# Patient Record
Sex: FEMALE | Race: AMERICAN INDIAN OR ALASKA NATIVE | URBAN - METROPOLITAN AREA
[De-identification: names, ages, dates, MRNs, and addresses within clinical notes are randomized per-mention and may not be internally consistent; named-entity substitution may affect disease eponyms.]

---

## 2021-03-13 ENCOUNTER — INPATIENT (INPATIENT)
Facility: HOSPITAL | Age: 71
LOS: 5 days | Discharge: NURSING, REHAB OR LONG-TERM CARE | End: 2021-03-19
Attending: STUDENT IN AN ORGANIZED HEALTH CARE EDUCATION/TRAINING PROGRAM | Admitting: STUDENT IN AN ORGANIZED HEALTH CARE EDUCATION/TRAINING PROGRAM
Payer: COMMERCIAL

## 2021-03-13 VITALS
RESPIRATION RATE: 18 BRPM | HEART RATE: 95 BPM | DIASTOLIC BLOOD PRESSURE: 104 MMHG | TEMPERATURE: 98 F | SYSTOLIC BLOOD PRESSURE: 180 MMHG | OXYGEN SATURATION: 100 %

## 2021-03-13 LAB
APPEARANCE UR: CLEAR — SIGNIFICANT CHANGE UP
BILIRUB UR-MCNC: NEGATIVE — SIGNIFICANT CHANGE UP
BLOOD GAS VENOUS COMPREHENSIVE RESULT: SIGNIFICANT CHANGE UP
CHLORIDE SERPL-SCNC: 99 MMOL/L — SIGNIFICANT CHANGE UP (ref 98–107)
COLOR SPEC: SIGNIFICANT CHANGE UP
DIFF PNL FLD: ABNORMAL
GLUCOSE UR QL: ABNORMAL
HCT VFR BLD CALC: 37.8 % — SIGNIFICANT CHANGE UP (ref 34.5–45)
HGB BLD-MCNC: 12.2 G/DL — SIGNIFICANT CHANGE UP (ref 11.5–15.5)
KETONES UR-MCNC: NEGATIVE — SIGNIFICANT CHANGE UP
LEUKOCYTE ESTERASE UR-ACNC: NEGATIVE — SIGNIFICANT CHANGE UP
MCHC RBC-ENTMCNC: 26.8 PG — LOW (ref 27–34)
MCHC RBC-ENTMCNC: 32.3 GM/DL — SIGNIFICANT CHANGE UP (ref 32–36)
MCV RBC AUTO: 82.9 FL — SIGNIFICANT CHANGE UP (ref 80–100)
NITRITE UR-MCNC: NEGATIVE — SIGNIFICANT CHANGE UP
NRBC # BLD: 0 /100 WBCS — SIGNIFICANT CHANGE UP
NRBC # FLD: 0 K/UL — SIGNIFICANT CHANGE UP
PH UR: 6.5 — SIGNIFICANT CHANGE UP (ref 5–8)
PLATELET # BLD AUTO: 461 K/UL — HIGH (ref 150–400)
POTASSIUM SERPL-MCNC: 4.5 MMOL/L — SIGNIFICANT CHANGE UP (ref 3.5–5.3)
POTASSIUM SERPL-SCNC: 4.5 MMOL/L — SIGNIFICANT CHANGE UP (ref 3.5–5.3)
PROT UR-MCNC: ABNORMAL
RBC # BLD: 4.56 M/UL — SIGNIFICANT CHANGE UP (ref 3.8–5.2)
RBC # FLD: 12.5 % — SIGNIFICANT CHANGE UP (ref 10.3–14.5)
SODIUM SERPL-SCNC: 134 MMOL/L — LOW (ref 135–145)
SP GR SPEC: 1.02 — SIGNIFICANT CHANGE UP (ref 1.01–1.02)
TROPONIN T, HIGH SENSITIVITY RESULT: 44 NG/L — SIGNIFICANT CHANGE UP
TSH SERPL-MCNC: 2.54 UIU/ML — SIGNIFICANT CHANGE UP (ref 0.27–4.2)
UROBILINOGEN FLD QL: SIGNIFICANT CHANGE UP
WBC # BLD: 13.59 K/UL — HIGH (ref 3.8–10.5)
WBC # FLD AUTO: 13.59 K/UL — HIGH (ref 3.8–10.5)

## 2021-03-13 PROCEDURE — 93010 ELECTROCARDIOGRAM REPORT: CPT | Mod: NC

## 2021-03-13 PROCEDURE — 71045 X-RAY EXAM CHEST 1 VIEW: CPT | Mod: 26

## 2021-03-13 PROCEDURE — 70450 CT HEAD/BRAIN W/O DYE: CPT | Mod: 26

## 2021-03-13 PROCEDURE — 99285 EMERGENCY DEPT VISIT HI MDM: CPT | Mod: 25

## 2021-03-13 NOTE — ED ADULT TRIAGE NOTE - CHIEF COMPLAINT QUOTE
weakness, dizziness since this AM with fall onto hands when getting out of chair, no pain at present -- HX diabetes, 3 cardiac stents - no chest pain, no SOB -  in triage

## 2021-03-13 NOTE — ED ADULT NURSE NOTE - NSIMPLEMENTINTERV_GEN_ALL_ED
Implemented All Universal Safety Interventions:  [place] to call system. Call bell, personal items and telephone within reach. Instruct patient to call for assistance. Room bathroom lighting operational. Non-slip footwear when patient is off stretcher. Physically safe environment: no spills, clutter or unnecessary equipment. Stretcher in lowest position, wheels locked, appropriate side rails in place.

## 2021-03-13 NOTE — ED PROVIDER NOTE - PHYSICAL EXAMINATION
Well appearing, well nourished, awake, alert, oriented to person, place, time/situation and in no apparent distress.    Airway patent    Eyes without scleral injection. No jaundice.    Strong pulse.    Respirations unlabored.    Abdomen soft, non-tender, no guarding.    Spine appears normal, range of motion is not limited, no muscle or joint tenderness.    Alert and oriented, no gross motor or sensory deficits.    Skin normal color for race, warm, dry and intact. No evidence of rash. Cold skin.    No SI/HI.

## 2021-03-13 NOTE — ED PROVIDER NOTE - PROGRESS NOTE DETAILS
PGY3/MD Marissa. spoke with the son, pt was last seen normal was 10P friday (30 hrs ago), found to be slurry speache at 8a saturday (20 hrs ago). on my exam, pt has right side facial droop, left upper arm pronator drip, called neuro. ischemia work up. aspirin started. given low GFR, likely MRI and MRA. out side tPA or intervention window. PGY3/MD Marissa. neuro agrees with ischaemia work up, including MRI, risk out weight benefit to receive contras cta given pt is out side of the therapeutic window, MRA tomorrwo.

## 2021-03-13 NOTE — ED PROVIDER NOTE - ATTENDING CONTRIBUTION TO CARE
I performed a face-to-face evaluation of the patient and performed a history and physical examination. I agree with the history and physical examination.    Namita: CAD, DM p/w dizziness and general weakness. Feels cold. Concern for hypothyroidism, ACS, early sepsis. Check labs, EKG, trop, UA, CXR, CT brain.

## 2021-03-13 NOTE — ED PROVIDER NOTE - CARE PLAN
Principal Discharge DX:	Dizziness  Secondary Diagnosis:	CAD (coronary artery disease)  Secondary Diagnosis:	DM (diabetes mellitus)

## 2021-03-13 NOTE — ED ADULT NURSE NOTE - OBJECTIVE STATEMENT
patient complaining of generalized weakness to entire body starting this AM. eating and drinking well, no n/v. no complaints of pain or discomfort. able to move all extremities. ALOCx4 skin intact patient complaining of generalized weakness to entire body starting this AM upon waking. eating and drinking well, no n/v. no complaints of pain or discomfort. able to move all extremities. left facial droop noted.  ALOCx4 skin intact patient complaining of generalized weakness to entire body starting this AM. eating and drinking well, no n/v. no complaints of pain or discomfort. able to move all extremities. left facial droop noted.  ALOCx4 skin intact patient complaining of generalized weakness to entire body starting this AM. eating and drinking well, no n/v. no complaints of pain or discomfort. able to move all extremities. left facial droop noted. weakness noted to left arm and left leg ALOCx4 skin intact patient complaining of generalized weakness to entire body starting this AM. eating and drinking well, no n/v. no complaints of pain or discomfort. able to move all extremities. left facial droop noted. weakness noted to left arm and left leg ALOCx4 skin intact, eccymosis noted to left elbow

## 2021-03-13 NOTE — ED PROVIDER NOTE - CLINICAL SUMMARY MEDICAL DECISION MAKING FREE TEXT BOX
Namita: CAD, DM p/w dizziness and general weakness. Feels cold. Concern for hypothyroidism, ACS, early sepsis. Check labs, EKG, trop, UA, CXR, CT brain.

## 2021-03-13 NOTE — ED PROVIDER NOTE - SEVERE SEPSIS ALERT DETAILS
pt p/w chills, neg SIRS criteria. possible bacterial infection, empirically covered. clinical uncertainity for for sepsis vs infection of unknown origin vs weakness from hyperglycemia with no acidosis.

## 2021-03-14 DIAGNOSIS — R42 DIZZINESS AND GIDDINESS: ICD-10-CM

## 2021-03-14 DIAGNOSIS — I10 ESSENTIAL (PRIMARY) HYPERTENSION: ICD-10-CM

## 2021-03-14 DIAGNOSIS — E11.65 TYPE 2 DIABETES MELLITUS WITH HYPERGLYCEMIA: ICD-10-CM

## 2021-03-14 DIAGNOSIS — N17.9 ACUTE KIDNEY FAILURE, UNSPECIFIED: ICD-10-CM

## 2021-03-14 DIAGNOSIS — R53.1 WEAKNESS: ICD-10-CM

## 2021-03-14 DIAGNOSIS — I25.10 ATHEROSCLEROTIC HEART DISEASE OF NATIVE CORONARY ARTERY WITHOUT ANGINA PECTORIS: ICD-10-CM

## 2021-03-14 DIAGNOSIS — Z29.9 ENCOUNTER FOR PROPHYLACTIC MEASURES, UNSPECIFIED: ICD-10-CM

## 2021-03-14 DIAGNOSIS — D72.829 ELEVATED WHITE BLOOD CELL COUNT, UNSPECIFIED: ICD-10-CM

## 2021-03-14 LAB
A1C WITH ESTIMATED AVERAGE GLUCOSE RESULT: 14 % — HIGH (ref 4–5.6)
ALBUMIN SERPL ELPH-MCNC: 3.3 G/DL — SIGNIFICANT CHANGE UP (ref 3.3–5)
ALP SERPL-CCNC: 76 U/L — SIGNIFICANT CHANGE UP (ref 40–120)
ALT FLD-CCNC: 16 U/L — SIGNIFICANT CHANGE UP (ref 4–33)
ANION GAP SERPL CALC-SCNC: 10 MMOL/L — SIGNIFICANT CHANGE UP (ref 7–14)
ANION GAP SERPL CALC-SCNC: 11 MMOL/L — SIGNIFICANT CHANGE UP (ref 7–14)
AST SERPL-CCNC: 17 U/L — SIGNIFICANT CHANGE UP (ref 4–32)
BASE EXCESS BLDV CALC-SCNC: -0.9 MMOL/L — SIGNIFICANT CHANGE UP (ref -3–2)
BILIRUB SERPL-MCNC: <0.2 MG/DL — SIGNIFICANT CHANGE UP (ref 0.2–1.2)
BLOOD GAS VENOUS - CREATININE: 1.8 MG/DL — HIGH (ref 0.5–1.3)
BLOOD GAS VENOUS COMPREHENSIVE RESULT: SIGNIFICANT CHANGE UP
BUN SERPL-MCNC: 25 MG/DL — HIGH (ref 7–23)
BUN SERPL-MCNC: 32 MG/DL — HIGH (ref 7–23)
CALCIUM SERPL-MCNC: 8.3 MG/DL — LOW (ref 8.4–10.5)
CALCIUM SERPL-MCNC: 9.3 MG/DL — SIGNIFICANT CHANGE UP (ref 8.4–10.5)
CHLORIDE BLDV-SCNC: 115 MMOL/L — HIGH (ref 96–108)
CHLORIDE SERPL-SCNC: 107 MMOL/L — SIGNIFICANT CHANGE UP (ref 98–107)
CHOLEST SERPL-MCNC: 263 MG/DL — HIGH
CO2 SERPL-SCNC: 21 MMOL/L — LOW (ref 22–31)
CO2 SERPL-SCNC: 25 MMOL/L — SIGNIFICANT CHANGE UP (ref 22–31)
CREAT SERPL-MCNC: 1.76 MG/DL — HIGH (ref 0.5–1.3)
CREAT SERPL-MCNC: 2.1 MG/DL — HIGH (ref 0.5–1.3)
ESTIMATED AVERAGE GLUCOSE: 355 MG/DL — HIGH (ref 68–114)
GAS PNL BLDV: 135 MMOL/L — LOW (ref 136–146)
GLUCOSE BLDV-MCNC: 216 MG/DL — HIGH (ref 70–99)
GLUCOSE SERPL-MCNC: 225 MG/DL — HIGH (ref 70–99)
GLUCOSE SERPL-MCNC: 468 MG/DL — CRITICAL HIGH (ref 70–99)
HCO3 BLDV-SCNC: 23 MMOL/L — SIGNIFICANT CHANGE UP (ref 20–27)
HCT VFR BLD CALC: 32.9 % — LOW (ref 34.5–45)
HCT VFR BLDA CALC: 36 % — SIGNIFICANT CHANGE UP (ref 34.5–46.5)
HDLC SERPL-MCNC: 34 MG/DL — LOW
HGB BLD CALC-MCNC: 11.7 G/DL — SIGNIFICANT CHANGE UP (ref 11.5–15.5)
HGB BLD-MCNC: 10.7 G/DL — LOW (ref 11.5–15.5)
INR BLD: 0.95 RATIO — SIGNIFICANT CHANGE UP (ref 0.88–1.16)
LACTATE BLDV-MCNC: 1.4 MMOL/L — SIGNIFICANT CHANGE UP (ref 0.5–2)
LIPID PNL WITH DIRECT LDL SERPL: 150 MG/DL — HIGH
MAGNESIUM SERPL-MCNC: 1.9 MG/DL — SIGNIFICANT CHANGE UP (ref 1.6–2.6)
MCHC RBC-ENTMCNC: 27.2 PG — SIGNIFICANT CHANGE UP (ref 27–34)
MCHC RBC-ENTMCNC: 32.5 GM/DL — SIGNIFICANT CHANGE UP (ref 32–36)
MCV RBC AUTO: 83.7 FL — SIGNIFICANT CHANGE UP (ref 80–100)
NON HDL CHOLESTEROL: 229 MG/DL — HIGH
NRBC # BLD: 0 /100 WBCS — SIGNIFICANT CHANGE UP
NRBC # FLD: 0 K/UL — SIGNIFICANT CHANGE UP
PCO2 BLDV: 46 MMHG — SIGNIFICANT CHANGE UP (ref 41–51)
PH BLDV: 7.34 — SIGNIFICANT CHANGE UP (ref 7.32–7.43)
PHOSPHATE SERPL-MCNC: 3.3 MG/DL — SIGNIFICANT CHANGE UP (ref 2.5–4.5)
PLATELET # BLD AUTO: 382 K/UL — SIGNIFICANT CHANGE UP (ref 150–400)
PO2 BLDV: 36 MMHG — SIGNIFICANT CHANGE UP (ref 35–40)
POTASSIUM BLDV-SCNC: 3 MMOL/L — LOW (ref 3.4–4.5)
POTASSIUM SERPL-MCNC: 3.2 MMOL/L — LOW (ref 3.5–5.3)
POTASSIUM SERPL-SCNC: 3.2 MMOL/L — LOW (ref 3.5–5.3)
PROT SERPL-MCNC: 6.5 G/DL — SIGNIFICANT CHANGE UP (ref 6–8.3)
PROTHROM AB SERPL-ACNC: 10.9 SEC — SIGNIFICANT CHANGE UP (ref 10.6–13.6)
RBC # BLD: 3.93 M/UL — SIGNIFICANT CHANGE UP (ref 3.8–5.2)
RBC # FLD: 12.7 % — SIGNIFICANT CHANGE UP (ref 10.3–14.5)
SAO2 % BLDV: 67.1 % — SIGNIFICANT CHANGE UP (ref 60–85)
SARS-COV-2 IGG SERPL QL IA: NEGATIVE — SIGNIFICANT CHANGE UP
SARS-COV-2 IGM SERPL IA-ACNC: 0.06 INDEX — SIGNIFICANT CHANGE UP
SARS-COV-2 RNA SPEC QL NAA+PROBE: SIGNIFICANT CHANGE UP
SODIUM SERPL-SCNC: 139 MMOL/L — SIGNIFICANT CHANGE UP (ref 135–145)
TRIGL SERPL-MCNC: 396 MG/DL — HIGH
TROPONIN T, HIGH SENSITIVITY RESULT: 43 NG/L — SIGNIFICANT CHANGE UP
WBC # BLD: 11.71 K/UL — HIGH (ref 3.8–10.5)
WBC # FLD AUTO: 11.71 K/UL — HIGH (ref 3.8–10.5)

## 2021-03-14 PROCEDURE — 70551 MRI BRAIN STEM W/O DYE: CPT | Mod: 26

## 2021-03-14 PROCEDURE — 70547 MR ANGIOGRAPHY NECK W/O DYE: CPT | Mod: 26

## 2021-03-14 PROCEDURE — 99223 1ST HOSP IP/OBS HIGH 75: CPT | Mod: GC

## 2021-03-14 PROCEDURE — 12345: CPT | Mod: NC

## 2021-03-14 PROCEDURE — 99223 1ST HOSP IP/OBS HIGH 75: CPT

## 2021-03-14 RX ORDER — SODIUM CHLORIDE 9 MG/ML
1000 INJECTION, SOLUTION INTRAVENOUS
Refills: 0 | Status: DISCONTINUED | OUTPATIENT
Start: 2021-03-14 | End: 2021-03-19

## 2021-03-14 RX ORDER — ASPIRIN/CALCIUM CARB/MAGNESIUM 324 MG
81 TABLET ORAL ONCE
Refills: 0 | Status: COMPLETED | OUTPATIENT
Start: 2021-03-14 | End: 2021-03-14

## 2021-03-14 RX ORDER — SODIUM CHLORIDE 9 MG/ML
1000 INJECTION INTRAMUSCULAR; INTRAVENOUS; SUBCUTANEOUS ONCE
Refills: 0 | Status: COMPLETED | OUTPATIENT
Start: 2021-03-14 | End: 2021-03-14

## 2021-03-14 RX ORDER — DEXTROSE 50 % IN WATER 50 %
15 SYRINGE (ML) INTRAVENOUS ONCE
Refills: 0 | Status: DISCONTINUED | OUTPATIENT
Start: 2021-03-14 | End: 2021-03-19

## 2021-03-14 RX ORDER — FENOFIBRATE,MICRONIZED 130 MG
48 CAPSULE ORAL DAILY
Refills: 0 | Status: DISCONTINUED | OUTPATIENT
Start: 2021-03-14 | End: 2021-03-14

## 2021-03-14 RX ORDER — HEPARIN SODIUM 5000 [USP'U]/ML
5000 INJECTION INTRAVENOUS; SUBCUTANEOUS EVERY 12 HOURS
Refills: 0 | Status: DISCONTINUED | OUTPATIENT
Start: 2021-03-14 | End: 2021-03-19

## 2021-03-14 RX ORDER — DEXTROSE 50 % IN WATER 50 %
12.5 SYRINGE (ML) INTRAVENOUS ONCE
Refills: 0 | Status: DISCONTINUED | OUTPATIENT
Start: 2021-03-14 | End: 2021-03-19

## 2021-03-14 RX ORDER — INSULIN LISPRO 100/ML
VIAL (ML) SUBCUTANEOUS EVERY 6 HOURS
Refills: 0 | Status: DISCONTINUED | OUTPATIENT
Start: 2021-03-14 | End: 2021-03-14

## 2021-03-14 RX ORDER — DEXTROSE 50 % IN WATER 50 %
25 SYRINGE (ML) INTRAVENOUS ONCE
Refills: 0 | Status: DISCONTINUED | OUTPATIENT
Start: 2021-03-14 | End: 2021-03-19

## 2021-03-14 RX ORDER — CEFTRIAXONE 500 MG/1
1000 INJECTION, POWDER, FOR SOLUTION INTRAMUSCULAR; INTRAVENOUS ONCE
Refills: 0 | Status: COMPLETED | OUTPATIENT
Start: 2021-03-14 | End: 2021-03-14

## 2021-03-14 RX ORDER — ATORVASTATIN CALCIUM 80 MG/1
80 TABLET, FILM COATED ORAL AT BEDTIME
Refills: 0 | Status: DISCONTINUED | OUTPATIENT
Start: 2021-03-14 | End: 2021-03-19

## 2021-03-14 RX ORDER — INSULIN HUMAN 100 [IU]/ML
6 INJECTION, SOLUTION SUBCUTANEOUS ONCE
Refills: 0 | Status: COMPLETED | OUTPATIENT
Start: 2021-03-14 | End: 2021-03-14

## 2021-03-14 RX ORDER — INSULIN LISPRO 100/ML
VIAL (ML) SUBCUTANEOUS
Refills: 0 | Status: DISCONTINUED | OUTPATIENT
Start: 2021-03-14 | End: 2021-03-19

## 2021-03-14 RX ORDER — INSULIN LISPRO 100/ML
VIAL (ML) SUBCUTANEOUS AT BEDTIME
Refills: 0 | Status: DISCONTINUED | OUTPATIENT
Start: 2021-03-14 | End: 2021-03-19

## 2021-03-14 RX ORDER — GLUCAGON INJECTION, SOLUTION 0.5 MG/.1ML
1 INJECTION, SOLUTION SUBCUTANEOUS ONCE
Refills: 0 | Status: DISCONTINUED | OUTPATIENT
Start: 2021-03-14 | End: 2021-03-19

## 2021-03-14 RX ORDER — ASPIRIN/CALCIUM CARB/MAGNESIUM 324 MG
81 TABLET ORAL DAILY
Refills: 0 | Status: DISCONTINUED | OUTPATIENT
Start: 2021-03-14 | End: 2021-03-19

## 2021-03-14 RX ORDER — POTASSIUM CHLORIDE 20 MEQ
40 PACKET (EA) ORAL EVERY 4 HOURS
Refills: 0 | Status: COMPLETED | OUTPATIENT
Start: 2021-03-14 | End: 2021-03-14

## 2021-03-14 RX ADMIN — Medication 81 MILLIGRAM(S): at 10:21

## 2021-03-14 RX ADMIN — Medication 81 MILLIGRAM(S): at 04:49

## 2021-03-14 RX ADMIN — INSULIN HUMAN 6 UNIT(S): 100 INJECTION, SOLUTION SUBCUTANEOUS at 00:25

## 2021-03-14 RX ADMIN — Medication 2: at 06:19

## 2021-03-14 RX ADMIN — CEFTRIAXONE 100 MILLIGRAM(S): 500 INJECTION, POWDER, FOR SOLUTION INTRAMUSCULAR; INTRAVENOUS at 00:36

## 2021-03-14 RX ADMIN — HEPARIN SODIUM 5000 UNIT(S): 5000 INJECTION INTRAVENOUS; SUBCUTANEOUS at 19:00

## 2021-03-14 RX ADMIN — ATORVASTATIN CALCIUM 80 MILLIGRAM(S): 80 TABLET, FILM COATED ORAL at 21:24

## 2021-03-14 RX ADMIN — SODIUM CHLORIDE 1000 MILLILITER(S): 9 INJECTION INTRAMUSCULAR; INTRAVENOUS; SUBCUTANEOUS at 03:08

## 2021-03-14 RX ADMIN — SODIUM CHLORIDE 1000 MILLILITER(S): 9 INJECTION INTRAMUSCULAR; INTRAVENOUS; SUBCUTANEOUS at 00:25

## 2021-03-14 RX ADMIN — CEFTRIAXONE 1000 MILLIGRAM(S): 500 INJECTION, POWDER, FOR SOLUTION INTRAMUSCULAR; INTRAVENOUS at 03:15

## 2021-03-14 RX ADMIN — Medication 40 MILLIEQUIVALENT(S): at 14:46

## 2021-03-14 RX ADMIN — Medication 40 MILLIEQUIVALENT(S): at 10:21

## 2021-03-14 RX ADMIN — SODIUM CHLORIDE 1000 MILLILITER(S): 9 INJECTION INTRAMUSCULAR; INTRAVENOUS; SUBCUTANEOUS at 03:15

## 2021-03-14 RX ADMIN — Medication 2: at 13:19

## 2021-03-14 RX ADMIN — HEPARIN SODIUM 5000 UNIT(S): 5000 INJECTION INTRAVENOUS; SUBCUTANEOUS at 06:19

## 2021-03-14 NOTE — H&P ADULT - ASSESSMENT
71 y/o woman with pmhx of CAD s/p stent 6 years ago, Dm2, ?CKD, HTN who presents to the ER with 1-2 days of generalized weakness with fall at home and slurred speech. Found in the ER to have +L side facial droop concerning for stroke.

## 2021-03-14 NOTE — H&P ADULT - NSHPREVIEWOFSYSTEMS_GEN_ALL_CORE
- Continue ASA & atorvastatin  - Eventually will resume ISDN and Toprol XL REVIEW OF SYSTEMS:    CONSTITUTIONAL: +generalized weakness, +chills, no fevers  EYES/ENT: No visual changes;  No dysphagia  NECK: No pain or stiffness  RESPIRATORY: No cough, wheezing, hemoptysis; No shortness of breath  CARDIOVASCULAR: No chest pain or palpitations; No lower extremity edema  GASTROINTESTINAL: No abdominal or epigastric pain. No nausea, vomiting, or hematemesis; No diarrhea or constipation. No melena or hematochezia.  GENITOURINARY: No dysuria, frequency or hematuria  NEUROLOGICAL: +nonspecific weakness. No numbness  SKIN: No itching, burning, rashes, or lesions

## 2021-03-14 NOTE — H&P ADULT - PROBLEM SELECTOR PLAN 4
unclear, possibly reactive. Pt received ceftriaxone in the ER empirically. UA with no leuks nor nitrites. CXR appears clear. Pt otherwise afebrile  -no clear sign of infection, hold off further antibiotics for now.

## 2021-03-14 NOTE — H&P ADULT - NSHPPHYSICALEXAM_GEN_ALL_CORE
PHYSICAL EXAM:  GENERAL: NAD, well-developed, well-nourished  HEAD:  Atraumatic, Normocephalic  EYES: EOMI, PERRLA, conjunctiva and sclera clear  NECK: Supple, No JVD  CHEST/LUNG: Clear to auscultation bilaterally; No wheezes, rales or rhonchi  HEART: Regular rate and rhythm; No murmurs, rubs, or gallops, (+)S1, S2  ABDOMEN: Soft, Nontender, Nondistended; Normal Bowel sounds   EXTREMITIES:  no LE edema b/l  PSYCH: normal mood and affect  NEUROLOGY: AAOx3, +L facial droop. strength 5/5 b/l UE and LE. sensation intact to light touch b/l  SKIN: No rashes or lesions

## 2021-03-14 NOTE — H&P ADULT - NSICDXPASTMEDICALHX_GEN_ALL_CORE_FT
PAST MEDICAL HISTORY:  CAD (coronary artery disease)     DM (diabetes mellitus)     Hypertension

## 2021-03-14 NOTE — PROGRESS NOTE ADULT - ASSESSMENT
71 y/o woman with pmhx of CAD s/p stent 6 years ago, Dm2, ?CKD, HTN who presents to the ER with 1-2 days of generalized weakness with fall at home and slurred speech admitted for stroke.

## 2021-03-14 NOTE — CHART NOTE - NSCHARTNOTEFT_GEN_A_CORE
Spoke w/ patient's son, Eloisa Castro @ [phone], and updated to current care plan. All other medical questions were answered and teach-back offered with demonstrated understanding.    Maynor Cooper PA-C  Medicine ACP, pgr [phone].

## 2021-03-14 NOTE — H&P ADULT - PROBLEM SELECTOR PLAN 1
Pt reporting nonspecific generalized weakness, but was slurring words to son during the day and currently with L side facial droop and per neuro with drift on L side. concern for ischemic event. TSH unremarkable and no sign of infection obvious  -CTH with no hemorrhage. CTA unable to be done due to kidney injury  -Neurology consulted, will obtain MR head noncon and MRA H&N noncon  -continue asa, statin and obtain echo. monitor on tele  -per neuro will do permissive htn for 24 hrs  -physical therapy/speech eval.

## 2021-03-14 NOTE — H&P ADULT - HISTORY OF PRESENT ILLNESS
Pt is a 71 y/o woman with pmhx of CAD s/p stent 6 years ago, Dm2, ?CKD, HTN who presents to the ER with 1-2 days of generalized weakness. Pt reports she woke up this AM very weak all over but could still walk. No new changes in meds. She denied any fevers but had chills. no cough, chest pain, palpitations, shortness of breath, abd pain, dysuria or diarrhea. Reportedly per her son she had fallen and was slightly confused but with no LOC or head strike. She was found to have slurred speech at some point as well. She denies any headache or vision changes. Last normal per son around 10 PM friday. in the ER pt was given asa, ceftriaxone and 2L NS.

## 2021-03-14 NOTE — H&P ADULT - PROBLEM SELECTOR PLAN 5
no chest pain reported, trops flat. EKG appears sinus rhythm.   -check echo as above, monitor on telemetry  -confirm home meds this AM

## 2021-03-14 NOTE — CHART NOTE - NSCHARTNOTEFT_GEN_A_CORE
Endocrinology consulted for DM management. Consult appreciated.    Maynor Cooper PA-C  Medicine ACP, pgr [phone]

## 2021-03-14 NOTE — CHART NOTE - NSCHARTNOTEFT_GEN_A_CORE
Endocrine team unable to assess patient today.  Endocrine consult requested for management of newly diagnosed DM. A1c 14%.   Recommend Lantus 8units qhs  Recommend low correctional scale premeal and qhs   FS goal 100-180    offical consult to follow tmw    Hari Hernandez MD  Endocrine Fellow   Pager  on weekdays 9am- 5pm   Please call  after hours and on weekends

## 2021-03-14 NOTE — CONSULT NOTE ADULT - SUBJECTIVE AND OBJECTIVE BOX
Reason For Consult: T2DM    HPI: Pt is a 71 y/o woman with pmhx of CAD s/p stent 6 years ago, Dm2, ?CKD, HTN who presents to the ER with 1-2 days of generalized weakness. Pt reports she woke up this AM very weak all over but could still walk. No new changes in meds. She denied any fevers but had chills. no cough, chest pain, palpitations, shortness of breath, abd pain, dysuria or diarrhea. Reportedly per her son she had fallen and was slightly confused but with no LOC or head strike. She was found to have slurred speech at some point as well. She denies any headache or vision changes. Last normal per son around 10 PM friday. in the ER pt was given asa, ceftriaxone and 2L NS.   (14 Mar 2021 05:43)    Endocrine History:      PAST MEDICAL & SURGICAL HISTORY:  Hypertension  DM (diabetes mellitus)  CAD (coronary artery disease)    FAMILY HISTORY:  No pertinent family history in first degree relatives    Social History:    Outpatient Medications:    MEDICATIONS  (STANDING):  aspirin  chewable 81 milliGRAM(s) Oral daily  atorvastatin 80 milliGRAM(s) Oral at bedtime  dextrose 40% Gel 15 Gram(s) Oral once  dextrose 5%. 1000 milliLiter(s) (50 mL/Hr) IV Continuous <Continuous>  dextrose 5%. 1000 milliLiter(s) (100 mL/Hr) IV Continuous <Continuous>  dextrose 50% Injectable 25 Gram(s) IV Push once  dextrose 50% Injectable 12.5 Gram(s) IV Push once  dextrose 50% Injectable 25 Gram(s) IV Push once  fenofibrate Tablet 48 milliGRAM(s) Oral daily  glucagon  Injectable 1 milliGRAM(s) IntraMuscular once  heparin   Injectable 5000 Unit(s) SubCutaneous every 12 hours  insulin lispro (ADMELOG) corrective regimen sliding scale LOW  SubCutaneous three times a day before meals  insulin lispro (ADMELOG) corrective regimen sliding scale LOW  SubCutaneous at bedtime  Dysphagia 2 mechanical soft, thin liquids    MEDICATIONS  (PRN):      Allergies  No Known Allergies      Review of Systems:  Constitutional: No fever  Eyes: No blurry vision  Neuro: No tremors  HEENT: No pain  Cardiovascular: No chest pain, palpitations  Respiratory: No SOB, no cough  GI: No nausea, vomiting, abdominal pain  : No dysuria  Skin: no rash  Psych: no depression  Endocrine: no polyuria, polydipsia  Hem/lymph: no swelling  Osteoporosis: no fractures    ALL OTHER SYSTEMS REVIEWED AND NEGATIVE    UNABLE TO OBTAIN    PHYSICAL EXAM:  VITALS: T(C): 36.8 (03-14-21 @ 13:05)  T(F): 98.2 (03-14-21 @ 13:05), Max: 98.7 (03-14-21 @ 06:02)  HR: 74 (03-14-21 @ 16:10) (74 - 95)  BP: 189/86 (03-14-21 @ 14:04) (176/82 - 218/108)  RR:  (16 - 18)  SpO2:  (98% - 100%)    GENERAL: NAD, well-groomed, well-developed  EYES: No proptosis, no lid lag, anicteric  HEENT:  Atraumatic, Normocephalic, moist mucous membranes  THYROID: Normal size, no palpable nodules  RESPIRATORY: Clear to auscultation bilaterally; No rales, rhonchi, wheezing, or rubs  CARDIOVASCULAR: Regular rate and rhythm; No murmurs; no peripheral edema  GI: Soft, nontender, non distended, normal bowel sounds  SKIN: Dry, intact, No rashes or lesions  MUSCULOSKELETAL: Full range of motion, normal strength  NEURO: sensation intact, extraocular movements intact, no tremor, normal reflexes  PSYCH: Alert and oriented x 3, normal affect, normal mood  CUSHING'S SIGNS: no striae    POCT Blood Glucose.: 210 mg/dL (03-14-21 @ 12:53) A2  POCT Blood Glucose.: 212 mg/dL (03-14-21 @ 06:15) A2  POCT Blood Glucose.: 202 mg/dL (03-14-21 @ 01:53)    POCT Blood Glucose.: 480 mg/dL (03-13-21 @ 20:49)                            10.7   11.71 )-----------( 382      ( 14 Mar 2021 06:23 )             32.9       03-14    139  |  107  |  25<H>  ----------------------------<  225<H>  3.2<L>   |  21<L>  |  1.76<H>    EGFR if : 34<L>  EGFR if non : 29<L>    Ca    8.3<L>      03-14  Mg     1.9     03-14  Phos  3.3     03-14    TPro  6.5  /  Alb  3.3  /  TBili  <0.2  /  DBili  x   /  AST  17  /  ALT  16  /  AlkPhos  76  03-13      Thyroid Function Tests:    03-13 @ 23:00 TSH 2.54 FreeT4 -- T3 -- Anti TPO -- Anti Thyroglobulin Ab -- TSI --    03-14 Chol 263<H> LDL -- HDL 34<L> Trig 396<H>      A1C with Estimated Average Glucose (03.14.21 @ 06:23)   A1C with Estimated Average Glucose Result: 14.0%

## 2021-03-14 NOTE — CONSULT NOTE ADULT - SUBJECTIVE AND OBJECTIVE BOX
HPI: Collateral obtained from son. 70 year old RH  female, PMH DM, CKD, presenting to the ED for confusion s/p fall. According to the son, patient on Saturday morning 9am fell in the kitchen. She no LOC or head strike. Patient was assisted upwards and taken to bed to rest. She was still able to perform her ADL's throughout the day. Son went to work and came home at 7pm. When the son came home, patient was not able to remember the events of the fall. She was also reported to have questionable slurred speech. Son became anxious and brought the patient to the [facility] ED. The son stated he last saw the patient confirmed to be normal Friday night at approximately 10pm. Patient denies any blurry vision, diplopia, trouble w/ speech/swallowing, focal numbness/weakness/tingling. She is still unable to recall the events of the fall. CTH performed in the ED showed no acute pathologies. CTA was unable to be performed 2/2 Cr function. No prior CVA's reported. Not a candidate for tPA 2/2 outside of time window. Not candidate for endovascular 2/2 out of window.     (Stroke only)  NIHSS: 4   pre-MRS: 0    REVIEW OF SYSTEMS  Per HPI    PAST MEDICAL & SURGICAL HISTORY:  DM (diabetes mellitus)    CAD (coronary artery disease)      FAMILY HISTORY:    SOCIAL HISTORY:   T/E/D:   Occupation:   Lives with:     MEDICATIONS (HOME):  Home Medications:    MEDICATIONS  (STANDING):    MEDICATIONS  (PRN):    ALLERGIES/INTOLERANCES:  Allergies  No Known Allergies    Intolerances    VITALS & EXAMINATION:  Vital Signs Last 24 Hrs  T(C): 36.8 (14 Mar 2021 03:36), Max: 36.8 (13 Mar 2021 20:42)  T(F): 98.2 (14 Mar 2021 03:36), Max: 98.3 (13 Mar 2021 20:42)  HR: 82 (14 Mar 2021 03:36) (82 - 95)  BP: 180/62 (14 Mar 2021 03:36) (176/82 - 180/104)  BP(mean): --  RR: 16 (14 Mar 2021 03:36) (16 - 18)  SpO2: 98% (14 Mar 2021 03:36) (98% - 100%)    General:  Constitutional: Female, appears stated age, in no apparent distress including pain  Head: Normocephalic & atraumatic.    Neurological (>12):  MS: Awake, alert, oriented to person, place, situation, time. Normal affect. Follows all commands.    Language: Speech is clear, fluent with good repetition & comprehension    CNs: PERRLA (R = 3mm, L = 3mm). No BT on L. EOMI no nystagmus, no diplopia. Slight R gaze deviation was seen, able to be overcome. V1-3 intact to LT, well developed masseter muscles b/l. Mild to moderate L facial droop, full eye closure strength b/l. Hearing grossly normal (rubbing fingers) b/l. Symmetric palate elevation in midline. Gag reflex deferred. Head turning & shoulder shrug intact b/l. Tongue midline, normal movements, no atrophy.    Motor: Normal muscle bulk & tone. No noticeable tremor or seizure. Drift in the LUE and LLE. No drift in the RLE or RUE.     Sensation: Intact to LT  b/l throughout.     Cortical: Extinction on DSS (neglect): none    Reflexes:              Patellar(L4)    Achilles(S1)    Plantar Resp  R          1		    1		Mute   L          1		    1		Down     Coordination: No dysmetria to FTN    Gait: Mild postural instability.      LABORATORY:  CBC                       12.2   13.59 )-----------( 461      ( 13 Mar 2021 22:57 )             37.8     Chem 03-13    134<L>  |  99  |  32<H>  ----------------------------<  468<HH>  4.5   |  25  |  2.10<H>    Ca    9.3      13 Mar 2021 23:00    TPro  6.5  /  Alb  3.3  /  TBili  <0.2  /  DBili  x   /  AST  17  /  ALT  16  /  AlkPhos  76  03-13    LFTs LIVER FUNCTIONS - ( 13 Mar 2021 23:00 )  Alb: 3.3 g/dL / Pro: 6.5 g/dL / ALK PHOS: 76 U/L / ALT: 16 U/L / AST: 17 U/L / GGT: x           Coagulopathy   Lipid Panel   A1c   Cardiac enzymes     U/A Urinalysis Basic - ( 13 Mar 2021 23:34 )    Color: Light Yellow / Appearance: Clear / S.023 / pH: x  Gluc: x / Ketone: Negative  / Bili: Negative / Urobili: <2 mg/dL   Blood: x / Protein: 300 mg/dL / Nitrite: Negative   Leuk Esterase: Negative / RBC: 2 /HPF / WBC 2-4 /HPF   Sq Epi: x / Non Sq Epi: 2 /HPF / Bacteria: Negative      CSF  Immunological  Other    STUDIES & IMAGING:  Studies (EKG, EEG, EMG, etc):     Radiology (XR, CT, MR, U/S, TTE/SARA):  CT head: No acute intracranial bleeding, mass effect, or shift.

## 2021-03-14 NOTE — H&P ADULT - PROBLEM SELECTOR PLAN 2
no hx of kidney disease. Pt s/p 2L NS, will repeat bmp this AM to see if any improvement. If no improvement or worse will get urine studies and renal ultrasound. Monitor intake and output.

## 2021-03-14 NOTE — H&P ADULT - NSHPLABSRESULTS_GEN_ALL_CORE
134<L>  |  99  |  32<H>  ----------------------------<  468<HH>  4.5   |  25  |  2.10<H>    Ca    9.3      13 Mar 2021 23:00    TPro  6.5  /  Alb  3.3  /  TBili  <0.2  /  DBili  x   /  AST  17  /  ALT  16  /  AlkPhos  76                              12.2   13.59 )-----------( 461      ( 13 Mar 2021 22:57 )             37.8             LIVER FUNCTIONS - ( 13 Mar 2021 23:00 )  Alb: 3.3 g/dL / Pro: 6.5 g/dL / ALK PHOS: 76 U/L / ALT: 16 U/L / AST: 17 U/L / GGT: x                 Urinalysis Basic - ( 13 Mar 2021 23:34 )    Color: Light Yellow / Appearance: Clear / S.023 / pH: x  Gluc: x / Ketone: Negative  / Bili: Negative / Urobili: <2 mg/dL   Blood: x / Protein: 300 mg/dL / Nitrite: Negative   Leuk Esterase: Negative / RBC: 2 /HPF / WBC 2-4 /HPF   Sq Epi: x / Non Sq Epi: 2 /HPF / Bacteria: Negative      CTH: FINDINGS:    There is no acute intracranial mass-effect, hemorrhage, midline shift, or abnormal extra-axial fluid collection. Gray-white differentiation is maintained.    Ventricles, sulci, and cisterns are normal in size for the patient's age without hydrocephalus. Basal cisterns are patent.    Visualized paranasal sinuses and mastoid air cells are clear. Calvarium is intact.    IMPRESSION:    No acute intracranial bleeding, mass effect, or shift.      CXR: INTERPRETATION:  Clear lungs.    Follow up official report in AM.      EKG: normal sinus rhythm

## 2021-03-14 NOTE — H&P ADULT - PROBLEM SELECTOR PLAN 3
Pt presented initially with bmp glucose in the 400s. Not on insulin at home. Pt s/p 2L fluids and insulin with improvement to 200s. No gap and bicarb normal on bmp. lactate elevated however  -repeat bmp this AM and vbg  -continue sliding scale q6h for now given pt npo pending swallow eval  -check a1c

## 2021-03-14 NOTE — CONSULT NOTE ADULT - ASSESSMENT
69 y/o female with severely uncontrolled new onset T2DM (A1c 14%) complicated with CAD s/p stent here with acute CVA. Also with HTN, HLD and hypertriglyceridemia.  (High risk and high decision making)

## 2021-03-14 NOTE — PROGRESS NOTE ADULT - SUBJECTIVE AND OBJECTIVE BOX
Merlin Mathew, MD   Hospitalist  Pager #[phone]    PROGRESS NOTE:     Patient is a 70y old  Female who presents with a chief complaint of generalized weakness, cva (14 Mar 2021 05:43)      SUBJECTIVE / OVERNIGHT EVENTS:  Patient report speech is improved though she still feels well. She has never had MI, stroke in the past, has had sx since yesterday morning.   Denies N/V, dizziness, lightheadedness.   ADDITIONAL REVIEW OF SYSTEMS:    MEDICATIONS  (STANDING):  aspirin  chewable 81 milliGRAM(s) Oral daily  atorvastatin 80 milliGRAM(s) Oral at bedtime  dextrose 40% Gel 15 Gram(s) Oral once  dextrose 5%. 1000 milliLiter(s) (50 mL/Hr) IV Continuous <Continuous>  dextrose 5%. 1000 milliLiter(s) (100 mL/Hr) IV Continuous <Continuous>  dextrose 50% Injectable 25 Gram(s) IV Push once  dextrose 50% Injectable 12.5 Gram(s) IV Push once  dextrose 50% Injectable 25 Gram(s) IV Push once  glucagon  Injectable 1 milliGRAM(s) IntraMuscular once  heparin   Injectable 5000 Unit(s) SubCutaneous every 12 hours  insulin lispro (ADMELOG) corrective regimen sliding scale   SubCutaneous every 6 hours    MEDICATIONS  (PRN):      CAPILLARY BLOOD GLUCOSE      POCT Blood Glucose.: 210 mg/dL (14 Mar 2021 12:53)  POCT Blood Glucose.: 212 mg/dL (14 Mar 2021 06:15)  POCT Blood Glucose.: 202 mg/dL (14 Mar 2021 01:53)  POCT Blood Glucose.: 480 mg/dL (13 Mar 2021 20:49)    I&O's Summary      PHYSICAL EXAM:  Vital Signs Last 24 Hrs  T(C): 36.8 (14 Mar 2021 13:05), Max: 37.1 (14 Mar 2021 06:02)  T(F): 98.2 (14 Mar 2021 13:05), Max: 98.7 (14 Mar 2021 06:02)  HR: 75 (14 Mar 2021 14:04) (75 - 95)  BP: 189/86 (14 Mar 2021 14:04) (176/82 - 218/108)  BP(mean): --  RR: 17 (14 Mar 2021 13:05) (16 - 18)  SpO2: 100% (14 Mar 2021 13:05) (98% - 100%)    CONSTITUTIONAL: NAD, well-developed woman in NAD  RESPIRATORY: Normal respiratory effort; lungs are clear to auscultation bilaterally  CARDIOVASCULAR: Regular rate and rhythm, normal S1 and S2, no murmur/rub/gallop; No lower extremity edema; Peripheral pulses are 2+ bilaterally  ABDOMEN: Nontender to palpation, normoactive bowel sounds, no rebound/guarding; No hepatosplenomegaly  MUSCULOSKELETAL no clubbing or cyanosis of digits; no joint swelling or tenderness to palpation  PSYCH: A+O to person, place, and time; affect appropriate  Neuro: L facial droop, 4/5 strength on L side, gaze deviation to R, dysarthria     LABS:                        10.7   11.71 )-----------( 382      ( 14 Mar 2021 06:23 )             32.9     03-14    139  |  107  |  25<H>  ----------------------------<  225<H>  3.2<L>   |  21<L>  |  1.76<H>    Ca    8.3<L>      14 Mar 2021 06:23  Phos  3.3     03-14  Mg     1.9     03-14    TPro  6.5  /  Alb  3.3  /  TBili  <0.2  /  DBili  x   /  AST  17  /  ALT  16  /  AlkPhos  76  03-13    PT/INR - ( 14 Mar 2021 06:23 )   PT: 10.9 sec;   INR: 0.95 ratio               Urinalysis Basic - ( 13 Mar 2021 23:34 )    Color: Light Yellow / Appearance: Clear / S.023 / pH: x  Gluc: x / Ketone: Negative  / Bili: Negative / Urobili: <2 mg/dL   Blood: x / Protein: 300 mg/dL / Nitrite: Negative   Leuk Esterase: Negative / RBC: 2 /HPF / WBC 2-4 /HPF   Sq Epi: x / Non Sq Epi: 2 /HPF / Bacteria: Negative          RADIOLOGY & ADDITIONAL TESTS:  Results Reviewed:   Imaging Personally Reviewed: MRI:   Abnormal T2 prolongation with restricted diffusion is seen involving the posterior limb of the right internal capsule as well as the right periatrial and right corona radiata region. These findings are compatible areas of acute infarcts. No hemorrhagic transformation is seen. No significant shift or herniation seen  Electrocardiogram Personally Reviewed:    COORDINATION OF CARE:  Care Discussed with Consultants/Other Providers [Y/N]:  Prior or Outpatient Records Reviewed [Y/N]:

## 2021-03-14 NOTE — PATIENT PROFILE ADULT - DEAF OR HARD OF HEARING?
Spoke with Hannah  Patient's domestic partner and confirm Lasix dose of 20 g, and relayed that patient can get labs done on the of the visit with Dr. Guevara, verbalized understanding and had no further questions.   no

## 2021-03-14 NOTE — PATIENT PROFILE ADULT - LEGAL HELP
Home Program: 2017    Kegels in standin. Stand with legs and buttocks relaxed.  2. Squeeze and LIFT the muscles that stop the flow of urine and passage of gas.    3. Hold for 10 sec without holding breath.  4. Release and DROP for 10 sec.  5. Repeat 10 times, 2 sets, 2 times per day.      Do the above while walking and talking sometimes.      Do 2 minutes of belly breathing after each set of Kegels.  (this will lengthen the pelvic floor)      When trying to void, instead of straining, try BELLY BREATHING (breathe in and let your belly expand, breathe out and let it recoil.    
no

## 2021-03-14 NOTE — H&P ADULT - PROBLEM SELECTOR PROBLEM 4
Leukocytosis, unspecified type Burow's Graft Text: The defect edges were debeveled with a #15 scalpel blade.  Given the location of the defect, shape of the defect, the proximity to free margins and the presence of a standing cone deformity a Burow's skin graft was deemed most appropriate. The standing cone was removed and this tissue was then trimmed to the shape of the primary defect. The adipose tissue was also removed until only dermis and epidermis were left.  The skin margins of the secondary defect were undermined to an appropriate distance in all directions utilizing iris scissors.  The secondary defect was closed with interrupted buried subcutaneous sutures.  The skin edges were then re-apposed with running  sutures.  The skin graft was then placed in the primary defect and oriented appropriately.

## 2021-03-14 NOTE — CONSULT NOTE ADULT - ASSESSMENT
Assessment: Collateral obtained from son. 70 year old female, PMH DM, CKD, presenting to the ED for confusion s/p fall. According to the son, patient on Saturday morning 9am fell in the kitchen and later in the evening was not able to recall the events of the fall. Questionable slurred speech was noted by the son when he arrived home work. Exam was notable for mild-moderate L facial droop, with drift in the LLE and LUE. No BTT noted on the left w/ slight R gaze deviation that is able to be overcome. CTH performed in the ED showed no acute pathologies. CTA was unable to be performed 2/2 Cr function. Based on the hx and exam, patient may have suffered a L hemiparesis 2/2 to possible R brain dysfunction. Infarct is of consideration. Metabolic and infectious etiologies are also of consideration. Not a candidate for tPA 2/2 outside of time window. Not candidate for endovascular 2/2 out of window.     (Stroke only)  NIHSS: 4   pre-MRS: 0        Plan:  General  [] permissive HTN for 24 hours up to 220/110  [] gradual normotension after 24 hrs  [] telemetry monitoring  [] MRI head non contrast  [] MRA neck, MRA head    [] STAT repeat CTH if change in neuro status  [] start ASA 81 mg daily  [] start atorva 80 mg daily, titrate based on lipid profile  [] TTE with bubble  [] Lipid panel  [] HbA1C  [] PT/OT  [] Fall precautions  [] dysphagia screen      -Management & disposition to be discussed with neuro attending, Dr. Freitas Assessment: Collateral obtained from son. 70 year old female, PMH DM, CKD, presenting to the ED for confusion s/p fall. According to the son, patient on Saturday morning 9am fell in the kitchen and later in the evening was not able to recall the events of the fall. Questionable slurred speech was noted by the son when he arrived home work. Exam was notable for mild-moderate L facial droop, with drift in the LLE and LUE. No BTT noted on the left w/ slight R gaze deviation that is able to be overcome. CTH performed in the ED showed no acute pathologies. CTA was unable to be performed 2/2 Cr function. Based on the hx and exam, patient may have suffered a L hemiparesis 2/2 to possible R brain dysfunction. Infarct is of consideration. Other etiologies may also be hyperglycemia or hypertensive, since her glucose on admission was 468 and systolics ~170s. Not a candidate for tPA 2/2 outside of time window. Not candidate for endovascular 2/2 out of window.     (Stroke only)  NIHSS: 4   pre-MRS: 0        Plan:  General  [] permissive HTN for 24 hours up to 220/110  [] gradual normotension after 24 hrs  [] telemetry monitoring  [] MRI head non contrast  [] MRA neck, MRA head    [] STAT repeat CTH if change in neuro status  [] start ASA 81 mg daily  [] start atorva 80 mg daily, titrate based on lipid profile  [] TTE with bubble  [] Lipid panel  [] HbA1C  [] PT/OT  [] Fall precautions  [] dysphagia screen      -Management & disposition to be discussed with neuro attending, Dr. Freitas

## 2021-03-14 NOTE — PHYSICAL THERAPY INITIAL EVALUATION ADULT - PERTINENT HX OF CURRENT PROBLEM, REHAB EVAL
patient is a 70 year old female who presents with left sided weakness with left facial droop, admitted for stroke workup. PMH listed below

## 2021-03-15 DIAGNOSIS — I63.9 CEREBRAL INFARCTION, UNSPECIFIED: ICD-10-CM

## 2021-03-15 DIAGNOSIS — E78.5 HYPERLIPIDEMIA, UNSPECIFIED: ICD-10-CM

## 2021-03-15 LAB
A1C WITH ESTIMATED AVERAGE GLUCOSE RESULT: 13.9 % — HIGH (ref 4–5.6)
ANION GAP SERPL CALC-SCNC: 9 MMOL/L — SIGNIFICANT CHANGE UP (ref 7–14)
BUN SERPL-MCNC: 23 MG/DL — SIGNIFICANT CHANGE UP (ref 7–23)
CALCIUM SERPL-MCNC: 9.1 MG/DL — SIGNIFICANT CHANGE UP (ref 8.4–10.5)
CHLORIDE SERPL-SCNC: 104 MMOL/L — SIGNIFICANT CHANGE UP (ref 98–107)
CO2 SERPL-SCNC: 22 MMOL/L — SIGNIFICANT CHANGE UP (ref 22–31)
CREAT SERPL-MCNC: 1.91 MG/DL — HIGH (ref 0.5–1.3)
CULTURE RESULTS: SIGNIFICANT CHANGE UP
ESTIMATED AVERAGE GLUCOSE: 352 MG/DL — HIGH (ref 68–114)
GLUCOSE BLDC GLUCOMTR-MCNC: 234 MG/DL — HIGH (ref 70–99)
GLUCOSE BLDC GLUCOMTR-MCNC: 328 MG/DL — HIGH (ref 70–99)
GLUCOSE SERPL-MCNC: 214 MG/DL — HIGH (ref 70–99)
HCV AB S/CO SERPL IA: 0.16 S/CO — SIGNIFICANT CHANGE UP (ref 0–0.99)
HCV AB SERPL-IMP: SIGNIFICANT CHANGE UP
MAGNESIUM SERPL-MCNC: 2 MG/DL — SIGNIFICANT CHANGE UP (ref 1.6–2.6)
PHOSPHATE SERPL-MCNC: 3.2 MG/DL — SIGNIFICANT CHANGE UP (ref 2.5–4.5)
POTASSIUM SERPL-MCNC: 4.4 MMOL/L — SIGNIFICANT CHANGE UP (ref 3.5–5.3)
POTASSIUM SERPL-SCNC: 4.4 MMOL/L — SIGNIFICANT CHANGE UP (ref 3.5–5.3)
SODIUM SERPL-SCNC: 135 MMOL/L — SIGNIFICANT CHANGE UP (ref 135–145)
SPECIMEN SOURCE: SIGNIFICANT CHANGE UP

## 2021-03-15 PROCEDURE — 99233 SBSQ HOSP IP/OBS HIGH 50: CPT

## 2021-03-15 PROCEDURE — 93306 TTE W/DOPPLER COMPLETE: CPT | Mod: 26

## 2021-03-15 PROCEDURE — 99255 IP/OBS CONSLTJ NEW/EST HI 80: CPT

## 2021-03-15 PROCEDURE — 99232 SBSQ HOSP IP/OBS MODERATE 35: CPT

## 2021-03-15 PROCEDURE — 76770 US EXAM ABDO BACK WALL COMP: CPT | Mod: 26

## 2021-03-15 PROCEDURE — 99222 1ST HOSP IP/OBS MODERATE 55: CPT

## 2021-03-15 RX ORDER — FENOFIBRATE,MICRONIZED 130 MG
48 CAPSULE ORAL DAILY
Refills: 0 | Status: DISCONTINUED | OUTPATIENT
Start: 2021-03-15 | End: 2021-03-19

## 2021-03-15 RX ORDER — INSULIN LISPRO 100/ML
1 VIAL (ML) SUBCUTANEOUS
Refills: 0 | Status: DISCONTINUED | OUTPATIENT
Start: 2021-03-15 | End: 2021-03-16

## 2021-03-15 RX ORDER — INSULIN GLARGINE 100 [IU]/ML
8 INJECTION, SOLUTION SUBCUTANEOUS ONCE
Refills: 0 | Status: COMPLETED | OUTPATIENT
Start: 2021-03-15 | End: 2021-03-15

## 2021-03-15 RX ORDER — INSULIN GLARGINE 100 [IU]/ML
10 INJECTION, SOLUTION SUBCUTANEOUS AT BEDTIME
Refills: 0 | Status: DISCONTINUED | OUTPATIENT
Start: 2021-03-15 | End: 2021-03-16

## 2021-03-15 RX ORDER — NIFEDIPINE 30 MG
30 TABLET, EXTENDED RELEASE 24 HR ORAL DAILY
Refills: 0 | Status: DISCONTINUED | OUTPATIENT
Start: 2021-03-15 | End: 2021-03-16

## 2021-03-15 RX ORDER — INSULIN GLARGINE 100 [IU]/ML
8 INJECTION, SOLUTION SUBCUTANEOUS AT BEDTIME
Refills: 0 | Status: DISCONTINUED | OUTPATIENT
Start: 2021-03-15 | End: 2021-03-15

## 2021-03-15 RX ADMIN — ATORVASTATIN CALCIUM 80 MILLIGRAM(S): 80 TABLET, FILM COATED ORAL at 22:36

## 2021-03-15 RX ADMIN — HEPARIN SODIUM 5000 UNIT(S): 5000 INJECTION INTRAVENOUS; SUBCUTANEOUS at 17:31

## 2021-03-15 RX ADMIN — Medication 2: at 12:36

## 2021-03-15 RX ADMIN — Medication 1 UNIT(S): at 17:31

## 2021-03-15 RX ADMIN — HEPARIN SODIUM 5000 UNIT(S): 5000 INJECTION INTRAVENOUS; SUBCUTANEOUS at 05:29

## 2021-03-15 RX ADMIN — Medication 81 MILLIGRAM(S): at 12:36

## 2021-03-15 RX ADMIN — Medication 30 MILLIGRAM(S): at 15:13

## 2021-03-15 RX ADMIN — Medication 48 MILLIGRAM(S): at 15:14

## 2021-03-15 RX ADMIN — INSULIN GLARGINE 8 UNIT(S): 100 INJECTION, SOLUTION SUBCUTANEOUS at 02:06

## 2021-03-15 RX ADMIN — INSULIN GLARGINE 10 UNIT(S): 100 INJECTION, SOLUTION SUBCUTANEOUS at 22:46

## 2021-03-15 RX ADMIN — Medication 2: at 17:31

## 2021-03-15 NOTE — CONSULT NOTE ADULT - ASSESSMENT
70 yr old F with PMH of CAD s/p PCI, DM2 uncontrolled A1C 13.9%, ?CKD here with acute CVA. High risk patient with high level decision making.

## 2021-03-15 NOTE — SWALLOW BEDSIDE ASSESSMENT ADULT - COMMENTS
Progress Note 3/14/21:  71 y/o woman with pmhx of CAD s/p stent 6 years ago, Dm2, ?CKD, HTN who presents to the ER with 1-2 days of generalized weakness with fall at home and slurred speech admitted for stroke.     MRI 3/14/21: Abnormal T2 prolongation with restricted diffusion is seen involving the posterior limb of the right internal capsule as well as the right periatrial and right corona radiata region. These findings are compatible areas of acute infarcts. No hemorrhagic transformation is seen. No significant shift or herniation seen  CXR 3/13/21: clear lungs    Patient was seen upright at bedside. Patient was alert/awake and verbally responsive to simple questions. Patient able to follow simple directions. Patient noted with left sided facial weakness during oral mechanism assessment. Lunch tray present upon arrival.

## 2021-03-15 NOTE — OCCUPATIONAL THERAPY INITIAL EVALUATION ADULT - PERTINENT HX OF CURRENT PROBLEM, REHAB EVAL
71 y/o woman with pmhx of CAD s/p stent 6 years ago, Dm2, ?CKD, HTN who presents to the ER with 1-2 days of generalized weakness with fall at home and slurred speech. Found in the ER to have +L side facial droop concerning for stroke. MRI head + for acute infarct right corona radiata and right parietal lobe

## 2021-03-15 NOTE — PROGRESS NOTE ADULT - SUBJECTIVE AND OBJECTIVE BOX
Sherry Mims  [facility]  Pager #[phone]    Patient is a 70y old  Female who presents with a chief complaint of generalized weakness, cva (15 Mar 2021 12:21)      SUBJECTIVE / OVERNIGHT EVENTS: Patient seen and examined at bedside. Patient reports feeling well, has some left arm weakness. States that she will not be able to inject herself with insulin.    ADDITIONAL REVIEW OF SYSTEMS:    MEDICATIONS  (STANDING):  aspirin  chewable 81 milliGRAM(s) Oral daily  atorvastatin 80 milliGRAM(s) Oral at bedtime  dextrose 40% Gel 15 Gram(s) Oral once  dextrose 5%. 1000 milliLiter(s) (50 mL/Hr) IV Continuous <Continuous>  dextrose 5%. 1000 milliLiter(s) (100 mL/Hr) IV Continuous <Continuous>  dextrose 50% Injectable 25 Gram(s) IV Push once  dextrose 50% Injectable 12.5 Gram(s) IV Push once  dextrose 50% Injectable 25 Gram(s) IV Push once  fenofibrate Tablet 48 milliGRAM(s) Oral daily  glucagon  Injectable 1 milliGRAM(s) IntraMuscular once  heparin   Injectable 5000 Unit(s) SubCutaneous every 12 hours  insulin glargine Injectable (LANTUS) 10 Unit(s) SubCutaneous at bedtime  insulin lispro (ADMELOG) corrective regimen sliding scale   SubCutaneous three times a day before meals  insulin lispro (ADMELOG) corrective regimen sliding scale   SubCutaneous at bedtime  insulin lispro Injectable (ADMELOG) 1 Unit(s) SubCutaneous three times a day before meals    MEDICATIONS  (PRN):      CAPILLARY BLOOD GLUCOSE      POCT Blood Glucose.: 212 mg/dL (15 Mar 2021 12:15)  POCT Blood Glucose.: 147 mg/dL (15 Mar 2021 08:32)  POCT Blood Glucose.: 232 mg/dL (15 Mar 2021 01:52)  POCT Blood Glucose.: 200 mg/dL (14 Mar 2021 21:17)  POCT Blood Glucose.: 131 mg/dL (14 Mar 2021 19:33)    I&O's Summary    14 Mar 2021 07:01  -  15 Mar 2021 07:00  --------------------------------------------------------  IN: 0 mL / OUT: 800 mL / NET: -800 mL        PHYSICAL EXAM:    Vital Signs Last 24 Hrs  T(C): 36.8 (15 Mar 2021 11:00), Max: 37.1 (15 Mar 2021 01:25)  T(F): 98.2 (15 Mar 2021 11:00), Max: 98.8 (15 Mar 2021 01:25)  HR: 80 (15 Mar 2021 11:00) (71 - 87)  BP: 214/81 (15 Mar 2021 11:00) (156/96 - 222/89)  BP(mean): --  RR: 16 (15 Mar 2021 11:00) (16 - 16)  SpO2: 100% (15 Mar 2021 11:00) (99% - 100%)    CONSTITUTIONAL: NAD, well-developed, well-groomed  EYES: Conjunctiva and sclera clear  ENMT: Moist oral mucosa  RESPIRATORY: Normal respiratory effort; lungs are clear to auscultation bilaterally  CARDIOVASCULAR: Regular rate and rhythm, normal S1 and S2, no murmur/rub/gallop; No lower extremity edema  ABDOMEN: Nontender to palpation, normoactive bowel sounds  MUSCULOSKELETAL: No clubbing or cyanosis of digits  PSYCH: A+O to person, place, and time; affect appropriate  NEUROLOGY: 4/5 strength LUE, 5/5 strength other extremities  SKIN: No rashes; no palpable lesions    LABS:                        10.7   11.71 )-----------( 382      ( 14 Mar 2021 06:23 )             32.9     03-15    135  |  104  |  23  ----------------------------<  214<H>  4.4   |  22  |  1.91<H>    Ca    9.1      15 Mar 2021 06:38  Phos  3.2     03-15  Mg     2.0     03-15    TPro  6.5  /  Alb  3.3  /  TBili  <0.2  /  DBili  x   /  AST  17  /  ALT  16  /  AlkPhos  76  03-13    PT/INR - ( 14 Mar 2021 06:23 )   PT: 10.9 sec;   INR: 0.95 ratio          Urinalysis Basic - ( 13 Mar 2021 23:34 )    Color: Light Yellow / Appearance: Clear / S.023 / pH: x  Gluc: x / Ketone: Negative  / Bili: Negative / Urobili: <2 mg/dL   Blood: x / Protein: 300 mg/dL / Nitrite: Negative   Leuk Esterase: Negative / RBC: 2 /HPF / WBC 2-4 /HPF   Sq Epi: x / Non Sq Epi: 2 /HPF / Bacteria: Negative        Culture - Blood (collected 14 Mar 2021 06:33)  Source: .Blood Blood-Venous  Preliminary Report (15 Mar 2021 07:01):    No growth to date.    Culture - Blood (collected 14 Mar 2021 05:54)  Source: .Blood Blood  Preliminary Report (15 Mar 2021 06:01):    No growth to date.    Culture - Urine (collected 13 Mar 2021 23:11)  Source: .Urine Clean Catch (Midstream)  Final Report (15 Mar 2021 08:14):    <10,000 CFU/mL Normal Urogenital Anastasiya    RADIOLOGY & ADDITIONAL TESTS: No new imaging  Results Reviewed: Yes  Imaging Personally Reviewed:  Electrocardiogram Personally Reviewed:    COORDINATION OF CARE:  Care Discussed with Consultants/Other Providers [Y/N]:  Prior or Outpatient Records Reviewed [Y/N]:

## 2021-03-15 NOTE — CONSULT NOTE ADULT - SUBJECTIVE AND OBJECTIVE BOX
Patient is a 70y old  Female who presents with a chief complaint of generalized weakness, cva (14 Mar 2021 15:05)      HPI:  Pt is a 69 y/o woman with pmhx of CAD s/p stent 6 years ago, Dm2, ?CKD, HTN who presents to the ER with 1-2 days of generalized weakness. Pt reports she woke up this AM very weak all over but could still walk. No new changes in meds. She denied any fevers but had chills. no cough, chest pain, palpitations, shortness of breath, abd pain, dysuria or diarrhea. Reportedly per her son she had fallen and was slightly confused but with no LOC or head strike. She was found to have slurred speech at some point as well. She denies any headache or vision changes. Last normal per son around 10 PM friday. in the ER pt was given asa, ceftriaxone and 2L NS.   (14 Mar 2021 05:43)  imaging revealed acute cva involving R internal capsule, R corona radiata.     REVIEW OF SYSTEMS  Constitutional - No fever, No weight loss, No fatigue  HEENT - No eye pain, No visual disturbances, No difficulty hearing, No tinnitus, No vertigo, No neck pain  Respiratory - No cough, No wheezing, No shortness of breath  Cardiovascular - No chest pain, No palpitations  Gastrointestinal - No abdominal pain, No nausea, No vomiting, No diarrhea, No constipation  Genitourinary - No dysuria, No frequency, No hematuria, No incontinence  Neurological - No headaches, No memory loss, + loss of strength, No numbness, No tremors  Skin - No itching, No rashes, No lesions   Endocrine - No temperature intolerance  Musculoskeletal - No joint pain, No joint swelling, No muscle pain  Psychiatric - No depression, No anxiety    PAST MEDICAL & SURGICAL HISTORY  Hypertension  DM (diabetes mellitus)  CAD (coronary artery disease)  No significant past surgical history      SOCIAL HISTORY  Smoking - Denied  EtOH - Denied   Drugs - Denied    FUNCTIONAL HISTORY  Lives with  and son in home with 5 stairs to enter, 1 flight to bedroom   Independent    CURRENT FUNCTIONAL STATUS  3/14  Bed Mobility: Sit to Supine:     · Level of Walker	minimum assist (75% patients effort)  · Physical Assist/Nonphysical Assist	1 person + 1 person to manage equipment; 1 person assist; nonverbal cues (demo/gestures); verbal cues  · Assistive Device	bed rails    Bed Mobility: Supine to Sit:     · Level of Walker	minimum assist (75% patients effort)  · Physical Assist/Nonphysical Assist	1 person assist; nonverbal cues (demo/gestures); verbal cues  · Assistive Device	bed rails    Bed Mobility Analysis:     · Bed Mobility Limitations	impaired ability to control trunk for mobility; decreased ability to use legs for bridging/pushing  · Impairments Contributing to Impaired Bed Mobility	impaired balance; impaired motor control; impaired postural control; decreased strength    Transfer: Sit to Stand:     · Level of Walker	moderate assist (50% patients effort)  · Physical Assist/Nonphysical Assist	1 person assist; nonverbal cues (demo/gestures); verbal cues  · Weight-Bearing Restrictions	full weight-bearing  · Assistive Device	handheld    Transfer: Stand to Sit:     · Level of Walker	moderate assist (50% patients effort)  · Physical Assist/Nonphysical Assist	1 person assist; nonverbal cues (demo/gestures); verbal cues  · Weight-Bearing Restrictions	full weight-bearing  · Assistive Device	handheld    Sit/Stand Transfer Safety Analysis:     · Transfer Safety Concerns Noted	decreased weight-shifting ability; losing balance; decreased step length  · Impairments Contributing to Impaired Transfers	impaired balance; impaired motor control; impaired postural control; decreased strength    Gait Skills:     · Level of Walker	unable to perform; due to impaired standing balance        FAMILY HISTORY   No pertinent family history in first degree relatives        RECENT LABS/IMAGING  < from: MR Head No Cont (21 @ 12:37) >    EXAM:  MR BRAIN      EXAM:  MR ANGIO NECK      EXAM:  MR ANGIO BRAIN        PROCEDURE DATE:  Mar 14 2021         INTERPRETATION:  Clinical indication: Dizziness.    MRI the brain was performed using sagittal T1, axial T1 T2 T2 FLAIR diffusion and susceptibility weighted sequence.    MRA of the neck was formed using 2-D and 3-D time flight technique.    MRA of the [place] was performed using 3-D [place] technique    Parenchymal volume loss and chronic microvascular ischemic changes are identified    Abnormal T2 prolongation with restricted diffusion is seen involving the posterior limb of the right internal capsule as well as the right periatrial and right corona radiata region. These findings are compatible areas of acute infarcts. No hemorrhagic transformation is seen. No significant shift or herniation seen    There are no abnormal intra or extra collections seen    The large vessels demonstrate normal flow voids.    The visualized paranasal sinuses appear clear    The patient is status post bilateral cataract surgery.    Both distal common carotid arteries appear normal. Dilation of both proximal internal and external carotid arteries appear normal. Both vertebral arteries appear normal.    Evaluation of the distalright internal carotid artery appears normal.    There is evidence of abnormal flow related signal seen involving the distal left internal carotid artery. This appears to arise from the proximal supraclinoid segment and is best seen on series 3 image98. This could be compatible with a small aneurysm. This finding measures approximately 3.4 mm. CTA of the [place] can be done for further evaluation. Evaluation of both anterior cerebral left middle cerebral basilar and right posterior cerebral artery appear normal. There is evidence of short segment stenosis involving the proximal left posterior cerebral artery. Short segment of stenosis is also seen involving the proximal right M2 segment.    IMPRESSION: Acute infarcts as described above.    MRA of the neck appears normal    MRA of the [place] demonstrates suspected aneurysm involving the distal left internal carotid artery. CTA of the [place] can be done for further evaluation.    Short segment of stenosis seen involving the left PCA and right MCA arteries as described above.              RADHA LANDEROS M.D., ATTENDING RADIOLOGIST  This document has been electronically signed. Mar 14 2021 12:42PM    < end of copied text >    CBC Full  -  ( 14 Mar 2021 06:23 )  WBC Count : 11.71 K/uL  RBC Count : 3.93 M/uL  Hemoglobin : 10.7 g/dL  Hematocrit : 32.9 %  Platelet Count - Automated : 382 K/uL  Mean Cell Volume : 83.7 fL  Mean Cell Hemoglobin : 27.2 pg  Mean Cell Hemoglobin Concentration : 32.5 gm/dL  Auto Neutrophil # : x  Auto Lymphocyte # : x  Auto Monocyte # : x  Auto Eosinophil # : x  Auto Basophil # : x  Auto Neutrophil % : x  Auto Lymphocyte % : x  Auto Monocyte % : x  Auto Eosinophil % : x  Auto Basophil % : x    03-15    135  |  104  |  23  ----------------------------<  214<H>  4.4   |  22  |  1.91<H>    Ca    9.1      15 Mar 2021 06:38  Phos  3.2     03-15  Mg     2.0     03-15    TPro  6.5  /  Alb  3.3  /  TBili  <0.2  /  DBili  x   /  AST  17  /  ALT  16  /  AlkPhos  76  03-13    Urinalysis Basic - ( 13 Mar 2021 23:34 )    Color: Light Yellow / Appearance: Clear / S.023 / pH: x  Gluc: x / Ketone: Negative  / Bili: Negative / Urobili: <2 mg/dL   Blood: x / Protein: 300 mg/dL / Nitrite: Negative   Leuk Esterase: Negative / RBC: 2 /HPF / WBC 2-4 /HPF   Sq Epi: x / Non Sq Epi: 2 /HPF / Bacteria: Negative        VITALS  T(C): 36.8 (03-15-21 @ 05:05), Max: 37.1 (03-15-21 @ 01:25)  HR: 71 (03-15-21 @ 05:05) (71 - 87)  BP: 156/96 (03-15-21 @ 05:05) (156/96 - 222/89)  RR: 16 (03-15-21 @ 05:05) (16 - 17)  SpO2: 100% (03-15-21 @ 05:05) (99% - 100%)  Wt(kg): --    ALLERGIES  No Known Allergies      MEDICATIONS   aspirin  chewable 81 milliGRAM(s) Oral daily  atorvastatin 80 milliGRAM(s) Oral at bedtime  dextrose 40% Gel 15 Gram(s) Oral once  dextrose 5%. 1000 milliLiter(s) IV Continuous <Continuous>  dextrose 5%. 1000 milliLiter(s) IV Continuous <Continuous>  dextrose 50% Injectable 25 Gram(s) IV Push once  dextrose 50% Injectable 12.5 Gram(s) IV Push once  dextrose 50% Injectable 25 Gram(s) IV Push once  fenofibrate Tablet 48 milliGRAM(s) Oral daily  glucagon  Injectable 1 milliGRAM(s) IntraMuscular once  heparin   Injectable 5000 Unit(s) SubCutaneous every 12 hours  insulin glargine Injectable (LANTUS) 8 Unit(s) SubCutaneous at bedtime  insulin lispro (ADMELOG) corrective regimen sliding scale   SubCutaneous three times a day before meals  insulin lispro (ADMELOG) corrective regimen sliding scale   SubCutaneous at bedtime      ----------------------------------------------------------------------------------------  PHYSICAL EXAM  Constitutional - NAD, Comfortable  HEENT - NCAT, EOMI  Neck - Supple, No limited ROM  Chest - CTA bilaterally, No wheeze, No rhonchi, No crackles  Cardiovascular - RRR, S1S2, No murmurs  Abdomen - BS+, Soft, NTND  Extremities - No C/C/E, No calf tenderness   Neurologic Exam -                    Cognitive - Awake, Alert, AAO to self, place, date, year, situation     Communication - Fluent, No dysarthria     Cranial Nerves - CN 2-12 intact     Motor - No focal deficits                    LEFT    UE - ShAB 5/5, EF 5/5, EE 5/5, WE 5/5,  5/5                    RIGHT UE - ShAB 5/5, EF 5/5, EE 5/5, WE 5/5,  5/5                    LEFT    LE - HF 5/5, KE 5/5, DF 5/5, PF 5/5                    RIGHT LE - HF 5/5, KE 5/5, DF 5/5, PF 5/5        Sensory - Intact to LT     Reflexes - DTR Intact, No primitive reflexive     Coordination - FTN intact     OculoVestibular - No saccades, No nystagmus, VOR         Balance - WNL Static  Psychiatric - Mood stable, Affect WNL  ----------------------------------------------------------------------------------------  ASSESSMENT/PLAN        DVT PPX - heparin  Diet: dysphagia 2   Rehab -    Recommend ACUTE inpatient rehabilitation for the functional deficits consisting of 3 hours of therapy/day & 24 hour RN/daily PMR physician for comorbid medical management. Will continue to follow for ongoing rehab needs and recommendations.  Patient is a 70y old  Female who presents with a chief complaint of generalized weakness, cva (14 Mar 2021 15:05)      HPI:  Pt is a 69 y/o woman with pmhx of CAD s/p stent 6 years ago, Dm2, ?CKD, HTN who presents to the ER with 1-2 days of generalized weakness. Pt reports she woke up this AM very weak all over but could still walk. No new changes in meds. She denied any fevers but had chills. no cough, chest pain, palpitations, shortness of breath, abd pain, dysuria or diarrhea. Reportedly per her son she had fallen and was slightly confused but with no LOC or head strike. She was found to have slurred speech at some point as well. She denies any headache or vision changes. Last normal per son around 10 PM friday. in the ER pt was given asa, ceftriaxone and 2L NS.   (14 Mar 2021 05:43)  imaging revealed acute cva involving R internal capsule, R corona radiata.     REVIEW OF SYSTEMS  Constitutional - No fever, No weight loss, No fatigue  HEENT - No eye pain, No visual disturbances, No difficulty hearing, No tinnitus, No vertigo, No neck pain  Respiratory - No cough, No wheezing, No shortness of breath  Cardiovascular - No chest pain, No palpitations  Gastrointestinal - No abdominal pain, No nausea, No vomiting, No diarrhea, No constipation  Genitourinary - No dysuria, No frequency, No hematuria, No incontinence  Neurological - No headaches, No memory loss, + loss of strength, No numbness, No tremors  Skin - No itching, No rashes, No lesions   Endocrine - No temperature intolerance  Musculoskeletal - No joint pain, No joint swelling, No muscle pain  Psychiatric - No depression, No anxiety    PAST MEDICAL & SURGICAL HISTORY  Hypertension  DM (diabetes mellitus)  CAD (coronary artery disease)  No significant past surgical history      SOCIAL HISTORY  Smoking - Denied  EtOH - Denied   Drugs - Denied    FUNCTIONAL HISTORY  Lives with  and son in home with 5 stairs to enter, 1 flight to bedroom   Independent    CURRENT FUNCTIONAL STATUS  3/14  Bed Mobility: Sit to Supine:     · Level of [place]	minimum assist (75% patients effort)  · Physical Assist/Nonphysical Assist	1 person + 1 person to manage equipment; 1 person assist; nonverbal cues (demo/gestures); verbal cues  · Assistive Device	bed rails    Bed Mobility: Supine to Sit:     · Level of [place]	minimum assist (75% patients effort)  · Physical Assist/Nonphysical Assist	1 person assist; nonverbal cues (demo/gestures); verbal cues  · Assistive Device	bed rails    Bed Mobility Analysis:     · Bed Mobility Limitations	impaired ability to control trunk for mobility; decreased ability to use legs for bridging/pushing  · Impairments Contributing to Impaired Bed Mobility	impaired balance; impaired motor control; impaired postural control; decreased strength    Transfer: Sit to Stand:     · Level of [place]	moderate assist (50% patients effort)  · Physical Assist/Nonphysical Assist	1 person assist; nonverbal cues (demo/gestures); verbal cues  · Weight-Bearing Restrictions	full weight-bearing  · Assistive Device	handheld    Transfer: Stand to Sit:     · Level of [place]	moderate assist (50% patients effort)  · Physical Assist/Nonphysical Assist	1 person assist; nonverbal cues (demo/gestures); verbal cues  · Weight-Bearing Restrictions	full weight-bearing  · Assistive Device	handheld    Sit/Stand Transfer Safety Analysis:     · Transfer Safety Concerns Noted	decreased weight-shifting ability; losing balance; decreased step length  · Impairments Contributing to Impaired Transfers	impaired balance; impaired motor control; impaired postural control; decreased strength    Gait Skills:     · Level of [place]	unable to perform; due to impaired standing balance        FAMILY HISTORY   No pertinent family history in first degree relatives        RECENT LABS/IMAGING  < from: MR Head No Cont (21 @ 12:37) >    EXAM:  MR BRAIN      EXAM:  MR ANGIO NECK      EXAM:  MR ANGIO BRAIN        PROCEDURE DATE:  Mar 14 2021         INTERPRETATION:  Clinical indication: Dizziness.    MRI the brain was performed using sagittal T1, axial T1 T2 T2 FLAIR diffusion and susceptibility weighted sequence.    MRA of the neck was formed using 2-D and 3-D time flight technique.    MRA of the [place] was performed using 3-D [place] technique    Parenchymal volume loss and chronic microvascular ischemic changes are identified    Abnormal T2 prolongation with restricted diffusion is seen involving the posterior limb of the right internal capsule as well as the right periatrial and right corona radiata region. These findings are compatible areas of acute infarcts. No hemorrhagic transformation is seen. No significant shift or herniation seen    There are no abnormal intra or extra collections seen    The large vessels demonstrate normal flow voids.    The visualized paranasal sinuses appear clear    The patient is status post bilateral cataract surgery.    Both distal common carotid arteries appear normal. Dilation of both proximal internal and external carotid arteries appear normal. Both vertebral arteries appear normal.    Evaluation of the distalright internal carotid artery appears normal.    There is evidence of abnormal flow related signal seen involving the distal left internal carotid artery. This appears to arise from the proximal supraclinoid segment and is best seen on series 3 image98. This could be compatible with a small aneurysm. This finding measures approximately 3.4 mm. CTA of the [place] can be done for further evaluation. Evaluation of both anterior cerebral left middle cerebral basilar and right posterior cerebral artery appear normal. There is evidence of short segment stenosis involving the proximal left posterior cerebral artery. Short segment of stenosis is also seen involving the proximal right M2 segment.    IMPRESSION: Acute infarcts as described above.    MRA of the neck appears normal    MRA of the [place] demonstrates suspected aneurysm involving the distal left internal carotid artery. CTA of the [place] can be done for further evaluation.    Short segment of stenosis seen involving the left PCA and right MCA arteries as described above.              RADHA LANDEROS M.D., ATTENDING RADIOLOGIST  This document has been electronically signed. Mar 14 2021 12:42PM    < end of copied text >    CBC Full  -  ( 14 Mar 2021 06:23 )  WBC Count : 11.71 K/uL  RBC Count : 3.93 M/uL  Hemoglobin : 10.7 g/dL  Hematocrit : 32.9 %  Platelet Count - Automated : 382 K/uL  Mean Cell Volume : 83.7 fL  Mean Cell Hemoglobin : 27.2 pg  Mean Cell Hemoglobin Concentration : 32.5 gm/dL  Auto Neutrophil # : x  Auto Lymphocyte # : x  Auto Monocyte # : x  Auto Eosinophil # : x  Auto Basophil # : x  Auto Neutrophil % : x  Auto Lymphocyte % : x  Auto Monocyte % : x  Auto Eosinophil % : x  Auto Basophil % : x    03-15    135  |  104  |  23  ----------------------------<  214<H>  4.4   |  22  |  1.91<H>    Ca    9.1      15 Mar 2021 06:38  Phos  3.2     03-15  Mg     2.0     03-15    TPro  6.5  /  Alb  3.3  /  TBili  <0.2  /  DBili  x   /  AST  17  /  ALT  16  /  AlkPhos  76  03-13    Urinalysis Basic - ( 13 Mar 2021 23:34 )    Color: Light Yellow / Appearance: Clear / S.023 / pH: x  Gluc: x / Ketone: Negative  / Bili: Negative / Urobili: <2 mg/dL   Blood: x / Protein: 300 mg/dL / Nitrite: Negative   Leuk Esterase: Negative / RBC: 2 /HPF / WBC 2-4 /HPF   Sq Epi: x / Non Sq Epi: 2 /HPF / Bacteria: Negative        VITALS  T(C): 36.8 (03-15-21 @ 05:05), Max: 37.1 (03-15-21 @ 01:25)  HR: 71 (03-15-21 @ 05:05) (71 - 87)  BP: 156/96 (03-15-21 @ 05:05) (156/96 - 222/89)  RR: 16 (03-15-21 @ 05:05) (16 - 17)  SpO2: 100% (03-15-21 @ 05:05) (99% - 100%)  Wt(kg): --    ALLERGIES  No Known Allergies      MEDICATIONS   aspirin  chewable 81 milliGRAM(s) Oral daily  atorvastatin 80 milliGRAM(s) Oral at bedtime  dextrose 40% Gel 15 Gram(s) Oral once  dextrose 5%. 1000 milliLiter(s) IV Continuous <Continuous>  dextrose 5%. 1000 milliLiter(s) IV Continuous <Continuous>  dextrose 50% Injectable 25 Gram(s) IV Push once  dextrose 50% Injectable 12.5 Gram(s) IV Push once  dextrose 50% Injectable 25 Gram(s) IV Push once  fenofibrate Tablet 48 milliGRAM(s) Oral daily  glucagon  Injectable 1 milliGRAM(s) IntraMuscular once  heparin   Injectable 5000 Unit(s) SubCutaneous every 12 hours  insulin glargine Injectable (LANTUS) 8 Unit(s) SubCutaneous at bedtime  insulin lispro (ADMELOG) corrective regimen sliding scale   SubCutaneous three times a day before meals  insulin lispro (ADMELOG) corrective regimen sliding scale   SubCutaneous at bedtime      ----------------------------------------------------------------------------------------  PHYSICAL EXAM  Constitutional - NAD, Comfortable  HEENT - NCAT, EOMI  Neck - Supple, No limited ROM  Chest - no respiratory distress  Cardiovascular - RRR, S1S2   Abdomen -  Soft, NTND  Extremities - No C/C/E, No calf tenderness   Neurologic Exam -                    Cognitive - Awake, Alert, AAO to self, place, date, year, situation     Communication - Fluent, + dysarthria     Cranial Nerves - decreased nasolabial fold on the L     Motor - No focal deficits                    LEFT    UE - ShAB 4/5, EF 4/5, EE 4/5, WE 4/5,  4/5                    RIGHT UE - ShAB 5/5, EF 5/5, EE 5/5, WE 5/5,  5/5                    LEFT    LE - HF 4+/5, KE 4+/5, DF 5/5, PF 5/5                    RIGHT LE - HF 5/5, KE 5/5, DF 5/5, PF 5/5        Sensory - Intact to LT      Coordination - FTN intact on the R, impaired on the L      Balance - WNL Static  Psychiatric - Mood stable, Affect WNL  ----------------------------------------------------------------------------------------  ASSESSMENT/PLAN  71 yo f pmh cad s/p stent, dm2 presented with L sided weakness and impaired coordination, found to have acute cva   on asa  DVT PPX - heparin  Diet: dysphagia 2   continue bedside PT;  OT eval pending  SLP  Rehab -    Recommend ACUTE inpatient rehabilitation for the functional deficits consisting of 3 hours of therapy/day & 24 hour RN/daily PMR physician for comorbid medical management. Will continue to follow for ongoing rehab needs and recommendations.     plan discussed with patient and her daughter

## 2021-03-15 NOTE — CONSULT NOTE ADULT - SUBJECTIVE AND OBJECTIVE BOX
HPI:  Pt is a 71 y/o woman with pmhx of CAD s/p stent 6 years ago, Dm2, ?CKD, HTN who presents to the ER with 1-2 days of generalized weakness. Pt reports she woke up this AM very weak all over but could still walk. No new changes in meds. She denied any fevers but had chills. no cough, chest pain, palpitations, shortness of breath, abd pain, dysuria or diarrhea. Reportedly per her son she had fallen and was slightly confused but with no LOC or head strike. She was found to have slurred speech at some point as well. She denies any headache or vision changes. Last normal per son around 10 PM friday. in the ER pt was given asa, ceftriaxone and 2L NS.   (14 Mar 2021 05:43)        Endocrine History: 70 yr old F with PMH of CAD s/p PCI, DM2, ?CKD here with acute CVA. Patient has been following with her PMD for diabetes management. States she was diagnosed with DM a few months ago. On arrival noted to have FS in 400s. Patient is not familiar with her medications. As per home med rec, she takes metformin ER 500mg daily, januvia 100mg daily and glimepiride 4mg daily. She has never been on insulin in the past. On arrival noted to have FS in 400s not in DKA. States her appetite is fair. Admits to a lot of rice intake in her diet at home. Avoids soda and fruit juice.         PAST MEDICAL & SURGICAL HISTORY:  Hypertension    DM (diabetes mellitus)    CAD (coronary artery disease)    No significant past surgical history        FAMILY HISTORY:  No FH of DM        Social History: Lives with  and son, No ETOH use    Outpatient Medications: Metformin, Januvia, glimepiride, atorvastatin, fenofibrate, hydralazine    MEDICATIONS  (STANDING):  aspirin  chewable 81 milliGRAM(s) Oral daily  atorvastatin 80 milliGRAM(s) Oral at bedtime  dextrose 40% Gel 15 Gram(s) Oral once  dextrose 5%. 1000 milliLiter(s) (50 mL/Hr) IV Continuous <Continuous>  dextrose 5%. 1000 milliLiter(s) (100 mL/Hr) IV Continuous <Continuous>  dextrose 50% Injectable 25 Gram(s) IV Push once  dextrose 50% Injectable 12.5 Gram(s) IV Push once  dextrose 50% Injectable 25 Gram(s) IV Push once  fenofibrate Tablet 48 milliGRAM(s) Oral daily  glucagon  Injectable 1 milliGRAM(s) IntraMuscular once  heparin   Injectable 5000 Unit(s) SubCutaneous every 12 hours  insulin glargine Injectable (LANTUS) 8 Unit(s) SubCutaneous at bedtime  insulin lispro (ADMELOG) corrective regimen sliding scale   SubCutaneous three times a day before meals  insulin lispro (ADMELOG) corrective regimen sliding scale   SubCutaneous at bedtime    MEDICATIONS  (PRN):      Allergies    No Known Allergies    Intolerances      Review of Systems:  Constitutional: No fever  Eyes: No blurry vision  Neuro:+ L sided weakness  HEENT: No pain  Cardiovascular: No chest pain, palpitations  Respiratory: No SOB, no cough  GI: No nausea, vomiting, abdominal pain  : No dysuria  Skin: no rash  Psych: no depression  Endocrine: no polyuria, polydipsia      ALL OTHER SYSTEMS REVIEWED AND NEGATIVE        PHYSICAL EXAM:  VITALS: T(C): 36.8 (03-15-21 @ 11:00)  T(F): 98.2 (03-15-21 @ 11:00), Max: 98.8 (03-15-21 @ 01:25)  HR: 80 (03-15-21 @ 11:00) (71 - 87)  BP: 214/81 (03-15-21 @ 11:00) (156/96 - 222/89)  RR:  (16 - 17)  SpO2:  (99% - 100%)  Wt(kg): --  GENERAL: NAD, well-groomed, well-developed  EYES: No proptosis, no lid lag, anicteric  HEENT:  Atraumatic, Normocephalic, moist mucous membranes  RESPIRATORY: Clear to auscultation bilaterally; No rales, rhonchi, wheezing, or rubs  CARDIOVASCULAR: Regular rate and rhythm  GI: Soft, nontender, non distended, normal bowel sounds  SKIN: Dry, intact, No rashes or lesions  NEURO: +L sided weakness  PSYCH: Alert and oriented x 3, normal affect, normal mood      POCT Blood Glucose.: 212 mg/dL (03-15-21 @ 12:15)  POCT Blood Glucose.: 147 mg/dL (03-15-21 @ 08:32)  POCT Blood Glucose.: 232 mg/dL (03-15-21 @ 01:52)  POCT Blood Glucose.: 200 mg/dL (03-14-21 @ 21:17)  POCT Blood Glucose.: 131 mg/dL (03-14-21 @ 19:33)  POCT Blood Glucose.: 210 mg/dL (03-14-21 @ 12:53)  POCT Blood Glucose.: 212 mg/dL (03-14-21 @ 06:15)  POCT Blood Glucose.: 202 mg/dL (03-14-21 @ 01:53)  POCT Blood Glucose.: 480 mg/dL (03-13-21 @ 20:49)                            10.7   11.71 )-----------( 382      ( 14 Mar 2021 06:23 )             32.9       03-15    135  |  104  |  23  ----------------------------<  214<H>  4.4   |  22  |  1.91<H>    EGFR if : 31<L>  EGFR if non : 26<L>    Ca    9.1      03-15  Mg     2.0     03-15  Phos  3.2     03-15    TPro  6.5  /  Alb  3.3  /  TBili  <0.2  /  DBili  x   /  AST  17  /  ALT  16  /  AlkPhos  76  03-13      Thyroid Function Tests:  03-13 @ 23:00 TSH 2.54 FreeT4 -- T3 -- Anti TPO -- Anti Thyroglobulin Ab -- TSI --          03-14 Chol 263<H> LDL -- HDL 34<L> Trig 396<H>

## 2021-03-15 NOTE — PROGRESS NOTE ADULT - ASSESSMENT
69 y/o woman with pmhx of CAD s/p stent 6 years ago, Dm2, ?CKD, HTN who presents to the ER with 1-2 days of generalized weakness with fall at home and slurred speech admitted for stroke.

## 2021-03-15 NOTE — CONSULT NOTE ADULT - PROBLEM SELECTOR RECOMMENDATION 9
While inpatient CHO diet and FS AC and HS  Goal Glucose 100-180  Recommend Lantus 10 Units HS and start Admelog 1 Unit TIDAC plus low scale before meals and at bedtime  Please check Islet cell and JOJO Ab to r/o BO as well as C-peptide  Recommend basal/bolus regimen on discharge with discontinuation of orals  Patient is requesting SW consult for visiting nurse  Patient's family will require insulin pen teaching  Nutrition consult While inpatient CHO diet and FS AC and HS  Goal Glucose 100-180  Recommend Lantus 10 Units HS and start Admelog 1 Unit TIDAC plus low scale before meals and at bedtime  Please check Islet cell and JOJO Ab to r/o BO as well as C-peptide  Recommend basal/bolus regimen on discharge with discontinuation of orals  Patient is requesting SW consult for visiting nurse  Patient's family will require insulin pen teaching  Nutrition consult  Pt can f/u at [address] [facility] [place] [phone]

## 2021-03-15 NOTE — CONSULT NOTE ADULT - PROBLEM SELECTOR PROBLEM 3
Ordered patient lunch tray  Patient resting comfortably with lights and tv on  No other wants or complaints at this time  Aunt still at bedside  1;1 sitter is present       Alex Eisenmenger  02/20/21 7641 Hyperlipidemia, unspecified hyperlipidemia type

## 2021-03-15 NOTE — SWALLOW BEDSIDE ASSESSMENT ADULT - SWALLOW EVAL: DIAGNOSIS
Patient presents with mild oral phase dysphagia and functional pharyngeal swallow. The oral phase was marked by adequate stripping of bolus from utensil, adequate oral containment, extended mastication for regular solids with slow bolus manipulation. The pharyngeal phase was marked by laryngeal elevation upon digital palpation with initiation of pharyngeal swallow. No overt s/s of laryngeal penetration/aspiration for puree consistency, solids and thin liquids.

## 2021-03-16 LAB
ANION GAP SERPL CALC-SCNC: 10 MMOL/L — SIGNIFICANT CHANGE UP (ref 7–14)
BUN SERPL-MCNC: 28 MG/DL — HIGH (ref 7–23)
C PEPTIDE SERPL-MCNC: 4 NG/ML — SIGNIFICANT CHANGE UP (ref 1.1–4.4)
CALCIUM SERPL-MCNC: 8.5 MG/DL — SIGNIFICANT CHANGE UP (ref 8.4–10.5)
CHLORIDE SERPL-SCNC: 100 MMOL/L — SIGNIFICANT CHANGE UP (ref 98–107)
CO2 SERPL-SCNC: 22 MMOL/L — SIGNIFICANT CHANGE UP (ref 22–31)
CREAT SERPL-MCNC: 1.98 MG/DL — HIGH (ref 0.5–1.3)
GLUCOSE BLDC GLUCOMTR-MCNC: 254 MG/DL — HIGH (ref 70–99)
GLUCOSE BLDC GLUCOMTR-MCNC: 258 MG/DL — HIGH (ref 70–99)
GLUCOSE BLDC GLUCOMTR-MCNC: 261 MG/DL — HIGH (ref 70–99)
GLUCOSE BLDC GLUCOMTR-MCNC: 267 MG/DL — HIGH (ref 70–99)
GLUCOSE BLDC GLUCOMTR-MCNC: 301 MG/DL — HIGH (ref 70–99)
GLUCOSE SERPL-MCNC: 327 MG/DL — HIGH (ref 70–99)
HCT VFR BLD CALC: 32.9 % — LOW (ref 34.5–45)
HGB BLD-MCNC: 10.8 G/DL — LOW (ref 11.5–15.5)
MAGNESIUM SERPL-MCNC: 2 MG/DL — SIGNIFICANT CHANGE UP (ref 1.6–2.6)
MCHC RBC-ENTMCNC: 26.9 PG — LOW (ref 27–34)
MCHC RBC-ENTMCNC: 32.8 GM/DL — SIGNIFICANT CHANGE UP (ref 32–36)
MCV RBC AUTO: 82 FL — SIGNIFICANT CHANGE UP (ref 80–100)
NRBC # BLD: 0 /100 WBCS — SIGNIFICANT CHANGE UP
NRBC # FLD: 0 K/UL — SIGNIFICANT CHANGE UP
PHOSPHATE SERPL-MCNC: 4.2 MG/DL — SIGNIFICANT CHANGE UP (ref 2.5–4.5)
PLATELET # BLD AUTO: 382 K/UL — SIGNIFICANT CHANGE UP (ref 150–400)
POTASSIUM SERPL-MCNC: 4.4 MMOL/L — SIGNIFICANT CHANGE UP (ref 3.5–5.3)
POTASSIUM SERPL-SCNC: 4.4 MMOL/L — SIGNIFICANT CHANGE UP (ref 3.5–5.3)
RBC # BLD: 4.01 M/UL — SIGNIFICANT CHANGE UP (ref 3.8–5.2)
RBC # FLD: 12.4 % — SIGNIFICANT CHANGE UP (ref 10.3–14.5)
SODIUM SERPL-SCNC: 132 MMOL/L — LOW (ref 135–145)
WBC # BLD: 10.27 K/UL — SIGNIFICANT CHANGE UP (ref 3.8–10.5)
WBC # FLD AUTO: 10.27 K/UL — SIGNIFICANT CHANGE UP (ref 3.8–10.5)

## 2021-03-16 PROCEDURE — 99233 SBSQ HOSP IP/OBS HIGH 50: CPT

## 2021-03-16 PROCEDURE — 99232 SBSQ HOSP IP/OBS MODERATE 35: CPT

## 2021-03-16 RX ORDER — NIFEDIPINE 30 MG
60 TABLET, EXTENDED RELEASE 24 HR ORAL DAILY
Refills: 0 | Status: DISCONTINUED | OUTPATIENT
Start: 2021-03-17 | End: 2021-03-17

## 2021-03-16 RX ORDER — LANOLIN ALCOHOL/MO/W.PET/CERES
3 CREAM (GRAM) TOPICAL AT BEDTIME
Refills: 0 | Status: DISCONTINUED | OUTPATIENT
Start: 2021-03-16 | End: 2021-03-19

## 2021-03-16 RX ORDER — INSULIN GLARGINE 100 [IU]/ML
12 INJECTION, SOLUTION SUBCUTANEOUS AT BEDTIME
Refills: 0 | Status: DISCONTINUED | OUTPATIENT
Start: 2021-03-16 | End: 2021-03-17

## 2021-03-16 RX ORDER — INSULIN LISPRO 100/ML
4 VIAL (ML) SUBCUTANEOUS
Refills: 0 | Status: DISCONTINUED | OUTPATIENT
Start: 2021-03-16 | End: 2021-03-17

## 2021-03-16 RX ADMIN — Medication 1 UNIT(S): at 12:40

## 2021-03-16 RX ADMIN — ATORVASTATIN CALCIUM 80 MILLIGRAM(S): 80 TABLET, FILM COATED ORAL at 22:43

## 2021-03-16 RX ADMIN — Medication 3: at 17:59

## 2021-03-16 RX ADMIN — Medication 30 MILLIGRAM(S): at 05:31

## 2021-03-16 RX ADMIN — Medication 1: at 22:41

## 2021-03-16 RX ADMIN — Medication 4 UNIT(S): at 17:59

## 2021-03-16 RX ADMIN — HEPARIN SODIUM 5000 UNIT(S): 5000 INJECTION INTRAVENOUS; SUBCUTANEOUS at 05:31

## 2021-03-16 RX ADMIN — Medication 48 MILLIGRAM(S): at 08:41

## 2021-03-16 RX ADMIN — Medication 81 MILLIGRAM(S): at 08:41

## 2021-03-16 RX ADMIN — Medication 1 UNIT(S): at 08:40

## 2021-03-16 RX ADMIN — Medication 4: at 08:40

## 2021-03-16 RX ADMIN — INSULIN GLARGINE 12 UNIT(S): 100 INJECTION, SOLUTION SUBCUTANEOUS at 22:40

## 2021-03-16 RX ADMIN — HEPARIN SODIUM 5000 UNIT(S): 5000 INJECTION INTRAVENOUS; SUBCUTANEOUS at 17:59

## 2021-03-16 RX ADMIN — Medication 3: at 12:40

## 2021-03-16 NOTE — PROGRESS NOTE ADULT - SUBJECTIVE AND OBJECTIVE BOX
Sherry Mims  [facility] Medicine  Pager #[phone]    Patient is a 70y old  Female who presents with a chief complaint of generalized weakness, cva (16 Mar 2021 10:28)      SUBJECTIVE / OVERNIGHT EVENTS: Patient seen and examined at bedside. Patient feeling well but wants to get out of bed. States that she is not able to give herself insulin, wants to see if her  can do it.    ADDITIONAL REVIEW OF SYSTEMS:    MEDICATIONS  (STANDING):  aspirin  chewable 81 milliGRAM(s) Oral daily  atorvastatin 80 milliGRAM(s) Oral at bedtime  dextrose 40% Gel 15 Gram(s) Oral once  dextrose 5%. 1000 milliLiter(s) (50 mL/Hr) IV Continuous <Continuous>  dextrose 5%. 1000 milliLiter(s) (100 mL/Hr) IV Continuous <Continuous>  dextrose 50% Injectable 25 Gram(s) IV Push once  dextrose 50% Injectable 12.5 Gram(s) IV Push once  dextrose 50% Injectable 25 Gram(s) IV Push once  fenofibrate Tablet 48 milliGRAM(s) Oral daily  glucagon  Injectable 1 milliGRAM(s) IntraMuscular once  heparin   Injectable 5000 Unit(s) SubCutaneous every 12 hours  insulin glargine Injectable (LANTUS) 10 Unit(s) SubCutaneous at bedtime  insulin lispro (ADMELOG) corrective regimen sliding scale   SubCutaneous three times a day before meals  insulin lispro (ADMELOG) corrective regimen sliding scale   SubCutaneous at bedtime  insulin lispro Injectable (ADMELOG) 1 Unit(s) SubCutaneous three times a day before meals  NIFEdipine XL 30 milliGRAM(s) Oral daily    MEDICATIONS  (PRN):      CAPILLARY BLOOD GLUCOSE      POCT Blood Glucose.: 301 mg/dL (16 Mar 2021 08:19)  POCT Blood Glucose.: 328 mg/dL (15 Mar 2021 22:41)  POCT Blood Glucose.: 234 mg/dL (15 Mar 2021 17:21)    I&O's Summary    15 Mar 2021 07:01  -  16 Mar 2021 07:00  --------------------------------------------------------  IN: 0 mL / OUT: 1100 mL / NET: -1100 mL        PHYSICAL EXAM:    Vital Signs Last 24 Hrs  T(C): 36.7 (16 Mar 2021 12:02), Max: 37.1 (15 Mar 2021 22:40)  T(F): 98 (16 Mar 2021 12:02), Max: 98.7 (15 Mar 2021 22:40)  HR: 94 (16 Mar 2021 12:02) (76 - 97)  BP: 157/89 (16 Mar 2021 12:02) (157/89 - 203/76)  BP(mean): --  RR: 17 (16 Mar 2021 12:02) (17 - 18)  SpO2: 98% (16 Mar 2021 12:02) (98% - 100%)    CONSTITUTIONAL: NAD, well-developed, well-groomed  EYES: Conjunctiva and sclera clear  ENMT: Moist oral mucosa  RESPIRATORY: Normal respiratory effort; lungs are clear to auscultation bilaterally  CARDIOVASCULAR: Regular rate and rhythm, normal S1 and S2, no murmur/rub/gallop; No lower extremity edema  ABDOMEN: Nontender to palpation, normoactive bowel sounds  MUSCULOSKELETAL: No clubbing or cyanosis of digits  PSYCH: A+O to person, place, and time; affect appropriate  NEUROLOGY: 4/5 strength LUE, 5/5 strength other extremities  SKIN: No rashes; no palpable lesions    LABS:                        10.8   10.27 )-----------( 382      ( 16 Mar 2021 08:07 )             32.9     03-16    132<L>  |  100  |  28<H>  ----------------------------<  327<H>  4.4   |  22  |  1.98<H>    Ca    8.5      16 Mar 2021 08:07  Phos  4.2     03-16  Mg     2.0     03-16      Culture - Blood (collected 14 Mar 2021 06:33)  Source: .Blood Blood-Venous  Preliminary Report (15 Mar 2021 07:01):    No growth to date.    Culture - Blood (collected 14 Mar 2021 05:54)  Source: .Blood Blood  Preliminary Report (15 Mar 2021 06:01):    No growth to date.    Culture - Urine (collected 13 Mar 2021 23:11)  Source: .Urine Clean Catch (Midstream)  Final Report (15 Mar 2021 08:14):    <10,000 CFU/mL Normal Urogenital Anastasiya        RADIOLOGY & ADDITIONAL TESTS:     < from: US Kidney and Bladder (03.15.21 @ 15:43) >  FINDINGS:    Right kidney: 10.8 cm. No renal mass, hydronephrosis or calculi.    Left kidney: 8.6 cm. No hydronephrosis or calculi. Upper pole angiomyolipoma measures 8 mm.    Urinary bladder: Within normal limits. Bilateral ureteral jets.    IMPRESSION:    *  No hydronephrosis.  *  Subcentimeter left renal angiomyolipoma.    Results Reviewed: Yes  Imaging Personally Reviewed:  Electrocardiogram Personally Reviewed:    COORDINATION OF CARE:  Care Discussed with Consultants/Other Providers [Y/N]:  Prior or Outpatient Records Reviewed [Y/N]:

## 2021-03-16 NOTE — CHART NOTE - NSCHARTNOTEFT_GEN_A_CORE
patient unable to give self insulin injections - spoke to patients son destiny  both he and his father are essential workers, with shifts of 3pm-11pm.  son does not live with parents.  will discuss with tala re possible options.  paged endo attending patient unable to give self insulin injections - spoke to patients son destiny  both he and his father are essential workers, with shifts of 3pm-11pm.  son does not live with parents.  daughter lives in [place].  will discuss with tala lopez possible options.  paged tala attending

## 2021-03-16 NOTE — CHART NOTE - NSCHARTNOTEFT_GEN_A_CORE
MRI and MRA reviewed with stroke neurology.  MRI notable for R. anterior choroidal distribution infarct with MRA notable asymptomatic M2 significant stenosis and PCA stenosis as well as 3.4mm from the proximal supraclinoid segment of the ICA.   Mechanism of stroke thus currently under ESUS (Embolic Stroke of Undetermined Source).   Agree with 30 day Holter or ILR which can be started inpatient preferably but can be done in expedited fashion outpatient.   Continue ASA 81 and Atorvastatin 80 as well as good BP and glucose control.   Echo reviewed and normal.     Impression: L. hemiparesis and minor VF deficit secondary to R. anterior choroidal territorial ischemic infarct due to embolic stroke of undetermined source    Patient should follow up with stroke team outpatient.   Please email [email or web address] with basic PHI to arrange follow up with stroke NP Tatyana Lux as soon as available upon discharge.  [address], [address] [place], [place]  [phone] MRI and MRA reviewed with stroke neurology.  MRI notable for R. anterior choroidal distribution infarct with MRA notable asymptomatic M2 significant stenosis and PCA stenosis as well as 3.4mm from the proximal supraclinoid segment of the ICA.   Mechanism of stroke thus currently under ESUS (Embolic Stroke of Undetermined Source).   Agree with 30 day Holter or ILR which can be started inpatient preferably but can be done in expedited fashion outpatient.   Continue ASA 81 and Atorvastatin 80 as well as good BP and glucose control.   Echo reviewed and normal.     Impression: L. hemiparesis and minor VF deficit secondary to R. anterior choroidal territorial ischemic infarct due to embolic stroke of undetermined source    Patient should follow up with stroke team outpatient.   Please email [email or web address] with basic PHI to arrange follow up with stroke NP Tatyana Lux as soon as available upon discharge.  [address], [address] [place], [place]  [phone]    Stroke attending. Agree with above

## 2021-03-16 NOTE — PROGRESS NOTE ADULT - SUBJECTIVE AND OBJECTIVE BOX
Chief Complaint:     History:    MEDICATIONS  (STANDING):  aspirin  chewable 81 milliGRAM(s) Oral daily  atorvastatin 80 milliGRAM(s) Oral at bedtime  dextrose 40% Gel 15 Gram(s) Oral once  dextrose 5%. 1000 milliLiter(s) (50 mL/Hr) IV Continuous <Continuous>  dextrose 5%. 1000 milliLiter(s) (100 mL/Hr) IV Continuous <Continuous>  dextrose 50% Injectable 25 Gram(s) IV Push once  dextrose 50% Injectable 12.5 Gram(s) IV Push once  dextrose 50% Injectable 25 Gram(s) IV Push once  fenofibrate Tablet 48 milliGRAM(s) Oral daily  glucagon  Injectable 1 milliGRAM(s) IntraMuscular once  heparin   Injectable 5000 Unit(s) SubCutaneous every 12 hours  insulin glargine Injectable (LANTUS) 10 Unit(s) SubCutaneous at bedtime  insulin lispro (ADMELOG) corrective regimen sliding scale   SubCutaneous three times a day before meals  insulin lispro (ADMELOG) corrective regimen sliding scale   SubCutaneous at bedtime  insulin lispro Injectable (ADMELOG) 1 Unit(s) SubCutaneous three times a day before meals  NIFEdipine XL 30 milliGRAM(s) Oral daily    MEDICATIONS  (PRN):      Allergies    No Known Allergies    Intolerances      Review of Systems:  Constitutional: No fever  Eyes: No blurry vision  Neuro: No tremors  HEENT: No pain  Cardiovascular: No chest pain, palpitations  Respiratory: No SOB, no cough  GI: No nausea, vomiting, abdominal pain  : No dysuria  Skin: no rash  Psych: no depression  Endocrine: no polyuria, polydipsia  Hem/lymph: no swelling  Osteoporosis: no fractures    ALL OTHER SYSTEMS REVIEWED AND NEGATIVE    UNABLE TO OBTAIN    PHYSICAL EXAM:  VITALS: T(C): 36.9 (03-16-21 @ 05:30)  T(F): 98.5 (03-16-21 @ 05:30), Max: 98.7 (03-15-21 @ 22:40)  HR: 91 (03-16-21 @ 05:30) (76 - 97)  BP: 180/84 (03-16-21 @ 05:30) (166/74 - 214/81)  RR:  (16 - 18)  SpO2:  (100% - 100%)  Wt(kg): --  GENERAL: NAD, well-groomed, well-developed  EYES: No proptosis, no lid lag, anicteric  HEENT:  Atraumatic, Normocephalic, moist mucous membranes  THYROID: Normal size, no palpable nodules  RESPIRATORY: Clear to auscultation bilaterally; No rales, rhonchi, wheezing, or rubs  CARDIOVASCULAR: Regular rate and rhythm; No murmurs; no peripheral edema  GI: Soft, nontender, non distended, normal bowel sounds  SKIN: Dry, intact, No rashes or lesions  MUSCULOSKELETAL: Full range of motion, normal strength  NEURO: sensation intact, extraocular movements intact, no tremor, normal reflexes  PSYCH: Alert and oriented x 3, normal affect, normal mood  CUSHING'S SIGNS: no striae    POCT Blood Glucose.: 301 mg/dL (03-16-21 @ 08:19)  POCT Blood Glucose.: 328 mg/dL (03-15-21 @ 22:41)  POCT Blood Glucose.: 234 mg/dL (03-15-21 @ 17:21)  POCT Blood Glucose.: 212 mg/dL (03-15-21 @ 12:15)  POCT Blood Glucose.: 147 mg/dL (03-15-21 @ 08:32)  POCT Blood Glucose.: 232 mg/dL (03-15-21 @ 01:52)  POCT Blood Glucose.: 200 mg/dL (03-14-21 @ 21:17)  POCT Blood Glucose.: 131 mg/dL (03-14-21 @ 19:33)  POCT Blood Glucose.: 210 mg/dL (03-14-21 @ 12:53)  POCT Blood Glucose.: 212 mg/dL (03-14-21 @ 06:15)  POCT Blood Glucose.: 202 mg/dL (03-14-21 @ 01:53)  POCT Blood Glucose.: 480 mg/dL (03-13-21 @ 20:49)      03-16    132<L>  |  100  |  28<H>  ----------------------------<  327<H>  4.4   |  22  |  1.98<H>    EGFR if : 29<L>  EGFR if non : 25<L>    Ca    8.5      03-16  Mg     2.0     03-16  Phos  4.2     03-16    TPro  6.5  /  Alb  3.3  /  TBili  <0.2  /  DBili  x   /  AST  17  /  ALT  16  /  AlkPhos  76  03-13          Thyroid Function Tests:  03-13 @ 23:00 TSH 2.54 FreeT4 -- T3 -- Anti TPO -- Anti Thyroglobulin Ab -- TSI --                           Chief Complaint: DM2    History: Patient's appetite is improved. Spoke to nurse to coordinate with  regarding insulin teaching.     MEDICATIONS  (STANDING):  aspirin  chewable 81 milliGRAM(s) Oral daily  atorvastatin 80 milliGRAM(s) Oral at bedtime  dextrose 40% Gel 15 Gram(s) Oral once  dextrose 5%. 1000 milliLiter(s) (50 mL/Hr) IV Continuous <Continuous>  dextrose 5%. 1000 milliLiter(s) (100 mL/Hr) IV Continuous <Continuous>  dextrose 50% Injectable 25 Gram(s) IV Push once  dextrose 50% Injectable 12.5 Gram(s) IV Push once  dextrose 50% Injectable 25 Gram(s) IV Push once  fenofibrate Tablet 48 milliGRAM(s) Oral daily  glucagon  Injectable 1 milliGRAM(s) IntraMuscular once  heparin   Injectable 5000 Unit(s) SubCutaneous every 12 hours  insulin glargine Injectable (LANTUS) 10 Unit(s) SubCutaneous at bedtime  insulin lispro (ADMELOG) corrective regimen sliding scale   SubCutaneous three times a day before meals  insulin lispro (ADMELOG) corrective regimen sliding scale   SubCutaneous at bedtime  insulin lispro Injectable (ADMELOG) 1 Unit(s) SubCutaneous three times a day before meals  NIFEdipine XL 30 milliGRAM(s) Oral daily    MEDICATIONS  (PRN):      Allergies    No Known Allergies    Intolerances      Review of Systems:  Constitutional: No fever  Eyes: No blurry vision  Neuro: No tremors  HEENT: No pain  Cardiovascular: No chest pain, palpitations  Respiratory: No SOB, no cough  GI: No nausea, vomiting, abdominal pain  : No dysuria  Skin: no rash  Psych: no depression  Endocrine: no polyuria, polydipsia      ALL OTHER SYSTEMS REVIEWED AND NEGATIVE        PHYSICAL EXAM:  VITALS: T(C): 36.9 (03-16-21 @ 05:30)  T(F): 98.5 (03-16-21 @ 05:30), Max: 98.7 (03-15-21 @ 22:40)  HR: 91 (03-16-21 @ 05:30) (76 - 97)  BP: 180/84 (03-16-21 @ 05:30) (166/74 - 214/81)  RR:  (16 - 18)  SpO2:  (100% - 100%)  Wt(kg): --  GENERAL: NAD, well-groomed, well-developed  EYES: No proptosis, no lid lag, anicteric  HEENT:  Atraumatic, Normocephalic, moist mucous membranes  RESPIRATORY: Clear to auscultation bilaterally; No rales, rhonchi, wheezing, or rubs  CARDIOVASCULAR: Regular rate and rhythm  GI: Soft, nontender, non distended, normal bowel sounds  SKIN: Dry, intact, No rashes or lesions  PSYCH: Alert and oriented x 3, normal affect, normal mood      POCT Blood Glucose.: 301 mg/dL (03-16-21 @ 08:19)  POCT Blood Glucose.: 328 mg/dL (03-15-21 @ 22:41)  POCT Blood Glucose.: 234 mg/dL (03-15-21 @ 17:21)  POCT Blood Glucose.: 212 mg/dL (03-15-21 @ 12:15)  POCT Blood Glucose.: 147 mg/dL (03-15-21 @ 08:32)  POCT Blood Glucose.: 232 mg/dL (03-15-21 @ 01:52)  POCT Blood Glucose.: 200 mg/dL (03-14-21 @ 21:17)  POCT Blood Glucose.: 131 mg/dL (03-14-21 @ 19:33)  POCT Blood Glucose.: 210 mg/dL (03-14-21 @ 12:53)  POCT Blood Glucose.: 212 mg/dL (03-14-21 @ 06:15)  POCT Blood Glucose.: 202 mg/dL (03-14-21 @ 01:53)  POCT Blood Glucose.: 480 mg/dL (03-13-21 @ 20:49)      03-16    132<L>  |  100  |  28<H>  ----------------------------<  327<H>  4.4   |  22  |  1.98<H>    EGFR if : 29<L>  EGFR if non : 25<L>    Ca    8.5      03-16  Mg     2.0     03-16  Phos  4.2     03-16    TPro  6.5  /  Alb  3.3  /  TBili  <0.2  /  DBili  x   /  AST  17  /  ALT  16  /  AlkPhos  76  03-13          Thyroid Function Tests:  03-13 @ 23:00 TSH 2.54 FreeT4 -- T3 -- Anti TPO -- Anti Thyroglobulin Ab -- TSI --

## 2021-03-17 LAB
ANION GAP SERPL CALC-SCNC: 9 MMOL/L — SIGNIFICANT CHANGE UP (ref 7–14)
BUN SERPL-MCNC: 32 MG/DL — HIGH (ref 7–23)
CALCIUM SERPL-MCNC: 8.3 MG/DL — LOW (ref 8.4–10.5)
CHLORIDE SERPL-SCNC: 102 MMOL/L — SIGNIFICANT CHANGE UP (ref 98–107)
CO2 SERPL-SCNC: 25 MMOL/L — SIGNIFICANT CHANGE UP (ref 22–31)
CREAT SERPL-MCNC: 2.21 MG/DL — HIGH (ref 0.5–1.3)
GLUCOSE BLDC GLUCOMTR-MCNC: 174 MG/DL — HIGH (ref 70–99)
GLUCOSE BLDC GLUCOMTR-MCNC: 192 MG/DL — HIGH (ref 70–99)
GLUCOSE BLDC GLUCOMTR-MCNC: 208 MG/DL — HIGH (ref 70–99)
GLUCOSE BLDC GLUCOMTR-MCNC: 232 MG/DL — HIGH (ref 70–99)
GLUCOSE SERPL-MCNC: 199 MG/DL — HIGH (ref 70–99)
HCT VFR BLD CALC: 32.3 % — LOW (ref 34.5–45)
HGB BLD-MCNC: 10.6 G/DL — LOW (ref 11.5–15.5)
MAGNESIUM SERPL-MCNC: 2.1 MG/DL — SIGNIFICANT CHANGE UP (ref 1.6–2.6)
MCHC RBC-ENTMCNC: 27.3 PG — SIGNIFICANT CHANGE UP (ref 27–34)
MCHC RBC-ENTMCNC: 32.8 GM/DL — SIGNIFICANT CHANGE UP (ref 32–36)
MCV RBC AUTO: 83.2 FL — SIGNIFICANT CHANGE UP (ref 80–100)
NRBC # BLD: 0 /100 WBCS — SIGNIFICANT CHANGE UP
NRBC # FLD: 0 K/UL — SIGNIFICANT CHANGE UP
PHOSPHATE SERPL-MCNC: 4.7 MG/DL — HIGH (ref 2.5–4.5)
PLATELET # BLD AUTO: 356 K/UL — SIGNIFICANT CHANGE UP (ref 150–400)
POTASSIUM SERPL-MCNC: 4.2 MMOL/L — SIGNIFICANT CHANGE UP (ref 3.5–5.3)
POTASSIUM SERPL-SCNC: 4.2 MMOL/L — SIGNIFICANT CHANGE UP (ref 3.5–5.3)
RBC # BLD: 3.88 M/UL — SIGNIFICANT CHANGE UP (ref 3.8–5.2)
RBC # FLD: 12.6 % — SIGNIFICANT CHANGE UP (ref 10.3–14.5)
SODIUM SERPL-SCNC: 136 MMOL/L — SIGNIFICANT CHANGE UP (ref 135–145)
WBC # BLD: 10.02 K/UL — SIGNIFICANT CHANGE UP (ref 3.8–10.5)
WBC # FLD AUTO: 10.02 K/UL — SIGNIFICANT CHANGE UP (ref 3.8–10.5)

## 2021-03-17 PROCEDURE — 99233 SBSQ HOSP IP/OBS HIGH 50: CPT

## 2021-03-17 PROCEDURE — 99222 1ST HOSP IP/OBS MODERATE 55: CPT

## 2021-03-17 PROCEDURE — 99232 SBSQ HOSP IP/OBS MODERATE 35: CPT

## 2021-03-17 RX ORDER — INSULIN LISPRO 100/ML
7 VIAL (ML) SUBCUTANEOUS
Refills: 0 | Status: DISCONTINUED | OUTPATIENT
Start: 2021-03-17 | End: 2021-03-19

## 2021-03-17 RX ORDER — INSULIN GLARGINE 100 [IU]/ML
15 INJECTION, SOLUTION SUBCUTANEOUS AT BEDTIME
Refills: 0 | Status: DISCONTINUED | OUTPATIENT
Start: 2021-03-17 | End: 2021-03-17

## 2021-03-17 RX ORDER — NIFEDIPINE 30 MG
30 TABLET, EXTENDED RELEASE 24 HR ORAL ONCE
Refills: 0 | Status: COMPLETED | OUTPATIENT
Start: 2021-03-17 | End: 2021-03-17

## 2021-03-17 RX ORDER — INSULIN GLARGINE 100 [IU]/ML
15 INJECTION, SOLUTION SUBCUTANEOUS EVERY MORNING
Refills: 0 | Status: DISCONTINUED | OUTPATIENT
Start: 2021-03-17 | End: 2021-03-18

## 2021-03-17 RX ORDER — HUMAN INSULIN 100 [IU]/ML
7 INJECTION, SUSPENSION SUBCUTANEOUS ONCE
Refills: 0 | Status: COMPLETED | OUTPATIENT
Start: 2021-03-17 | End: 2021-03-17

## 2021-03-17 RX ORDER — NIFEDIPINE 30 MG
90 TABLET, EXTENDED RELEASE 24 HR ORAL DAILY
Refills: 0 | Status: DISCONTINUED | OUTPATIENT
Start: 2021-03-18 | End: 2021-03-19

## 2021-03-17 RX ADMIN — Medication 30 MILLIGRAM(S): at 18:02

## 2021-03-17 RX ADMIN — Medication 60 MILLIGRAM(S): at 06:47

## 2021-03-17 RX ADMIN — Medication 2: at 08:52

## 2021-03-17 RX ADMIN — Medication 7 UNIT(S): at 13:15

## 2021-03-17 RX ADMIN — ATORVASTATIN CALCIUM 80 MILLIGRAM(S): 80 TABLET, FILM COATED ORAL at 21:39

## 2021-03-17 RX ADMIN — Medication 4 UNIT(S): at 08:52

## 2021-03-17 RX ADMIN — HUMAN INSULIN 7 UNIT(S): 100 INJECTION, SUSPENSION SUBCUTANEOUS at 21:40

## 2021-03-17 RX ADMIN — Medication 1: at 13:14

## 2021-03-17 RX ADMIN — Medication 7 UNIT(S): at 18:02

## 2021-03-17 RX ADMIN — Medication 81 MILLIGRAM(S): at 08:53

## 2021-03-17 RX ADMIN — Medication 2: at 18:02

## 2021-03-17 RX ADMIN — HEPARIN SODIUM 5000 UNIT(S): 5000 INJECTION INTRAVENOUS; SUBCUTANEOUS at 18:02

## 2021-03-17 RX ADMIN — HEPARIN SODIUM 5000 UNIT(S): 5000 INJECTION INTRAVENOUS; SUBCUTANEOUS at 06:46

## 2021-03-17 RX ADMIN — Medication 3 MILLIGRAM(S): at 22:12

## 2021-03-17 RX ADMIN — Medication 48 MILLIGRAM(S): at 08:53

## 2021-03-17 RX ADMIN — Medication 3 MILLIGRAM(S): at 00:07

## 2021-03-17 NOTE — DIETITIAN INITIAL EVALUATION ADULT. - PERTINENT LABORATORY DATA
03-17 Na 136 mmol/L Glu 199 mg/dL<H> K+ 4.2 mmol/L Cr 2.21 mg/dL<H> BUN 32 mg/dL<H> Phos 4.7 mg/dL<H>  03-17 @ 08:25 POCT 208 mg/dL  03-16 @ 22:38 POCT 261 mg/dL  03-16 @ 20:57 POCT 258 mg/dL  03-16 @ 17:13 POCT 254 mg/dL  03-16 @ 12:30 POCT 267 mg/dL

## 2021-03-17 NOTE — PROVIDER CONTACT NOTE (OTHER) - NAME OF MD/NP/PA/DO NOTIFIED:
ACP Jennifer
Abelardo CHERRY
[facility] [place] [id number]
ACP Jennifer
JO ANN Pereira
[place] Cooper, ACP
[place] Cooper, ACP
ACP Jennifer
ACP Jennifer
JO ANN Vasquez

## 2021-03-17 NOTE — PROGRESS NOTE ADULT - SUBJECTIVE AND OBJECTIVE BOX
Contact info:   Jorge Luis Quintero MD  pager [phone], please provide 10 digit call back number.   Please note that this patient may be followed by another provider tomorrow.   If no answer or after hours, please contact [phone]    Interval History/Subjective:  Patient is doing well.  Lying in bed.  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  Unable to administer insulin at night time and dinner time.  No hypoglycemia.  Glucose trending in 200s.        MEDICATIONS  (STANDING):  aspirin  chewable 81 milliGRAM(s) Oral daily  atorvastatin 80 milliGRAM(s) Oral at bedtime  dextrose 40% Gel 15 Gram(s) Oral once  dextrose 5%. 1000 milliLiter(s) (50 mL/Hr) IV Continuous <Continuous>  dextrose 5%. 1000 milliLiter(s) (100 mL/Hr) IV Continuous <Continuous>  dextrose 50% Injectable 25 Gram(s) IV Push once  dextrose 50% Injectable 12.5 Gram(s) IV Push once  dextrose 50% Injectable 25 Gram(s) IV Push once  fenofibrate Tablet 48 milliGRAM(s) Oral daily  glucagon  Injectable 1 milliGRAM(s) IntraMuscular once  heparin   Injectable 5000 Unit(s) SubCutaneous every 12 hours  insulin glargine Injectable (LANTUS) 12 Unit(s) SubCutaneous every morning  insulin lispro (ADMELOG) corrective regimen sliding scale   SubCutaneous three times a day before meals  insulin lispro (ADMELOG) corrective regimen sliding scale   SubCutaneous at bedtime  insulin lispro Injectable (ADMELOG) 4 Unit(s) SubCutaneous three times a day before meals  insulin NPH human recombinant 7 Unit(s) SubCutaneous once  melatonin 3 milliGRAM(s) Oral at bedtime  NIFEdipine XL 60 milliGRAM(s) Oral daily    MEDICATIONS  (PRN):      Allergies    No Known Allergies    Intolerances      Review of Systems:  Constitutional: No fever  Eyes: No blurry vision  Neuro: No tremors  HEENT: No pain  Cardiovascular: No chest pain, palpitations  Respiratory: No SOB, no cough  GI: No nausea, vomiting, abdominal pain  : No dysuria  Skin: no rash  Psych: no depression  Endocrine: no polyuria, polydipsia  Hem/lymph: no swelling    ALL OTHER SYSTEMS REVIEWED AND NEGATIVE    PHYSICAL EXAM:  VITALS: T(C): 36.3 (03-17-21 @ 06:40)  T(F): 97.3 (03-17-21 @ 06:40), Max: 99.2 (03-16-21 @ 22:35)  HR: 76 (03-17-21 @ 06:40) (76 - 94)  BP: 169/75 (03-17-21 @ 06:40) (157/89 - 170/93)  RR:  (16 - 17)  SpO2:  (98% - 100%)  Wt(kg): --  GENERAL: NAD, well-groomed, well-developed  EYES: No proptosis, no lid lag, anicteric  HEENT:  Atraumatic, Normocephalic, moist mucous membranes  THYROID: Normal size, no palpable nodules  GI: Soft, nontender, non distended, normal bowel sounds  SKIN: Dry, intact, No rashes or lesions  PSYCH: Alert and oriented x 3, normal affect, normal mood  CUSHING'S SIGNS: no striae    POCT Blood Glucose.: 208 mg/dL (03-17-21 @ 08:25)  POCT Blood Glucose.: 261 mg/dL (03-16-21 @ 22:38)  POCT Blood Glucose.: 258 mg/dL (03-16-21 @ 20:57)  POCT Blood Glucose.: 254 mg/dL (03-16-21 @ 17:13)  POCT Blood Glucose.: 267 mg/dL (03-16-21 @ 12:30)  POCT Blood Glucose.: 301 mg/dL (03-16-21 @ 08:19)  POCT Blood Glucose.: 328 mg/dL (03-15-21 @ 22:41)  POCT Blood Glucose.: 234 mg/dL (03-15-21 @ 17:21)  POCT Blood Glucose.: 212 mg/dL (03-15-21 @ 12:15)  POCT Blood Glucose.: 147 mg/dL (03-15-21 @ 08:32)  POCT Blood Glucose.: 232 mg/dL (03-15-21 @ 01:52)  POCT Blood Glucose.: 200 mg/dL (03-14-21 @ 21:17)  POCT Blood Glucose.: 131 mg/dL (03-14-21 @ 19:33)  POCT Blood Glucose.: 210 mg/dL (03-14-21 @ 12:53)      03-17    136  |  102  |  32<H>  ----------------------------<  199<H>  4.2   |  25  |  2.21<H>    EGFR if : 26<L>  EGFR if non : 22<L>    Ca    8.3<L>      03-17  Mg     2.1     03-17  Phos  4.7     03-17          Thyroid Function Tests:  03-13 @ 23:00 TSH 2.54 FreeT4 -- T3 -- Anti TPO -- Anti Thyroglobulin Ab -- TSI --

## 2021-03-17 NOTE — PROGRESS NOTE ADULT - ASSESSMENT
71 y/o woman with pmhx of CAD s/p stent 6 years ago, Dm2, ?CKD, HTN who presents to the ER with 1-2 days of generalized weakness with fall at home and slurred speech admitted for stroke and found to have clinical and radiographic data supporting the diagnosis of a cerebrovascular accident CVA.     Neuro Recs:    Patient cleared from a neurovascular standpoint for discharge  Pending ILR  Continue aspirin 81mg QD  Atorvastatin 80mg QD with LDL goal <70  F/u with Tatyana Lux. Information listed below     Patient should follow up with stroke team outpatient.   Please email [email or web address] with basic PHI to arrange follow up with stroke NP Tatyana Lux as soon as available upon discharge.  [address], [address] [place], [place]  [phone] 71 y/o woman with pmhx of CAD s/p stent 6 years ago, Dm2, ?CKD, HTN who presents to the ER with 1-2 days of generalized weakness with fall at home and slurred speech admitted for stroke and found to have clinical and radiographic data supporting the diagnosis of an acute right anterior choroidal infarction mechanism likely secondary to embolic stroke of undetermined source.      Neuro Recs:    Patient cleared from a neurovascular standpoint for discharge  Pending ILR  Continue aspirin 81mg QD  Atorvastatin 80mg QD with LDL goal <70  F/u with Tatyana Lux. Information listed below     Patient should follow up with stroke team outpatient.   Please email [email or web address] with basic PHI to arrange follow up with stroke NP Tatyana Lux as soon as available upon discharge.  [address], [address] [place], [place]  [phone]

## 2021-03-17 NOTE — PROVIDER CONTACT NOTE (OTHER) - ACTION/TREATMENT ORDERED:
Continue to monitor VSQ6
JO ANN Rodriguez made aware. No BP meds for 24 hrs of admission. Continue to monitor patient.
Give Nifidipine. Recheck BP in one hour
Continue to monitor VSQ6
no actions ordered. will continue to monitor
recheck BP in 45 minutes to an hour
will continue to monitor vitals and on tele
JO ANN Rodriguez said BP is okay since first dose of nifedipine was given recently.
JO ANN Vasquez made aware. 24 h permissive HTN. BP is OK right now. Continue to monitor
Bladder scan upon arrival to floor.

## 2021-03-17 NOTE — CONSULT NOTE ADULT - REASON FOR ADMISSION
generalized weakness, cva

## 2021-03-17 NOTE — DIETITIAN INITIAL EVALUATION ADULT. - OTHER INFO
Pt reports good PO intake with >75% completion of meals. Denies any GI issues (nausea/vomiting/diarrhea/constipation.) Denies any chewing or swallowing difficulties at this time. Pt is on mechanical soft diet per swallow recommendations from 3/15. NKFA.  pounds (last weighed 3 months ago). Pt was weighed on bed by me 3/17 at 41 kg/ 90 pounds. Scale error likely, but patient has noticed her clothes are feeling slightly looser.   Pt states she was diagnosed with DM about 1 year ago. Pt with little teach back on which foods will raise her BG. Discussed with patient current A1c (14%), goal A1c, goal fingersticks. Provided pt with handout Type 2 Diabetes Nutrition Therapy. Discussed carbohydrate sources, carbohydrate portions, protein sources, mixed meals, and nutrition label reading.

## 2021-03-17 NOTE — CONSULT NOTE ADULT - ATTENDING COMMENTS
Bess Greenfield (pager [phone])  On evenings and weekends, please call [phone] or page endocrine fellow on call.   Please note that this patient may be followed by different provider tomorrow. If no answer, contact endocrine fellow on call.
69 y/o woman with hx of DMII, CKD, HTN, p/w weakness and fall at home 1 day prior to admission. As per patient she woke on Saturday morning with symptoms, feeling unwell and dizzy, then fell in the kitchen. She lives with her , but was last seen normal at 10pm the night prior. On exam pt is awake and alert, she notably lays mostly on her right side, she is oriented to person, place, year, speech fluent, follows commands, She has visual neglect to the left, gaze deviation to the right, can tract to the midline but not to the left, even with oculocephalic maneuver. Left visual field deficit. Left facial droop, moderate dysarthria, Right arm, and leg are full strength, no drift, the left arm and leg both drift down, 4/5 in strength. She has sensory extinction.   Findings concerning for a right hemispheric infarct.     < from: MR Head No Cont (03.14.21 @ 12:37) >  Abnormal T2 prolongation with restricted diffusion is seen involving the posterior limb of the right internal capsule as well as the right periatrial and right corona radiata region. These findings are compatible areas of acute infarcts. No hemorrhagic transformation is seen. 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 CTA of the [place] can be done for further evaluation.  Short segment of stenosis seen involving the left PCA and right MCA arteries as described above.    MRI shows acute infarct of the right posterior limb of the internal capsule, periatrial and corona radiata region, and right medial temporal lobe.   There is short segment stenosis of the left PCA and right MCA possibly related to large vessel atherosclerotic disease. These would not explain the acute infarcts. The territory of the acute infarct is concerning for possible embolism of the right anterior choroidal artery, the size is on the large side for a small vessel infarct although she has significant risk factors.    Would pursue workup for embolism of unknown origin.  Telemetry monitoring, will likely need long term monitoring with halter or LOOP.  Transthoracic Echo  LDL Cholesterol Calculated: 150 mg/dL (03.14.21 @ 06:23)  A1C with Estimated Average Glucose Result: 14.0  Continue with Aspirin 81mg daily.   Pt's LDL and A1c are way above goal. Goal LDL <70.   PT, OT, speech and swallow.   dysphagia screen  DVT ppx
This is a 70 year old woman with pmhx of CAD s/p stent 6 years ago, DMT2, ?CKD, HTN who presents to the ER with 1-2 days of generalized weakness with fall at home and slurred speech found to have acute CVA and admitted for stroke. TTE revealed TTE no LVH, normal left ventricular systolic function, no segmental wall motion abnormalities, and normal right ventricular size and function. EP was consulted for ILR. Per CRYSTAL-AF, patient will benefit from prolonged monitoring for detection of AF/PAF as cause of potential cardioembolic source. As per CRYSTAL AF patients with cryptogenic stroke benefit from ILR to identify AF either as the etiology of CVA or a epiphenomenon of the CVA. Either way, AF would  (AC as opposed to antiplatelet).

## 2021-03-17 NOTE — DIETITIAN INITIAL EVALUATION ADULT. - PERTINENT MEDS FT
MEDICATIONS  (STANDING):  aspirin  chewable 81 milliGRAM(s) Oral daily  atorvastatin 80 milliGRAM(s) Oral at bedtime  dextrose 40% Gel 15 Gram(s) Oral once  dextrose 5%. 1000 milliLiter(s) (50 mL/Hr) IV Continuous <Continuous>  dextrose 5%. 1000 milliLiter(s) (100 mL/Hr) IV Continuous <Continuous>  dextrose 50% Injectable 25 Gram(s) IV Push once  dextrose 50% Injectable 12.5 Gram(s) IV Push once  dextrose 50% Injectable 25 Gram(s) IV Push once  fenofibrate Tablet 48 milliGRAM(s) Oral daily  glucagon  Injectable 1 milliGRAM(s) IntraMuscular once  heparin   Injectable 5000 Unit(s) SubCutaneous every 12 hours  insulin glargine Injectable (LANTUS) 15 Unit(s) SubCutaneous every morning  insulin lispro (ADMELOG) corrective regimen sliding scale   SubCutaneous three times a day before meals  insulin lispro (ADMELOG) corrective regimen sliding scale   SubCutaneous at bedtime  insulin lispro Injectable (ADMELOG) 7 Unit(s) SubCutaneous three times a day before meals  insulin NPH human recombinant 7 Unit(s) SubCutaneous once  melatonin 3 milliGRAM(s) Oral at bedtime  NIFEdipine XL 60 milliGRAM(s) Oral daily

## 2021-03-17 NOTE — PROGRESS NOTE ADULT - ASSESSMENT
70 yr old F with PMH of CAD s/p PCI, DM2 uncontrolled A1C 13.9%, ?CKD here with acute CVA. High risk patient with high level decision making.     Type 2 diabetes mellitus with hyperglycemia, without long-term current use of insulin.   While inpatient CHO diet and FS AC and HS  Goal Glucose 100-180  Please check Islet cell and JOJO Ab to r/o BO as well as C-peptide  Will transition insulin to morning does, tonight will give NPH 7 units at bedtime  Start Lantus 15 units in the morning tomorrow.  Order placed by me.   Recommend to increase Admelog from 4 units to 7 units tid with meals.     Recommend basal/bolus regimen on discharge with discontinuation of orals  -Long acting insulin once daily in the morning (does to be determiend)  -Admelog with breakfast and lunch (to be administer by son/ while they are at home, would give Prandin 2-4 mg with dinner when patient is alone.   -Awaiting workup for antibodies    Patient is requesting SW consult for visiting nurse  Patient's family will require insulin pen teaching  Nutrition consult  Pt can f/u at [address] [place] [address] [place] [phone].  Hypertension, unspecified type.    Patient with recent CVA event  BP management as per primary team.     Hyperlipidemia  LDL goal less than 70mg/dl  Recommend to continue with statin therapy.

## 2021-03-17 NOTE — PROVIDER CONTACT NOTE (OTHER) - BACKGROUND
Admitted as code stroke for weakness, facial droop, fall.
(Admit Diagnosis) Dizziness and giddiness  (PMH) Hypertension  (PMH) CAD (coronary artery disease)  (PMH) DM (diabetes mellitus)
Patient admitted for dizziness s/p fall and permissible 24 hypertension. Nifedipine started yesterday
Patient admitted to unit for dizziness s/p fall
patient admitted for left sided facial droop, rule out stroke. permissive HTN up to 220/110
patient admitted for left sided weakness, rule out stroke
PAtient admitted for dizziness, 24 hr permissible hypertension
Patient admitted for dizziness.
Patient admitted for dizziness with 24 hr permissive hypertension. Acute CVA

## 2021-03-17 NOTE — PROVIDER CONTACT NOTE (OTHER) - SITUATION
Hypertension
Hypertension
Hypertension /81 HR 80
Patient experiencing urinary frequency and possible urinary and bowel incontinence.
Hypertension
Hypertension
Repeat blood pressure 189/86
SBP: 180
patient's blood pressure is 170/72 HR 78
/108

## 2021-03-17 NOTE — PROGRESS NOTE ADULT - SUBJECTIVE AND OBJECTIVE BOX
Sherry Mims  [facility]  Pager #[phone]    Patient is a 70y old  Female who presents with a chief complaint of generalized weakness, cva (17 Mar 2021 11:54)      SUBJECTIVE / OVERNIGHT EVENTS: Patient seen and examined at bedside. Patient feeling well, has no complaints.    ADDITIONAL REVIEW OF SYSTEMS:    MEDICATIONS  (STANDING):  aspirin  chewable 81 milliGRAM(s) Oral daily  atorvastatin 80 milliGRAM(s) Oral at bedtime  dextrose 40% Gel 15 Gram(s) Oral once  dextrose 5%. 1000 milliLiter(s) (50 mL/Hr) IV Continuous <Continuous>  dextrose 5%. 1000 milliLiter(s) (100 mL/Hr) IV Continuous <Continuous>  dextrose 50% Injectable 25 Gram(s) IV Push once  dextrose 50% Injectable 12.5 Gram(s) IV Push once  dextrose 50% Injectable 25 Gram(s) IV Push once  fenofibrate Tablet 48 milliGRAM(s) Oral daily  glucagon  Injectable 1 milliGRAM(s) IntraMuscular once  heparin   Injectable 5000 Unit(s) SubCutaneous every 12 hours  insulin glargine Injectable (LANTUS) 15 Unit(s) SubCutaneous every morning  insulin lispro (ADMELOG) corrective regimen sliding scale   SubCutaneous three times a day before meals  insulin lispro (ADMELOG) corrective regimen sliding scale   SubCutaneous at bedtime  insulin lispro Injectable (ADMELOG) 7 Unit(s) SubCutaneous three times a day before meals  insulin NPH human recombinant 7 Unit(s) SubCutaneous once  melatonin 3 milliGRAM(s) Oral at bedtime  NIFEdipine XL 60 milliGRAM(s) Oral daily    MEDICATIONS  (PRN):      CAPILLARY BLOOD GLUCOSE      POCT Blood Glucose.: 174 mg/dL (17 Mar 2021 12:24)  POCT Blood Glucose.: 208 mg/dL (17 Mar 2021 08:25)  POCT Blood Glucose.: 261 mg/dL (16 Mar 2021 22:38)  POCT Blood Glucose.: 258 mg/dL (16 Mar 2021 20:57)  POCT Blood Glucose.: 254 mg/dL (16 Mar 2021 17:13)    I&O's Summary    16 Mar 2021 07:01  -  17 Mar 2021 07:00  --------------------------------------------------------  IN: 240 mL / OUT: 600 mL / NET: -360 mL        PHYSICAL EXAM:    Vital Signs Last 24 Hrs  T(C): 36.1 (17 Mar 2021 11:59), Max: 37.3 (16 Mar 2021 22:35)  T(F): 97 (17 Mar 2021 11:59), Max: 99.2 (16 Mar 2021 22:35)  HR: 78 (17 Mar 2021 11:59) (76 - 93)  BP: 170/72 (17 Mar 2021 11:59) (169/70 - 170/93)  BP(mean): --  RR: 16 (17 Mar 2021 11:59) (16 - 17)  SpO2: 100% (17 Mar 2021 11:59) (98% - 100%)    CONSTITUTIONAL: NAD, well-developed, well-groomed  EYES: Conjunctiva and sclera clear  ENMT: Moist oral mucosa  RESPIRATORY: Normal respiratory effort; lungs are clear to auscultation bilaterally  CARDIOVASCULAR: Regular rate and rhythm, normal S1 and S2, no murmur/rub/gallop; No lower extremity edema  ABDOMEN: Nontender to palpation, normoactive bowel sounds  MUSCULOSKELETAL: No clubbing or cyanosis of digits  PSYCH: A+O to person, place, and time; affect appropriate  NEUROLOGY: 4/5 strength LUE, 5/5 strength other extremities  SKIN: No rashes; no palpable lesions    LABS:                        10.6   10.02 )-----------( 356      ( 17 Mar 2021 06:58 )             32.3     03-17    136  |  102  |  32<H>  ----------------------------<  199<H>  4.2   |  25  |  2.21<H>    Ca    8.3<L>      17 Mar 2021 06:58  Phos  4.7     03-17  Mg     2.1     03-17      RADIOLOGY & ADDITIONAL TESTS: No new imaging  Results Reviewed: Yes  Imaging Personally Reviewed:  Electrocardiogram Personally Reviewed:    COORDINATION OF CARE:  Care Discussed with Consultants/Other Providers [Y/N]:  Prior or Outpatient Records Reviewed [Y/N]:

## 2021-03-17 NOTE — CONSULT NOTE ADULT - SUBJECTIVE AND OBJECTIVE BOX
Source: patient and Chart    HPI:  This is a 70 year old woman with pmhx of CAD s/p stent 6 years ago, Dm2, ?CKD, HTN who presents to the ER with 1-2 days of generalized weakness. Patient reported she woke up this morning  with generalized weakness. Patient denied fever, chills, diaphoresis, HA, lightheadedness, dizziness, changes in visions, cold like symptoms  (sore throat cough, conjunctivitis runny nose), CP, palpitation, SOB, abdominal pain, n/v/d, hematuria, melena, hematochezia numbness and tingling. According to the chart, patient son stated that she fell recently and was slightly confused but with no LOC or head strike. She was found to have slurred speech at some point as well. She denies any headache or vision changes. Last normal per son around 10 PM friday. in the ER pt was given asa, ceftriaxone and 2L NS.     imaging revealed acute cva involving R internal capsule, R corona radiata.         ED course was significant for T=  BP___ RR___spo2 __% on RA. Labs were significant for WB __, Hgb __, HCT __, Na __, K __, sCr __, TSH __,  FT4 __, and Troponin 1 negative x2.  CXR revealed __. and EKG.    Upon my evaluation, patient was in SR on telemetric with no events. Patient denied HA, lightheadedness, dizziness, changes in visions, cold like symptoms, CP, palpitation, SOB, abdominal pain, and n/v/d.     PAST MEDICAL & SURGICAL HISTORY:  Hypertension    DM (diabetes mellitus)    CAD (coronary artery disease)    No significant past surgical history          MEDICATIONS  (STANDING):  aspirin  chewable 81 milliGRAM(s) Oral daily  atorvastatin 80 milliGRAM(s) Oral at bedtime  dextrose 40% Gel 15 Gram(s) Oral once  dextrose 5%. 1000 milliLiter(s) (50 mL/Hr) IV Continuous <Continuous>  dextrose 5%. 1000 milliLiter(s) (100 mL/Hr) IV Continuous <Continuous>  dextrose 50% Injectable 25 Gram(s) IV Push once  dextrose 50% Injectable 12.5 Gram(s) IV Push once  dextrose 50% Injectable 25 Gram(s) IV Push once  fenofibrate Tablet 48 milliGRAM(s) Oral daily  glucagon  Injectable 1 milliGRAM(s) IntraMuscular once  heparin   Injectable 5000 Unit(s) SubCutaneous every 12 hours  insulin glargine Injectable (LANTUS) 15 Unit(s) SubCutaneous every morning  insulin lispro (ADMELOG) corrective regimen sliding scale   SubCutaneous three times a day before meals  insulin lispro (ADMELOG) corrective regimen sliding scale   SubCutaneous at bedtime  insulin lispro Injectable (ADMELOG) 7 Unit(s) SubCutaneous three times a day before meals  insulin NPH human recombinant 7 Unit(s) SubCutaneous once  melatonin 3 milliGRAM(s) Oral at bedtime  NIFEdipine XL 60 milliGRAM(s) Oral daily    MEDICATIONS  (PRN):      FAMILY HISTORY:  No pertinent family history in first degree relatives        SOCIAL HISTORY:    LIVING SITUATION:  CIGARETTES: Denied  ALCOHOL: denied   ILLICIT DRUG USES: denied    REVIEW OF SYSTEMS:  CONSTITUTIONAL: No fever, weight loss, chills, shakes, or fatigue  EYES: No eye pain, visual disturbances, or discharge  ENMT:  No difficulty hearing, tinnitus, vertigo; No sinus or throat pain  NECK: No pain or stiffness  RESPIRATORY: No cough, wheezing, hemoptysis, or shortness of breath  CARDIOVASCULAR: No chest pain, dyspnea, palpitations, dizziness, syncope, paroxysmal nocturnal dyspnea, orthopnea, or arm or leg swelling  GASTROINTESTINAL: No abdominal  or epigastric pain, nausea, vomiting, hematemesis, diarrhea, constipation, melena or bright red blood.  GENITOURINARY: No dysuria, nocturia, hematuria, or urinary incontinence  NEUROLOGICAL: No headaches, memory loss, slurred speech, limb weakness, loss of strength, numbness, or tremors  MUSCULOSKELETAL: No joint pain or swelling, muscle, back, or extremity pain  PSYCHIATRIC: No depression, anxiety, or difficulty sleeping        Vital Signs Last 24 Hrs  T(C): 36.3 (17 Mar 2021 06:40), Max: 37.3 (16 Mar 2021 22:35)  T(F): 97.3 (17 Mar 2021 06:40), Max: 99.2 (16 Mar 2021 22:35)  HR: 76 (17 Mar 2021 06:40) (76 - 94)  BP: 169/75 (17 Mar 2021 06:40) (157/89 - 170/93)  BP(mean): --  RR: 16 (17 Mar 2021 06:40) (16 - 17)  SpO2: 100% (17 Mar 2021 06:40) (98% - 100%)    PHYSICAL EXAM:  GENERAL: Well appearing, speaking in full sentence, in NAD  HEAD:  Atraumatic, Normocephalic  EYES: EOMI, PERRLA, conjunctiva and sclera clear  ENMT: No tonsillar erythema, exudates, or enlargement; Moist mucous membranes, Good dentition, No lesions  NECK: Supple and normal thyroid.  No JVD or carotid bruit.  Carotid pulse is 2+ bilaterally.  HEART: S1S2 RRR; No murmurs, rubs, or gallops appreciated .  PULMONARY:CTABL, normal respiratory effort.  No rales, wheezing, or rhonchi appreciated bilaterally  ABDOMEN: Bowel sounds present, soft, NDNT  EXTREMITIES:  Warm, well -perfused, no pedal edema, distal pulses present  NEUROLOGICAL:AOx3 ,  motor function grossly  intact    INTERPRETATION OF TELEMETRY:    ECG:    I&O's Detail    16 Mar 2021 07:01  -  17 Mar 2021 07:00  --------------------------------------------------------  IN:    Oral Fluid: 240 mL  Total IN: 240 mL    OUT:    Voided (mL): 600 mL  Total OUT: 600 mL    Total NET: -360 mL          LABS:                        10.6   10.02 )-----------( 356      ( 17 Mar 2021 06:58 )             32.3     03-17    136  |  102  |  32<H>  ----------------------------<  199<H>  4.2   |  25  |  2.21<H>    Ca    8.3<L>      17 Mar 2021 06:58  Phos  4.7     03-17  Mg     2.1     03-17              BNP  I&O's Detail    16 Mar 2021 07:01  -  17 Mar 2021 07:00  --------------------------------------------------------  IN:    Oral Fluid: 240 mL  Total IN: 240 mL    OUT:    Voided (mL): 600 mL  Total OUT: 600 mL    Total NET: -360 mL        Daily     Daily     RADIOLOGY & ADDITIONAL STUDIES:        ASSESSMENT:        RECOMMENDATIONS:    Patient to be staffed with attending. Please await attending addendum Source: patient and Chart    HPI:  This is a 70 year old woman with pmhx of CAD s/p stent 6 years ago, Dm2, ?CKD, HTN who presents to the ER with 1-2 days of generalized weakness. Patient reported she woke up this morning  with generalized weakness. Patient denied fever, chills, diaphoresis, HA, lightheadedness, dizziness, changes in visions, cold like symptoms  (sore throat cough, conjunctivitis runny nose), CP, palpitation, SOB, abdominal pain, n/v/d, hematuria, melena, hematochezia numbness and tingling. According to the chart, patient son stated that she fell and was slightly confused but with no LOC or head strike. She was found to have slurred speech. She denies any headache or vision changes. Last normal per son around 10 PM friday. in the ER pt was given asa, ceftriaxone and 2L NS.     imaging revealed acute cva involving R internal capsule, R corona radiata.         ED course was significant for T=  BP___ RR___spo2 __% on RA. Labs were significant for WB __, Hgb __, HCT __, Na __, K __, sCr __, TSH __,  FT4 __, and Troponin 1 negative x2.  CXR revealed __. and EKG.    Upon my evaluation, patient was in SR on telemetric with no events. Patient denied HA, lightheadedness, dizziness, changes in visions, cold like symptoms, CP, palpitation, SOB, abdominal pain, and n/v/d.     PAST MEDICAL & SURGICAL HISTORY:  Hypertension    DM (diabetes mellitus)    CAD (coronary artery disease)    No significant past surgical history          MEDICATIONS  (STANDING):  aspirin  chewable 81 milliGRAM(s) Oral daily  atorvastatin 80 milliGRAM(s) Oral at bedtime  dextrose 40% Gel 15 Gram(s) Oral once  dextrose 5%. 1000 milliLiter(s) (50 mL/Hr) IV Continuous <Continuous>  dextrose 5%. 1000 milliLiter(s) (100 mL/Hr) IV Continuous <Continuous>  dextrose 50% Injectable 25 Gram(s) IV Push once  dextrose 50% Injectable 12.5 Gram(s) IV Push once  dextrose 50% Injectable 25 Gram(s) IV Push once  fenofibrate Tablet 48 milliGRAM(s) Oral daily  glucagon  Injectable 1 milliGRAM(s) IntraMuscular once  heparin   Injectable 5000 Unit(s) SubCutaneous every 12 hours  insulin glargine Injectable (LANTUS) 15 Unit(s) SubCutaneous every morning  insulin lispro (ADMELOG) corrective regimen sliding scale   SubCutaneous three times a day before meals  insulin lispro (ADMELOG) corrective regimen sliding scale   SubCutaneous at bedtime  insulin lispro Injectable (ADMELOG) 7 Unit(s) SubCutaneous three times a day before meals  insulin NPH human recombinant 7 Unit(s) SubCutaneous once  melatonin 3 milliGRAM(s) Oral at bedtime  NIFEdipine XL 60 milliGRAM(s) Oral daily    MEDICATIONS  (PRN):      FAMILY HISTORY:  No pertinent family history in first degree relatives        SOCIAL HISTORY:    LIVING SITUATION:  CIGARETTES: Denied  ALCOHOL: denied   ILLICIT DRUG USES: denied    REVIEW OF SYSTEMS:  CONSTITUTIONAL: No fever, weight loss, chills, shakes, or fatigue  EYES: No eye pain, visual disturbances, or discharge  ENMT:  No difficulty hearing, tinnitus, vertigo; No sinus or throat pain  NECK: No pain or stiffness  RESPIRATORY: No cough, wheezing, hemoptysis, or shortness of breath  CARDIOVASCULAR: No chest pain, dyspnea, palpitations, dizziness, syncope, paroxysmal nocturnal dyspnea, orthopnea, or arm or leg swelling  GASTROINTESTINAL: No abdominal  or epigastric pain, nausea, vomiting, hematemesis, diarrhea, constipation, melena or bright red blood.  GENITOURINARY: No dysuria, nocturia, hematuria, or urinary incontinence  NEUROLOGICAL: No headaches, memory loss, slurred speech, limb weakness, loss of strength, numbness, or tremors  MUSCULOSKELETAL: No joint pain or swelling, muscle, back, or extremity pain  PSYCHIATRIC: No depression, anxiety, or difficulty sleeping        Vital Signs Last 24 Hrs  T(C): 36.3 (17 Mar 2021 06:40), Max: 37.3 (16 Mar 2021 22:35)  T(F): 97.3 (17 Mar 2021 06:40), Max: 99.2 (16 Mar 2021 22:35)  HR: 76 (17 Mar 2021 06:40) (76 - 94)  BP: 169/75 (17 Mar 2021 06:40) (157/89 - 170/93)  BP(mean): --  RR: 16 (17 Mar 2021 06:40) (16 - 17)  SpO2: 100% (17 Mar 2021 06:40) (98% - 100%)    PHYSICAL EXAM:  GENERAL: Well appearing, speaking in full sentence, in NAD  HEART: S1S2 RRR; No murmurs, rubs, or gallops appreciated .  PULMONARY:CTABL, normal respiratory effort.  No rales, wheezing, or rhonchi appreciated bilaterally  ABDOMEN: Bowel sounds present, soft, NDNT  EXTREMITIES:  Warm, well -perfused, no pedal edema, distal pulses present  NEUROLOGICAL:AOx3 ,  L facial droop, 4/5 strength on L side, gaze deviation to R, dysarthria       INTERPRETATION OF TELEMETRY:    ECG:    I&O's Detail    16 Mar 2021 07:01  -  17 Mar 2021 07:00  --------------------------------------------------------  IN:    Oral Fluid: 240 mL  Total IN: 240 mL    OUT:    Voided (mL): 600 mL  Total OUT: 600 mL    Total NET: -360 mL          LABS:                        10.6   10.02 )-----------( 356      ( 17 Mar 2021 06:58 )             32.3     03-17    136  |  102  |  32<H>  ----------------------------<  199<H>  4.2   |  25  |  2.21<H>    Ca    8.3<L>      17 Mar 2021 06:58  Phos  4.7     03-17  Mg     2.1     03-17              BNP  I&O's Detail    16 Mar 2021 07:01  -  17 Mar 2021 07:00  --------------------------------------------------------  IN:    Oral Fluid: 240 mL  Total IN: 240 mL    OUT:    Voided (mL): 600 mL  Total OUT: 600 mL    Total NET: -360 mL        Daily     Daily     RADIOLOGY & ADDITIONAL STUDIES:        ASSESSMENT:        RECOMMENDATIONS:    Patient to be staffed with attending. Please await attending addendum Source: patient and Chart    HPI:  This is a 70 year old woman with pmhx of CAD s/p stent 6 years ago, DMT2, ?CKD, HTN who presents to the ER with 1-2 days of generalized weakness. Patient reported she woke up this morning  with generalized weakness. Patient denied fever, chills, diaphoresis, HA, lightheadedness, dizziness, changes in visions, cold like symptoms  (sore throat cough, conjunctivitis runny nose), CP, palpitation, SOB, abdominal pain, n/v/d, hematuria, melena, hematochezia numbness and tingling. According to the chart, patient son stated that she fell and was slightly confused but with no LOC or head strike. She was found to have slurred speech.Patient denied preceding, prodromal symptoms, and postictal period. Patient denied fever, chills, diaphoresis, HA, lightheadedness, dizziness, changes in visions, tongue biting, CP, palpitation, SOB, abdominal pain, n/v/d, urinary incontinence and loss of BM, numbness and tingling before and after events. Last normal per son around 10 PM Friday   ED course was significant for T=98.3  /104mmHG RR 18 spo2 100% on RA. Labs were significant for Na 134,K4.5, BUN 32, BROCK (sCr 2.10).  EKG revealed NSR with left atrial enlargement , LAD. CXR revealed clear lungs. Patient was found to have left sided facial droop. Neurology was consulted and CT head revealed no acute intracranial bleeding, mass effect, or shift.  CTA was unable to be performed 2/2 Cr function. No prior CVA's reported. Not a candidate for tPA 2/2 outside of time window. Not candidate for endovascular 2/2 out of window. Given leukocytosis patient received 1 dose of ceftriaxone in the ER empirically. UA with no leuks nor nitrites. Patient was also given 2L of NS or BROCK.    Patient was admitted to telemetric floor, where  MRI notable for R. anterior choroidal distribution infarct with MRA notable asymptomatic M2 significant stenosis and PCA stenosis as well as 3.4mm from the proximal supraclinoid segment of the ICA. Kidney US revealed no hydronephrosis but had a small subcentimeter left angiomyolipoma and will need outpatient follow up. Endocrinology was also consulted for Dm management. TTE revealed TTE no LVH, normal left ventricular systolic function, no segmental wall motion abnormalities, and normal right ventricular size and function. EP was consulted for LR. There is no telemetry evidence of atrial arrhythmias; nor is there a clear pacing indication at this time.  Per [facility]YSTAL-AF, patient will benefit from prolonged monitoring for detection of AF/PAF as cause of potential cardioembolic source.  ILR implantation for prolonged monitoring for detection of suspected, asymptomatic AF/PAF advised as 2D TTE is without ETHEL clot. Patient denied HA, lightheadedness, dizziness, changes in visions, cold like symptoms, CP, palpitation, SOB, abdominal pain, and n/v/d. The risk and benefits for each procedure were explained in detail which included but not limited to bleeding, infection, stroke, intubation, and death. Patient expressed understanding an all questions were answered      PAST MEDICAL & SURGICAL HISTORY:  Hypertension  DM (diabetes mellitus)  CAD (coronary artery disease)  No significant past surgical history    MEDICATIONS  (STANDING):  aspirin  chewable 81 milliGRAM(s) Oral daily  atorvastatin 80 milliGRAM(s) Oral at bedtime  dextrose 40% Gel 15 Gram(s) Oral once  dextrose 5%. 1000 milliLiter(s) (50 mL/Hr) IV Continuous <Continuous>  dextrose 5%. 1000 milliLiter(s) (100 mL/Hr) IV Continuous <Continuous>  dextrose 50% Injectable 25 Gram(s) IV Push once  dextrose 50% Injectable 12.5 Gram(s) IV Push once  dextrose 50% Injectable 25 Gram(s) IV Push once  fenofibrate Tablet 48 milliGRAM(s) Oral daily  glucagon  Injectable 1 milliGRAM(s) IntraMuscular once  heparin   Injectable 5000 Unit(s) SubCutaneous every 12 hours  insulin glargine Injectable (LANTUS) 15 Unit(s) SubCutaneous every morning  insulin lispro (ADMELOG) corrective regimen sliding scale   SubCutaneous three times a day before meals  insulin lispro (ADMELOG) corrective regimen sliding scale   SubCutaneous at bedtime  insulin lispro Injectable (ADMELOG) 7 Unit(s) SubCutaneous three times a day before meals  insulin NPH human recombinant 7 Unit(s) SubCutaneous once  melatonin 3 milliGRAM(s) Oral at bedtime  NIFEdipine XL 60 milliGRAM(s) Oral daily    MEDICATIONS  (PRN):      FAMILY HISTORY:  No pertinent family history in first degree relatives        SOCIAL HISTORY:  LIVING SITUATION: Lives with her  and son   CIGARETTES: Denied  ALCOHOL: denied   ILLICIT DRUG USES: denied    REVIEW OF SYSTEMS:  CONSTITUTIONAL: No fever, weight loss, chills, shakes, or fatigue  EYES: No eye pain, visual disturbances, or discharge  ENMT:  No difficulty hearing, tinnitus, vertigo; No sinus or throat pain  RESPIRATORY: No cough, wheezing, hemoptysis, or shortness of breath  CARDIOVASCULAR: No chest pain, dyspnea, palpitations, dizziness, syncope, paroxysmal nocturnal dyspnea, orthopnea, or arm or leg swelling  GASTROINTESTINAL: No abdominal  or epigastric pain, nausea, vomiting, hematemesis, diarrhea, constipation, melena or bright red blood.  GENITOURINARY: No dysuria, nocturia, hematuria, or urinary incontinence  NEUROLOGICAL: No headaches, memory loss, Admitted to slurred speech, limb weakness, and loss of strength  MUSCULOSKELETAL: No joint pain or swelling, muscle, back, or extremity pain          Vital Signs Last 24 Hrs  T(C): 36.3 (17 Mar 2021 06:40), Max: 37.3 (16 Mar 2021 22:35)  T(F): 97.3 (17 Mar 2021 06:40), Max: 99.2 (16 Mar 2021 22:35)  HR: 76 (17 Mar 2021 06:40) (76 - 94)  BP: 169/75 (17 Mar 2021 06:40) (157/89 - 170/93)  BP(mean): --  RR: 16 (17 Mar 2021 06:40) (16 - 17)  SpO2: 100% (17 Mar 2021 06:40) (98% - 100%)    PHYSICAL EXAM:  GENERAL: Well appearing, speaking in full sentence, in NAD  HEART: S1S2 RRR; No murmurs, rubs, or gallops appreciated .  PULMONARY:CTABL, normal respiratory effort.  No rales, wheezing, or rhonchi appreciated bilaterally  ABDOMEN: Bowel sounds present, soft, NDNT  EXTREMITIES:  Warm, well -perfused, no pedal edema, distal pulses present  NEUROLOGICAL:AOx3 ,  L facial droop, 4/5 strength on L side, gaze deviation to R, dysarthria       INTERPRETATION OF TELEMETRY:    ECG:    I&O's Detail    16 Mar 2021 07:01  -  17 Mar 2021 07:00  --------------------------------------------------------  IN:    Oral Fluid: 240 mL  Total IN: 240 mL    OUT:    Voided (mL): 600 mL  Total OUT: 600 mL    Total NET: -360 mL          LABS:                        10.6   10.02 )-----------( 356      ( 17 Mar 2021 06:58 )             32.3     03-17    136  |  102  |  32<H>  ----------------------------<  199<H>  4.2   |  25  |  2.21<H>    Ca    8.3<L>      17 Mar 2021 06:58  Phos  4.7     03-17  Mg     2.1     03-17              BNP  I&O's Detail    16 Mar 2021 07:01  -  17 Mar 2021 07:00  --------------------------------------------------------  IN:    Oral Fluid: 240 mL  Total IN: 240 mL    OUT:    Voided (mL): 600 mL  Total OUT: 600 mL    Total NET: -360 mL        Daily     Daily     RADIOLOGY & ADDITIONAL STUDIES:    < from: Transthoracic Echocardiogram (03.15.21 @ 11:08) >  DIMENSIONS:  Dimensions:     Normal Values:  LA:     3.5 cm    2.0 - 4.0 cm  Ao:     2.6 cm    2.0 - 3.8 cm  SEPTUM: 1.0 cm    0.6 - 1.2 cm  PWT:    1.0 cm 0.6 - 1.1 cm  LVIDd:  3.9 cm    3.0 - 5.6 cm  LVIDs:  2.6 cm    1.8 - 4.0 cm  Derived Variables:  LVMI: 84 g/m2  RWT: 0.51  Fractional short: 33 %  Ejection Fraction (Ralph): 63 %  ------------------------------------------------------------------------  OBSERVATIONS:  Mitral Valve: Mitral annular calcification, otherwise  normal mitral valve. Minimal mitral regurgitation.  Aortic Root: Normal aortic root.  Aortic Valve: Calcified trileaflet aortic valve with normal  opening.  Left Atrium: Normal left atrium.  LA volume index = 27  cc/m2.  Left Ventricle: Normal left ventricular systolic function.  No segmental wall motion abnormalities. Increased relative  wall thickness with normal left ventricular mass index,  consistent with concentric left ventricular remodeling.  Right Heart: Normal right atrium. Normal right ventricular  size and function. Normal tricuspid valve. Minimal  tricuspid regurgitation. Normal pulmonic valve. Mild  pulmonic regurgitation.  Pericardium/PleuraNormal pericardium with trace pericardial  effusion.  ------------------------------------------------------------------------  CONCLUSIONS:  1. Mitral annular calcification, otherwise normal mitral  valve. Minimal mitral regurgitation.  2. Calcified trileaflet aortic valve with normal opening.  3. Increased relative wall thickness with normal left  ventricular mass index, consistent with concentric left  ventricular remodeling.  4. Normal left ventricular systolic function. No segmental  wall motion abnormalities.  5. Normal right ventricular size and function.  ------------------------------------------------------------------------  Confirmed on  3/15/2021 - 15:35:15 by RAYMOND Latham  -------------------------------------------------    < end of copied text >     Source: patient and Chart    HPI:  This is a 70 year old woman with pmhx of CAD s/p stent 6 years ago, DMT2, ?CKD, HTN who presents to the ER with 1-2 days of generalized weakness and fall. Patient reported she woke up this morning  with generalized weakness and felt dizzy prior to her fall. Patient stated she was sitting on her couch and felt dizziness and was pushing up against her hand to stand up when she believes her that one of her hand slip and fell down. Patient denied fever, chills, diaphoresis, HA,changes in visions, cold like symptoms  (sore throat cough, conjunctivitis runny nose), CP, palpitation, SOB, abdominal pain, n/v/d, hematuria, melena, hematochezia numbness and tingling. According to the chart, patient son stated that she was slightly confused after the fall but with no LOC or head strike. She was found to have slurred speech. Patient denied preceding, prodromal symptoms, and postictal period. Patient denied fever, chills, diaphoresis, HA, lightheadedness, dizziness, changes in visions, tongue biting, CP, palpitation, SOB, abdominal pain, n/v/d, urinary incontinence and loss of BM, numbness and tingling before and after events. Last normal per son around 10 PM Friday   ED course was significant for T=98.3  /104mmHG RR 18 spo2 100% on RA. Labs were significant for Na 134,K4.5, BUN 32, BROCK (sCr 2.10).  EKG revealed NSR with left atrial enlargement , LAD. CXR revealed clear lungs. Patient was found to have left sided facial droop. Neurology was consulted and CT head revealed no acute intracranial bleeding, mass effect, or shift.  CTA was unable to be performed 2/2 Cr function. No prior CVA's reported. Not a candidate for tPA 2/2 outside of time window. Not candidate for endovascular 2/2 out of window. Given leukocytosis patient received 1 dose of ceftriaxone in the ER empirically. UA with no leuks nor nitrites. Patient was also given 2L of NS or BROCK.    Patient was admitted to telemetric floor, where  MRI notable for R. anterior choroidal distribution infarct with MRA notable asymptomatic M2 significant stenosis and PCA stenosis as well as 3.4mm from the proximal supraclinoid segment of the ICA. Kidney US revealed no hydronephrosis but had a small subcentimeter left angiomyolipoma and will need outpatient follow up. Endocrinology was also consulted for Dm management. TTE revealed TTE no LVH, normal left ventricular systolic function, no segmental wall motion abnormalities, and normal right ventricular size and function. EP was consulted for LR. There is no telemetry evidence of atrial arrhythmias; nor is there a clear pacing indication at this time.  Per [facility]-AF, patient will benefit from prolonged monitoring for detection of AF/PAF as cause of potential cardioembolic source.  ILR implantation for prolonged monitoring for detection of suspected, asymptomatic AF/PAF advised as 2D TTE is without ETHEL clot. Patient denied HA, lightheadedness, dizziness, changes in visions, cold like symptoms, CP, palpitation, SOB, abdominal pain, and n/v/d. The risk and benefits for each procedure were explained in detail which included but not limited to bleeding, infection, stroke, intubation, and death. Patient expressed understanding an all questions were answered. Patient does not want a ILR at this time but may consider it in the future.      PAST MEDICAL & SURGICAL HISTORY:  Hypertension  DM (diabetes mellitus)  CAD (coronary artery disease)  No significant past surgical history    MEDICATIONS  (STANDING):  aspirin  chewable 81 milliGRAM(s) Oral daily  atorvastatin 80 milliGRAM(s) Oral at bedtime  dextrose 40% Gel 15 Gram(s) Oral once  dextrose 5%. 1000 milliLiter(s) (50 mL/Hr) IV Continuous <Continuous>  dextrose 5%. 1000 milliLiter(s) (100 mL/Hr) IV Continuous <Continuous>  dextrose 50% Injectable 25 Gram(s) IV Push once  dextrose 50% Injectable 12.5 Gram(s) IV Push once  dextrose 50% Injectable 25 Gram(s) IV Push once  fenofibrate Tablet 48 milliGRAM(s) Oral daily  glucagon  Injectable 1 milliGRAM(s) IntraMuscular once  heparin   Injectable 5000 Unit(s) SubCutaneous every 12 hours  insulin glargine Injectable (LANTUS) 15 Unit(s) SubCutaneous every morning  insulin lispro (ADMELOG) corrective regimen sliding scale   SubCutaneous three times a day before meals  insulin lispro (ADMELOG) corrective regimen sliding scale   SubCutaneous at bedtime  insulin lispro Injectable (ADMELOG) 7 Unit(s) SubCutaneous three times a day before meals  insulin NPH human recombinant 7 Unit(s) SubCutaneous once  melatonin 3 milliGRAM(s) Oral at bedtime  NIFEdipine XL 60 milliGRAM(s) Oral daily    MEDICATIONS  (PRN):      FAMILY HISTORY:  No pertinent family history in first degree relatives        SOCIAL HISTORY:  LIVING SITUATION: Lives with her  and son   CIGARETTES: Denied  ALCOHOL: denied   ILLICIT DRUG USES: denied    REVIEW OF SYSTEMS:  CONSTITUTIONAL: No fever, weight loss, chills, shakes, or fatigue  EYES: No eye pain, visual disturbances, or discharge  ENMT:  No difficulty hearing, tinnitus, vertigo; No sinus or throat pain  RESPIRATORY: No cough, wheezing, hemoptysis, or shortness of breath  CARDIOVASCULAR: per HPI  GASTROINTESTINAL: No abdominal  or epigastric pain, nausea, vomiting, hematemesis, diarrhea, constipation, melena or bright red blood.  GENITOURINARY: No dysuria, nocturia, hematuria, or urinary incontinence  NEUROLOGICAL: No headaches, memory loss, Admitted to slurred speech, limb weakness, and loss of strength  MUSCULOSKELETAL: No joint pain or swelling, muscle, back, or extremity pain          Vital Signs Last 24 Hrs  T(C): 36.3 (17 Mar 2021 06:40), Max: 37.3 (16 Mar 2021 22:35)  T(F): 97.3 (17 Mar 2021 06:40), Max: 99.2 (16 Mar 2021 22:35)  HR: 76 (17 Mar 2021 06:40) (76 - 94)  BP: 169/75 (17 Mar 2021 06:40) (157/89 - 170/93)  BP(mean): --  RR: 16 (17 Mar 2021 06:40) (16 - 17)  SpO2: 100% (17 Mar 2021 06:40) (98% - 100%)    PHYSICAL EXAM:  GENERAL: Well appearing, speaking in full sentence, in NAD  HEART: S1S2 RRR; No murmurs, rubs, or gallops appreciated .  PULMONARY:CTABL, normal respiratory effort.  No rales, wheezing, or rhonchi appreciated bilaterally  ABDOMEN: Bowel sounds present, soft, NDNT  EXTREMITIES:  Warm, well -perfused, no pedal edema, distal pulses present  NEUROLOGICAL:AOx3 , 4/5 strength on the LUE, 5/5 strength on the LLE and RUE/RLE    INTERPRETATION OF TELEMETRY: SR HR 70-low 100s    ECG:NSR with left atrial enlargement , LAD    I&O's Detail    16 Mar 2021 07:01  -  17 Mar 2021 07:00  --------------------------------------------------------  IN:    Oral Fluid: 240 mL  Total IN: 240 mL    OUT:    Voided (mL): 600 mL  Total OUT: 600 mL    Total NET: -360 mL          LABS:                        10.6   10.02 )-----------( 356      ( 17 Mar 2021 06:58 )             32.3     03-17    136  |  102  |  32<H>  ----------------------------<  199<H>  4.2   |  25  |  2.21<H>    Ca    8.3<L>      17 Mar 2021 06:58  Phos  4.7     03-17  Mg     2.1     03-17              BNP  I&O's Detail    16 Mar 2021 07:01  -  17 Mar 2021 07:00  --------------------------------------------------------  IN:    Oral Fluid: 240 mL  Total IN: 240 mL    OUT:    Voided (mL): 600 mL  Total OUT: 600 mL    Total NET: -360 mL        Daily     Daily     RADIOLOGY & ADDITIONAL STUDIES:    < from: Transthoracic Echocardiogram (03.15.21 @ 11:08) >  DIMENSIONS:  Dimensions:     Normal Values:  LA:     3.5 cm    2.0 - 4.0 cm  Ao:     2.6 cm    2.0 - 3.8 cm  SEPTUM: 1.0 cm    0.6 - 1.2 cm  PWT:    1.0 cm 0.6 - 1.1 cm  LVIDd:  3.9 cm    3.0 - 5.6 cm  LVIDs:  2.6 cm    1.8 - 4.0 cm  Derived Variables:  LVMI: 84 g/m2  RWT: 0.51  Fractional short: 33 %  Ejection Fraction (Teicholtz): 63 %  ------------------------------------------------------------------------  OBSERVATIONS:  Mitral Valve: Mitral annular calcification, otherwise  normal mitral valve. Minimal mitral regurgitation.  Aortic Root: Normal aortic root.  Aortic Valve: Calcified trileaflet aortic valve with normal  opening.  Left Atrium: Normal left atrium.  LA volume index = 27  cc/m2.  Left Ventricle: Normal left ventricular systolic function.  No segmental wall motion abnormalities. Increased relative  wall thickness with normal left ventricular mass index,  consistent with concentric left ventricular remodeling.  Right Heart: Normal right atrium. Normal right ventricular  size and function. Normal tricuspid valve. Minimal  tricuspid regurgitation. Normal pulmonic valve. Mild  pulmonic regurgitation.  Pericardium/PleuraNormal pericardium with trace pericardial  effusion.  ------------------------------------------------------------------------  CONCLUSIONS:  1. Mitral annular calcification, otherwise normal mitral  valve. Minimal mitral regurgitation.  2. Calcified trileaflet aortic valve with normal opening.  3. Increased relative wall thickness with normal left  ventricular mass index, consistent with concentric left  ventricular remodeling.  4. Normal left ventricular systolic function. No segmental  wall motion abnormalities.  5. Normal right ventricular size and function.  ------------------------------------------------------------------------  Confirmed on  3/15/2021 - 15:35:15 by RAYMOND Latham  -------------------------------------------------    < end of copied text >

## 2021-03-17 NOTE — CONSULT NOTE ADULT - ASSESSMENT
This is a 70 year old woman with pmhx of CAD s/p stent 6 years ago, DMT2, ?CKD, HTN who presents to the ER with 1-2 days of generalized weakness with fall at home and slurred speech found to have acute CVA and admitted for stroke. TTE revealed TTE no LVH, normal left ventricular systolic function, no segmental wall motion abnormalities, and normal right ventricular size and function. EP was consulted for ILR. Per CRYSTAL-AF, patient will benefit from prolonged monitoring for detection of AF/PAF as cause of potential cardioembolic source.  ILR implantation for prolonged monitoring for detection of suspected, asymptomatic AF/PAF. Patient denied HA, lightheadedness, dizziness, changes in visions, cold like symptoms, CP, palpitation, SOB, abdominal pain, and n/v/d. The risk and benefits for each procedure were explained in detail which included but not limited to bleeding, infection, stroke, intubation, and death. Patient expressed understanding an all questions were answered    Plan:  CVA  - Continuous telemetric monitoring   - Monitor electrolytes and replete K to 4 and Mg to 2   - TTE revealed TTE no LVH, normal left ventricular systolic function, no segmental wall motion abnormalities, and normal right ventricular size and function.  -patient will benefit from prolonged monitoring for detection of AF/PAF as cause of potential cardioembolic source.  ILR implantation for prolonged monitoring for detection of suspected, asymptomatic AF/PAF. Patient denied HA, lightheadedness, dizziness, changes in visions, cold like symptoms, CP, palpitation, SOB, abdominal pain, and n/v/d. The risk and benefits for each procedure were explained in detail which included but not limited to bleeding, infection, stroke, intubation, and death. Patient expressed understanding an all questions were answered    Pan [address] PA-C  Patient to be staffed with attending. Please await attending addendum This is a 70 year old woman with pmhx of CAD s/p stent 6 years ago, DMT2, ?CKD, HTN who presents to the ER with 1-2 days of generalized weakness with fall at home and slurred speech found to have acute CVA and admitted for stroke. TTE revealed TTE no LVH, normal left ventricular systolic function, no segmental wall motion abnormalities, and normal right ventricular size and function. EP was consulted for ILR. Per CRYSTAL-AF, patient will benefit from prolonged monitoring for detection of AF/PAF as cause of potential cardioembolic source.  ILR implantation for prolonged monitoring for detection of suspected, asymptomatic AF/PAF. Patient denied HA, lightheadedness, dizziness, changes in visions, cold like symptoms, CP, palpitation, SOB, abdominal pain, and n/v/d. The risk and benefits for each procedure were explained in detail which included but not limited to bleeding, infection, stroke, intubation, and death. Patient expressed understanding an all questions were answered. Patient expressed understanding an all questions were answered. Patient does not want a ILR at this time but may consider it in the future.      Plan:  CVA  - Continuous telemetric monitoring   - Monitor electrolytes and replete K to 4 and Mg to 2   - TTE revealed TTE no LVH, normal left ventricular systolic function, no segmental wall motion abnormalities, and normal right ventricular size and function. report above  -patient will benefit from prolonged monitoring for detection of AF/PAF as cause of potential cardioembolic source.  ILR implantation for prolonged monitoring for detection of suspected, asymptomatic AF/PAF. Patient denied HA, lightheadedness, dizziness, changes in visions, cold like symptoms, CP, palpitation, SOB, abdominal pain, and n/v/d. The risk and benefits for each procedure were explained in detail which included but not limited to bleeding, infection, stroke, intubation, and death. Patient expressed understanding an all questions were answered. Patient does not want a ILR at this time but may consider it in the future.      Pan Street PA-C  Patient to be staffed with attending. Please await attending addendum

## 2021-03-17 NOTE — PROGRESS NOTE ADULT - SUBJECTIVE AND OBJECTIVE BOX
Neurology Progress Note    Interval History:  No acute events overnight. Patient seen and examined at bedside. Patient expresses that she is feeling better. She has no other neurologic complaints at this time. She denies fevers/chills, headaches, dizziness, and vertigo.     HPI:  Pt is a 71 y/o woman with pmhx of CAD s/p stent 6 years ago, Dm2, ?CKD, HTN who presents to the ER with 1-2 days of generalized weakness. Pt reports she woke up this AM very weak all over but could still walk. No new changes in meds. She denied any fevers but had chills. no cough, chest pain, palpitations, shortness of breath, abd pain, dysuria or diarrhea. Reportedly per her son she had fallen and was slightly confused but with no LOC or head strike. She was found to have slurred speech at some point as well. She denies any headache or vision changes. Last normal per son around 10 PM friday. in the ER pt was given asa, ceftriaxone and 2L NS.   (14 Mar 2021 05:43)    ROS: Negative except as otherwise noted above.     PAST MEDICAL & SURGICAL HISTORY:  Hypertension    DM (diabetes mellitus)    CAD (coronary artery disease)    No significant past surgical history            Medications:  aspirin  chewable 81 milliGRAM(s) Oral daily  atorvastatin 80 milliGRAM(s) Oral at bedtime  dextrose 40% Gel 15 Gram(s) Oral once  dextrose 5%. 1000 milliLiter(s) IV Continuous <Continuous>  dextrose 5%. 1000 milliLiter(s) IV Continuous <Continuous>  dextrose 50% Injectable 25 Gram(s) IV Push once  dextrose 50% Injectable 12.5 Gram(s) IV Push once  dextrose 50% Injectable 25 Gram(s) IV Push once  fenofibrate Tablet 48 milliGRAM(s) Oral daily  glucagon  Injectable 1 milliGRAM(s) IntraMuscular once  heparin   Injectable 5000 Unit(s) SubCutaneous every 12 hours  insulin glargine Injectable (LANTUS) 15 Unit(s) SubCutaneous every morning  insulin lispro (ADMELOG) corrective regimen sliding scale   SubCutaneous three times a day before meals  insulin lispro (ADMELOG) corrective regimen sliding scale   SubCutaneous at bedtime  insulin lispro Injectable (ADMELOG) 7 Unit(s) SubCutaneous three times a day before meals  insulin NPH human recombinant 7 Unit(s) SubCutaneous once  melatonin 3 milliGRAM(s) Oral at bedtime  NIFEdipine XL 60 milliGRAM(s) Oral daily      Vital Signs Last 24 Hrs  T(C): 36.1 (17 Mar 2021 11:59), Max: 37.3 (16 Mar 2021 22:35)  T(F): 97 (17 Mar 2021 11:59), Max: 99.2 (16 Mar 2021 22:35)  HR: 78 (17 Mar 2021 11:59) (76 - 93)  BP: 170/72 (17 Mar 2021 11:59) (169/70 - 170/93)  BP(mean): --  RR: 16 (17 Mar 2021 11:59) (16 - 17)  SpO2: 100% (17 Mar 2021 11:59) (98% - 100%)    Neurological Exam:   General: Patient appears comfortable and in NAD. Found laying in bed. WN/WD.     Mental status: Awake, alert and oriented x3.  Recent and remote memory intact.  Attention/concentration intact. Speech is fluent with no dysarthria, no aphasia.  Fund of knowledge appropriate. Normal affect. Follows all commands.     Cranial nerves:  visual field defect localized to the left lateral plane, no nystagmus, extraocular muscles intact, V1 through V3 intact bilaterally and symmetric, face asymmetric with mild left facial droop, hearing intact    Motor: Strength 5/5 Right UE/LE. Strength 4/5 left UE/LE (positive for drift).   strength 5/5.  Normal tone and bulk.     Sensation: Intact to light touch, proprioception, and pinprick. Extinction WNL.     Coordination: Dysmetria of Left UE/LE with intention tremor on Finger to nose testing and heel to shin testing.     Reflexes: deferred   Gait: deferred    Labs:  CBC Full  -  ( 17 Mar 2021 06:58 )  WBC Count : 10.02 K/uL  RBC Count : 3.88 M/uL  Hemoglobin : 10.6 g/dL  Hematocrit : 32.3 %  Platelet Count - Automated : 356 K/uL  Mean Cell Volume : 83.2 fL  Mean Cell Hemoglobin : 27.3 pg  Mean Cell Hemoglobin Concentration : 32.8 gm/dL  Auto Neutrophil # : x  Auto Lymphocyte # : x  Auto Monocyte # : x  Auto Eosinophil # : x  Auto Basophil # : x  Auto Neutrophil % : x  Auto Lymphocyte % : x  Auto Monocyte % : x  Auto Eosinophil % : x  Auto Basophil % : x    03-17    136  |  102  |  32<H>  ----------------------------<  199<H>  4.2   |  25  |  2.21<H>    Ca    8.3<L>      17 Mar 2021 06:58  Phos  4.7     03-17  Mg     2.1     03-17              Assessment:  This is a 70y Female w/ h/o     Plan: Neurology Progress Note    Interval History:  No acute events overnight. Patient seen and examined at bedside. Patient expresses that she is feeling better. She has no other neurologic complaints at this time. She denies fevers/chills, headaches, dizziness, and vertigo.     HPI:  Pt is a 69 y/o woman with pmhx of CAD s/p stent 6 years ago, Dm2, ?CKD, HTN who presents to the ER with 1-2 days of generalized weakness. Pt reports she woke up this AM very weak all over but could still walk. No new changes in meds. She denied any fevers but had chills. no cough, chest pain, palpitations, shortness of breath, abd pain, dysuria or diarrhea. Reportedly per her son she had fallen and was slightly confused but with no LOC or head strike. She was found to have slurred speech at some point as well. She denies any headache or vision changes. Last normal per son around 10 PM friday. in the ER pt was given asa, ceftriaxone and 2L NS.   (14 Mar 2021 05:43)    ROS: Negative except as otherwise noted above.     PAST MEDICAL & SURGICAL HISTORY:  Hypertension    DM (diabetes mellitus)    CAD (coronary artery disease)    No significant past surgical history            Medications:  aspirin  chewable 81 milliGRAM(s) Oral daily  atorvastatin 80 milliGRAM(s) Oral at bedtime  dextrose 40% Gel 15 Gram(s) Oral once  dextrose 5%. 1000 milliLiter(s) IV Continuous <Continuous>  dextrose 5%. 1000 milliLiter(s) IV Continuous <Continuous>  dextrose 50% Injectable 25 Gram(s) IV Push once  dextrose 50% Injectable 12.5 Gram(s) IV Push once  dextrose 50% Injectable 25 Gram(s) IV Push once  fenofibrate Tablet 48 milliGRAM(s) Oral daily  glucagon  Injectable 1 milliGRAM(s) IntraMuscular once  heparin   Injectable 5000 Unit(s) SubCutaneous every 12 hours  insulin glargine Injectable (LANTUS) 15 Unit(s) SubCutaneous every morning  insulin lispro (ADMELOG) corrective regimen sliding scale   SubCutaneous three times a day before meals  insulin lispro (ADMELOG) corrective regimen sliding scale   SubCutaneous at bedtime  insulin lispro Injectable (ADMELOG) 7 Unit(s) SubCutaneous three times a day before meals  insulin NPH human recombinant 7 Unit(s) SubCutaneous once  melatonin 3 milliGRAM(s) Oral at bedtime  NIFEdipine XL 60 milliGRAM(s) Oral daily      Vital Signs Last 24 Hrs  T(C): 36.1 (17 Mar 2021 11:59), Max: 37.3 (16 Mar 2021 22:35)  T(F): 97 (17 Mar 2021 11:59), Max: 99.2 (16 Mar 2021 22:35)  HR: 78 (17 Mar 2021 11:59) (76 - 93)  BP: 170/72 (17 Mar 2021 11:59) (169/70 - 170/93)  BP(mean): --  RR: 16 (17 Mar 2021 11:59) (16 - 17)  SpO2: 100% (17 Mar 2021 11:59) (98% - 100%)    Neurological Exam:   General: Patient appears comfortable and in NAD. Found laying in bed. WN/WD.     Mental status: Awake, alert and oriented x3. Attention/concentration intact. Speech is fluent with no dysarthria, no aphasia.  Fund of knowledge appropriate (correctly named person, place, time, current president, and asked questions on neurology follow/up for stroke. Normal affect. Follows all commands.     Cranial nerves:  visual field defect localized to the left lateral plane, no nystagmus, extraocular muscles intact, V1 through V3 intact bilaterally and symmetric, face asymmetric with mild left facial droop, hearing intact    Motor: Strength 5/5 Right UE/LE. Strength 4/5 left UE/LE (positive for drift).   strength 5/5.  Normal tone and bulk.     Sensation: Intact to light touch, proprioception, and pinprick. Extinction WNL.     Coordination: Dysmetria of Left UE/LE with intention tremor on Finger to nose testing and heel to shin testing.     Reflexes: deferred   Gait: deferred    Labs:  CBC Full  -  ( 17 Mar 2021 06:58 )  WBC Count : 10.02 K/uL  RBC Count : 3.88 M/uL  Hemoglobin : 10.6 g/dL  Hematocrit : 32.3 %  Platelet Count - Automated : 356 K/uL  Mean Cell Volume : 83.2 fL  Mean Cell Hemoglobin : 27.3 pg  Mean Cell Hemoglobin Concentration : 32.8 gm/dL  Auto Neutrophil # : x  Auto Lymphocyte # : x  Auto Monocyte # : x  Auto Eosinophil # : x  Auto Basophil # : x  Auto Neutrophil % : x  Auto Lymphocyte % : x  Auto Monocyte % : x  Auto Eosinophil % : x  Auto Basophil % : x    03-17    136  |  102  |  32<H>  ----------------------------<  199<H>  4.2   |  25  |  2.21<H>    Ca    8.3<L>      17 Mar 2021 06:58  Phos  4.7     03-17  Mg     2.1     03-17

## 2021-03-17 NOTE — PROVIDER CONTACT NOTE (OTHER) - ASSESSMENT
Left arm /85   Right arm /89   NSR on monitor. Vitals in chart.
SBP: 180, Pt asymptomatic, No c/o headache, no c/o dizziness, no syncopy noted. Due for nifidipine.
TammiA Matthew- /93 HR 90.  Patient asymptomatic
patient asymptomatic
/76 HR 76. Patient asymptomatic. Nifedipine given 2 hrs ago.
503A Matthew- /100 HR 84. Vitals in chart. Patient asymptomatic.
/81 HR 80. vitals in chart. Patient is asymptomatic.
Patient experiencing urinary frequency and possible urinary and bowel incontinence. Patient states has never experience incontinence before. Patient had one episode of stool in bed pan but did not know she had a bowel movement. Patient asking for bed pan frequently for urinating.

## 2021-03-17 NOTE — PROVIDER CONTACT NOTE (OTHER) - DATE AND TIME:
15-Mar-2021 12:00
14-Mar-2021 16:39
16-Mar-2021 05:48
14-Mar-2021 13:06
14-Mar-2021 14:17
14-Mar-2021 18:35
15-Mar-2021 17:45
16-Mar-2021 17:14
17-Mar-2021 12:30
15-Mar-2021 15:25

## 2021-03-18 LAB
ANION GAP SERPL CALC-SCNC: 10 MMOL/L — SIGNIFICANT CHANGE UP (ref 7–14)
BUN SERPL-MCNC: 35 MG/DL — HIGH (ref 7–23)
CALCIUM SERPL-MCNC: 8.7 MG/DL — SIGNIFICANT CHANGE UP (ref 8.4–10.5)
CHLORIDE SERPL-SCNC: 101 MMOL/L — SIGNIFICANT CHANGE UP (ref 98–107)
CO2 SERPL-SCNC: 24 MMOL/L — SIGNIFICANT CHANGE UP (ref 22–31)
CREAT SERPL-MCNC: 2.21 MG/DL — HIGH (ref 0.5–1.3)
GLUCOSE BLDC GLUCOMTR-MCNC: 160 MG/DL — HIGH (ref 70–99)
GLUCOSE BLDC GLUCOMTR-MCNC: 170 MG/DL — HIGH (ref 70–99)
GLUCOSE BLDC GLUCOMTR-MCNC: 201 MG/DL — HIGH (ref 70–99)
GLUCOSE BLDC GLUCOMTR-MCNC: 244 MG/DL — HIGH (ref 70–99)
GLUCOSE SERPL-MCNC: 183 MG/DL — HIGH (ref 70–99)
HCT VFR BLD CALC: 34.1 % — LOW (ref 34.5–45)
HGB BLD-MCNC: 11.3 G/DL — LOW (ref 11.5–15.5)
MAGNESIUM SERPL-MCNC: 2.2 MG/DL — SIGNIFICANT CHANGE UP (ref 1.6–2.6)
MCHC RBC-ENTMCNC: 27 PG — SIGNIFICANT CHANGE UP (ref 27–34)
MCHC RBC-ENTMCNC: 33.1 GM/DL — SIGNIFICANT CHANGE UP (ref 32–36)
MCV RBC AUTO: 81.6 FL — SIGNIFICANT CHANGE UP (ref 80–100)
NRBC # BLD: 0 /100 WBCS — SIGNIFICANT CHANGE UP
NRBC # FLD: 0 K/UL — SIGNIFICANT CHANGE UP
PHOSPHATE SERPL-MCNC: 5 MG/DL — HIGH (ref 2.5–4.5)
PLATELET # BLD AUTO: 381 K/UL — SIGNIFICANT CHANGE UP (ref 150–400)
POTASSIUM SERPL-MCNC: 4.2 MMOL/L — SIGNIFICANT CHANGE UP (ref 3.5–5.3)
POTASSIUM SERPL-SCNC: 4.2 MMOL/L — SIGNIFICANT CHANGE UP (ref 3.5–5.3)
RBC # BLD: 4.18 M/UL — SIGNIFICANT CHANGE UP (ref 3.8–5.2)
RBC # FLD: 12.5 % — SIGNIFICANT CHANGE UP (ref 10.3–14.5)
SODIUM SERPL-SCNC: 135 MMOL/L — SIGNIFICANT CHANGE UP (ref 135–145)
WBC # BLD: 12.19 K/UL — HIGH (ref 3.8–10.5)
WBC # FLD AUTO: 12.19 K/UL — HIGH (ref 3.8–10.5)

## 2021-03-18 PROCEDURE — 99232 SBSQ HOSP IP/OBS MODERATE 35: CPT

## 2021-03-18 PROCEDURE — 99233 SBSQ HOSP IP/OBS HIGH 50: CPT

## 2021-03-18 RX ORDER — INSULIN GLARGINE 100 [IU]/ML
18 INJECTION, SOLUTION SUBCUTANEOUS
Refills: 0 | Status: DISCONTINUED | OUTPATIENT
Start: 2021-03-19 | End: 2021-03-19

## 2021-03-18 RX ADMIN — HEPARIN SODIUM 5000 UNIT(S): 5000 INJECTION INTRAVENOUS; SUBCUTANEOUS at 05:16

## 2021-03-18 RX ADMIN — Medication 2: at 12:38

## 2021-03-18 RX ADMIN — Medication 81 MILLIGRAM(S): at 09:45

## 2021-03-18 RX ADMIN — ATORVASTATIN CALCIUM 80 MILLIGRAM(S): 80 TABLET, FILM COATED ORAL at 22:02

## 2021-03-18 RX ADMIN — Medication 48 MILLIGRAM(S): at 09:45

## 2021-03-18 RX ADMIN — Medication 2: at 09:44

## 2021-03-18 RX ADMIN — Medication 1: at 17:28

## 2021-03-18 RX ADMIN — INSULIN GLARGINE 15 UNIT(S): 100 INJECTION, SOLUTION SUBCUTANEOUS at 09:43

## 2021-03-18 RX ADMIN — Medication 7 UNIT(S): at 12:38

## 2021-03-18 RX ADMIN — Medication 7 UNIT(S): at 09:43

## 2021-03-18 RX ADMIN — Medication 90 MILLIGRAM(S): at 05:16

## 2021-03-18 RX ADMIN — Medication 3 MILLIGRAM(S): at 22:02

## 2021-03-18 RX ADMIN — HEPARIN SODIUM 5000 UNIT(S): 5000 INJECTION INTRAVENOUS; SUBCUTANEOUS at 17:28

## 2021-03-18 RX ADMIN — Medication 7 UNIT(S): at 17:28

## 2021-03-18 NOTE — PROGRESS NOTE ADULT - SUBJECTIVE AND OBJECTIVE BOX
Subjective: Denies HA, lightheadedness, dizziness, CP, SOB, abdominal pain, N/V.      Interval events:  -Presents to the ER with 1-2 days of generalized weakness with fall at home and slurred speech found to have acute CVA and admitted for stroke.  - TTE revealed TTE no LVH, normal left ventricular systolic function, no segmental wall motion abnormalities, and normal right ventricular size and function.   - EP was consulted for ILR. Per CRYSTAL-AF, patient will benefit from prolonged monitoring for detection of AF/PAF as cause of potential cardioembolic source.  ILR implantation for prolonged monitoring for detection of suspected, asymptomatic AF/PAF. Patient denied HA, lightheadedness, dizziness, changes in visions, cold like symptoms, CP, palpitation, SOB, abdominal pain, and n/v/d. The risk and benefits for each procedure were explained in detail which included but not limited to bleeding, infection, stroke, intubation, and death. Patient expressed understanding an all questions were answered. Patient expressed understanding an all questions were answered. Patient does not want a ILR at this time but may consider it in the future.    MEDICATIONS  (STANDING):  aspirin  chewable 81 milliGRAM(s) Oral daily  atorvastatin 80 milliGRAM(s) Oral at bedtime  dextrose 40% Gel 15 Gram(s) Oral once  dextrose 5%. 1000 milliLiter(s) (50 mL/Hr) IV Continuous <Continuous>  dextrose 5%. 1000 milliLiter(s) (100 mL/Hr) IV Continuous <Continuous>  dextrose 50% Injectable 25 Gram(s) IV Push once  dextrose 50% Injectable 12.5 Gram(s) IV Push once  dextrose 50% Injectable 25 Gram(s) IV Push once  fenofibrate Tablet 48 milliGRAM(s) Oral daily  glucagon  Injectable 1 milliGRAM(s) IntraMuscular once  heparin   Injectable 5000 Unit(s) SubCutaneous every 12 hours  insulin glargine Injectable (LANTUS) 15 Unit(s) SubCutaneous every morning  insulin lispro (ADMELOG) corrective regimen sliding scale   SubCutaneous three times a day before meals  insulin lispro (ADMELOG) corrective regimen sliding scale   SubCutaneous at bedtime  insulin lispro Injectable (ADMELOG) 7 Unit(s) SubCutaneous three times a day before meals  melatonin 3 milliGRAM(s) Oral at bedtime  NIFEdipine XL 90 milliGRAM(s) Oral daily    MEDICATIONS  (PRN):    Vital Signs Last 24 Hrs  T(C): 36.8 (18 Mar 2021 05:00), Max: 36.8 (17 Mar 2021 21:35)  T(F): 98.2 (18 Mar 2021 05:00), Max: 98.3 (17 Mar 2021 21:35)  HR: 93 (18 Mar 2021 05:00) (88 - 93)  BP: 143/77 (18 Mar 2021 05:00) (129/68 - 169/81)  BP(mean): --  RR: 16 (18 Mar 2021 05:00) (16 - 17)  SpO2: 99% (18 Mar 2021 05:00) (99% - 100%)  I&O's Detail    17 Mar 2021 07:01  -  18 Mar 2021 07:00  --------------------------------------------------------  IN:  Total IN: 0 mL    OUT:    Voided (mL): 950 mL  Total OUT: 950 mL    Total NET: -950 mL    Physical Exam:  GENERAL: Sitting up in bed eating breakfast, well appearing, speaking in full sentence, in NAD  HEART: S1S2 RRR; No murmurs, rubs, or gallops appreciated  PULMONARY: CTABL, normal respiratory effort.  No rales, wheezing, or rhonchi appreciated bilaterally  ABDOMEN: + Bowel sounds present, soft, NDNT  EXTREMITIES:  Warm, well -perfused, no pedal edema, distal pulses present    TELEMETERIC:SR  no events                            11.3   12.19 )-----------( 381      ( 18 Mar 2021 06:44 )             34.1       03-18    135  |  101  |  35<H>  ----------------------------<  183<H>  4.2   |  24  |  2.21<H>    Ca    8.7      18 Mar 2021 06:44  Phos  5.0     03-18  Mg     2.2     03-18

## 2021-03-18 NOTE — PROGRESS NOTE ADULT - SUBJECTIVE AND OBJECTIVE BOX
Chief Complaint: DM2    History: tolerating po  no hypoglycemia events or symptoms    MEDICATIONS  (STANDING):  aspirin  chewable 81 milliGRAM(s) Oral daily  atorvastatin 80 milliGRAM(s) Oral at bedtime  dextrose 40% Gel 15 Gram(s) Oral once  dextrose 5%. 1000 milliLiter(s) (50 mL/Hr) IV Continuous <Continuous>  dextrose 5%. 1000 milliLiter(s) (100 mL/Hr) IV Continuous <Continuous>  dextrose 50% Injectable 25 Gram(s) IV Push once  dextrose 50% Injectable 12.5 Gram(s) IV Push once  dextrose 50% Injectable 25 Gram(s) IV Push once  fenofibrate Tablet 48 milliGRAM(s) Oral daily  glucagon  Injectable 1 milliGRAM(s) IntraMuscular once  heparin   Injectable 5000 Unit(s) SubCutaneous every 12 hours  insulin glargine Injectable (LANTUS) 15 Unit(s) SubCutaneous every morning  insulin lispro (ADMELOG) corrective regimen sliding scale   SubCutaneous three times a day before meals  insulin lispro (ADMELOG) corrective regimen sliding scale   SubCutaneous at bedtime  insulin lispro Injectable (ADMELOG) 7 Unit(s) SubCutaneous three times a day before meals  melatonin 3 milliGRAM(s) Oral at bedtime  NIFEdipine XL 90 milliGRAM(s) Oral daily    MEDICATIONS  (PRN):      Allergies    No Known Allergies    Intolerances      Review of Systems:    ALL OTHER SYSTEMS REVIEWED AND NEGATIVE      PHYSICAL EXAM:  VITALS: T(C): 36.9 (03-18-21 @ 12:15)  T(F): 98.4 (03-18-21 @ 12:15), Max: 98.4 (03-18-21 @ 12:15)  HR: 100 (03-18-21 @ 12:15) (88 - 100)  BP: 133/61 (03-18-21 @ 12:15) (129/68 - 169/81)  RR:  (16 - 17)  SpO2:  (98% - 100%)  Wt(kg): --  GENERAL: NAD, well-groomed, well-developed  EYES: No proptosis, no lid lag, anicteric  HEENT:  Atraumatic, Normocephalic, moist mucous membranes  RESPIRATORY: nonlabored respirations  MUSCULOSKELETAL: LUE mildly weak  PSYCH: Alert and oriented x 3, normal affect, normal mood    CAPILLARY BLOOD GLUCOSE      POCT Blood Glucose.: 244 mg/dL (18 Mar 2021 12:28)  POCT Blood Glucose.: 201 mg/dL (18 Mar 2021 08:41)  POCT Blood Glucose.: 192 mg/dL (17 Mar 2021 21:26)  POCT Blood Glucose.: 232 mg/dL (17 Mar 2021 17:25)      03-18    135  |  101  |  35<H>  ----------------------------<  183<H>  4.2   |  24  |  2.21<H>    EGFR if : 26<L>  EGFR if non : 22<L>    Ca    8.7      03-18  Mg     2.2     03-18  Phos  5.0     03-18        A1C with Estimated Average Glucose Result: 13.9 % (03-15-21 @ 06:38)  A1C with Estimated Average Glucose Result: 14.0 % (03-14-21 @ 06:23)      Thyroid Function Tests:  03-13 @ 23:00 TSH 2.54 FreeT4 -- T3 -- Anti TPO -- Anti Thyroglobulin Ab -- TSI --

## 2021-03-18 NOTE — PROGRESS NOTE ADULT - ASSESSMENT
70 yr old F with PMH of CAD s/p PCI, DM2 uncontrolled A1C 13.9%, ?CKD here with acute CVA.     Type 2 diabetes mellitus with hyperglycemia, without long-term current use of insulin.   While inpatient consistent carb (dysphagia) diet and FS AC and HS  Goal Glucose 100-180  c-peptide not low  Follow up Islet cell and JOJO Ab  Transitioned Lantus to morning dosing for this morning (received one time dose of NPH last night to transition)   Increase Lantus to 18 units q9AM  Continue Admelog 7 units tid with meals.   continue low scale premeal and low bedtime scale    Recommend basal/bolus regimen on discharge with discontinuation of prior orals (metformin, Januvia, glimepride)  -Long acting insulin once daily in the morning (dose to be determiend)  -Admelog with breakfast and lunch (to be administer by son/ while they are at home, would give Prandin 2-4 mg with dinner when patient is alone.   -Awaiting workup for antibodies but less likely to be significant given normal c-peptide.    Patient is requesting  karma for visiting nurse  Patient's family will require insulin pen teaching  Nutrition consult  Pt can f/u with [facility] endocrinology [phone].    Hypertension, unspecified type.    Patient with recent CVA event  BP management as per primary team.     Hyperlipidemia  LDL goal less than 70mg/dl  Recommend to continue with statin therapy.

## 2021-03-18 NOTE — PROGRESS NOTE ADULT - SUBJECTIVE AND OBJECTIVE BOX
Sherry Mims  [facility] Medicine  Pager #[phone]    Patient is a 70y old  Female who presents with a chief complaint of generalized weakness, cva (17 Mar 2021 14:36)      SUBJECTIVE / OVERNIGHT EVENTS: Patient seen and examined at bedside. Patient wants to go home. Denies SOB, pain or dysuria. Reports that she is urinating more frequently.     ADDITIONAL REVIEW OF SYSTEMS:    MEDICATIONS  (STANDING):  aspirin  chewable 81 milliGRAM(s) Oral daily  atorvastatin 80 milliGRAM(s) Oral at bedtime  dextrose 40% Gel 15 Gram(s) Oral once  dextrose 5%. 1000 milliLiter(s) (50 mL/Hr) IV Continuous <Continuous>  dextrose 5%. 1000 milliLiter(s) (100 mL/Hr) IV Continuous <Continuous>  dextrose 50% Injectable 25 Gram(s) IV Push once  dextrose 50% Injectable 12.5 Gram(s) IV Push once  dextrose 50% Injectable 25 Gram(s) IV Push once  fenofibrate Tablet 48 milliGRAM(s) Oral daily  glucagon  Injectable 1 milliGRAM(s) IntraMuscular once  heparin   Injectable 5000 Unit(s) SubCutaneous every 12 hours  insulin glargine Injectable (LANTUS) 15 Unit(s) SubCutaneous every morning  insulin lispro (ADMELOG) corrective regimen sliding scale   SubCutaneous three times a day before meals  insulin lispro (ADMELOG) corrective regimen sliding scale   SubCutaneous at bedtime  insulin lispro Injectable (ADMELOG) 7 Unit(s) SubCutaneous three times a day before meals  melatonin 3 milliGRAM(s) Oral at bedtime  NIFEdipine XL 90 milliGRAM(s) Oral daily    MEDICATIONS  (PRN):      CAPILLARY BLOOD GLUCOSE      POCT Blood Glucose.: 201 mg/dL (18 Mar 2021 08:41)  POCT Blood Glucose.: 192 mg/dL (17 Mar 2021 21:26)  POCT Blood Glucose.: 232 mg/dL (17 Mar 2021 17:25)  POCT Blood Glucose.: 174 mg/dL (17 Mar 2021 12:24)    I&O's Summary    17 Mar 2021 07:01  -  18 Mar 2021 07:00  --------------------------------------------------------  IN: 0 mL / OUT: 950 mL / NET: -950 mL        PHYSICAL EXAM:    Vital Signs Last 24 Hrs  T(C): 36.9 (18 Mar 2021 12:15), Max: 36.9 (18 Mar 2021 12:15)  T(F): 98.4 (18 Mar 2021 12:15), Max: 98.4 (18 Mar 2021 12:15)  HR: 100 (18 Mar 2021 12:15) (88 - 100)  BP: 133/61 (18 Mar 2021 12:15) (129/68 - 169/81)  BP(mean): --  RR: 17 (18 Mar 2021 12:15) (16 - 17)  SpO2: 98% (18 Mar 2021 12:15) (98% - 100%)    CONSTITUTIONAL: NAD, well-developed, well-groomed  EYES: Conjunctiva and sclera clear  ENMT: Moist oral mucosa  RESPIRATORY: Normal respiratory effort; lungs are clear to auscultation bilaterally  CARDIOVASCULAR: Regular rate and rhythm, normal S1 and S2, no murmur/rub/gallop; No lower extremity edema  ABDOMEN: Nontender to palpation, normoactive bowel sounds  MUSCULOSKELETAL: No clubbing or cyanosis of digits  PSYCH: A+O to person, place, and time; affect appropriate  NEUROLOGY: 4/5 strength LUE, 5/5 strength other extremities  SKIN: No rashes; no palpable lesions    LABS:                        11.3   12.19 )-----------( 381      ( 18 Mar 2021 06:44 )             34.1     03-18    135  |  101  |  35<H>  ----------------------------<  183<H>  4.2   |  24  |  2.21<H>    Ca    8.7      18 Mar 2021 06:44  Phos  5.0     03-18  Mg     2.2     03-18      RADIOLOGY & ADDITIONAL TESTS: No new imaging  Results Reviewed: Yes  Imaging Personally Reviewed:  Electrocardiogram Personally Reviewed:    COORDINATION OF CARE:  Care Discussed with Consultants/Other Providers [Y/N]:  Prior or Outpatient Records Reviewed [Y/N]:

## 2021-03-18 NOTE — PROGRESS NOTE ADULT - ASSESSMENT
This is a 70 year old woman with pmhx of CAD s/p stent 6 years ago, DMT2, ?CKD, HTN who presents to the ER with 1-2 days of generalized weakness with fall at home and slurred speech found to have acute CVA and admitted for stroke. TTE revealed TTE no LVH, normal left ventricular systolic function, no segmental wall motion abnormalities, and normal right ventricular size and function. EP was consulted for ILR. Per [facility]YSTAL-AF, patient will benefit from prolonged monitoring for detection of AF/PAF as cause of potential cardioembolic source.  ILR implantation for prolonged monitoring for detection of suspected, asymptomatic AF/PAF. Patient denied HA, lightheadedness, dizziness, changes in visions, cold like symptoms, CP, palpitation, SOB, abdominal pain, and n/v/d. The risk and benefits for each procedure were explained in detail which included but not limited to bleeding, infection, stroke, intubation, and death. Patient expressed understanding an all questions were answered. Patient expressed understanding an all questions were answered. Patient does not want a ILR at this time but may consider it in the future. Ep will be signing off and be contact [place] [phone] with any questions or concerns     Plan:  #CVA  - Continuous telemetric monitoring   - Monitor electrolytes and replete K to 4 and Mg to 2   - TTE revealed TTE no LVH, normal left ventricular systolic function, no segmental wall motion abnormalities, and normal right ventricular size and function. Full report above  - Patient will benefit from prolonged monitoring for detection of AF/PAF as cause of potential cardioembolic source.  ILR implantation for prolonged monitoring for detection of suspected, asymptomatic AF/PAF. Patient denied HA, lightheadedness, dizziness, changes in visions, cold like symptoms, CP, palpitation, SOB, abdominal pain, and n/v/d. The risk and benefits for each procedure were explained in detail which included but not limited to bleeding, infection, stroke, intubation, and death. Patient expressed understanding an all questions were answered. Patient does not want a ILR at this time but may consider it in the future.    [address] PA-C  Patient to be staffed with attending. Please await attending addendum

## 2021-03-19 ENCOUNTER — TRANSCRIPTION ENCOUNTER (OUTPATIENT)
Age: 71
End: 2021-03-19

## 2021-03-19 ENCOUNTER — INPATIENT (INPATIENT)
Facility: HOSPITAL | Age: 71
LOS: 17 days | Discharge: HOME | DRG: 949 | End: 2021-04-06
Attending: PHYSICAL MEDICINE & REHABILITATION | Admitting: PHYSICAL MEDICINE & REHABILITATION
Payer: COMMERCIAL

## 2021-03-19 VITALS
OXYGEN SATURATION: 96 % | DIASTOLIC BLOOD PRESSURE: 71 MMHG | HEART RATE: 98 BPM | RESPIRATION RATE: 16 BRPM | TEMPERATURE: 99 F | SYSTOLIC BLOOD PRESSURE: 167 MMHG | WEIGHT: 108.91 LBS

## 2021-03-19 VITALS
SYSTOLIC BLOOD PRESSURE: 158 MMHG | OXYGEN SATURATION: 98 % | RESPIRATION RATE: 16 BRPM | HEART RATE: 100 BPM | DIASTOLIC BLOOD PRESSURE: 80 MMHG

## 2021-03-19 DIAGNOSIS — I63.9 CEREBRAL INFARCTION, UNSPECIFIED: ICD-10-CM

## 2021-03-19 LAB
APPEARANCE UR: CLEAR — SIGNIFICANT CHANGE UP
BACTERIA # UR AUTO: NEGATIVE — SIGNIFICANT CHANGE UP
BILIRUB UR-MCNC: NEGATIVE — SIGNIFICANT CHANGE UP
COLOR SPEC: SIGNIFICANT CHANGE UP
CULTURE RESULTS: SIGNIFICANT CHANGE UP
CULTURE RESULTS: SIGNIFICANT CHANGE UP
DIFF PNL FLD: ABNORMAL
EPI CELLS # UR: 1 /HPF — SIGNIFICANT CHANGE UP (ref 0–5)
GLUCOSE BLDC GLUCOMTR-MCNC: 169 MG/DL — HIGH (ref 70–99)
GLUCOSE BLDC GLUCOMTR-MCNC: 194 MG/DL — HIGH (ref 70–99)
GLUCOSE BLDC GLUCOMTR-MCNC: 195 MG/DL — HIGH (ref 70–99)
GLUCOSE UR QL: ABNORMAL
KETONES UR-MCNC: NEGATIVE — SIGNIFICANT CHANGE UP
LEUKOCYTE ESTERASE UR-ACNC: NEGATIVE — SIGNIFICANT CHANGE UP
NITRITE UR-MCNC: NEGATIVE — SIGNIFICANT CHANGE UP
PH UR: 6.5 — SIGNIFICANT CHANGE UP (ref 5–8)
PROT UR-MCNC: ABNORMAL
RBC CASTS # UR COMP ASSIST: 1 /HPF — SIGNIFICANT CHANGE UP (ref 0–4)
SARS-COV-2 RNA SPEC QL NAA+PROBE: SIGNIFICANT CHANGE UP
SP GR SPEC: 1.01 — SIGNIFICANT CHANGE UP (ref 1.01–1.02)
SPECIMEN SOURCE: SIGNIFICANT CHANGE UP
SPECIMEN SOURCE: SIGNIFICANT CHANGE UP
UROBILINOGEN FLD QL: SIGNIFICANT CHANGE UP
WBC UR QL: 1 /HPF — SIGNIFICANT CHANGE UP (ref 0–5)

## 2021-03-19 PROCEDURE — 99239 HOSP IP/OBS DSCHRG MGMT >30: CPT

## 2021-03-19 PROCEDURE — 99232 SBSQ HOSP IP/OBS MODERATE 35: CPT

## 2021-03-19 RX ORDER — ATORVASTATIN CALCIUM 80 MG/1
80 TABLET, FILM COATED ORAL AT BEDTIME
Refills: 0 | Status: DISCONTINUED | OUTPATIENT
Start: 2021-03-19 | End: 2021-04-06

## 2021-03-19 RX ORDER — PANTOPRAZOLE SODIUM 20 MG/1
40 TABLET, DELAYED RELEASE ORAL
Refills: 0 | Status: DISCONTINUED | OUTPATIENT
Start: 2021-03-19 | End: 2021-03-28

## 2021-03-19 RX ORDER — GLUCAGON INJECTION, SOLUTION 0.5 MG/.1ML
1 INJECTION, SOLUTION SUBCUTANEOUS ONCE
Refills: 0 | Status: DISCONTINUED | OUTPATIENT
Start: 2021-03-19 | End: 2021-04-06

## 2021-03-19 RX ORDER — LANOLIN ALCOHOL/MO/W.PET/CERES
3 CREAM (GRAM) TOPICAL AT BEDTIME
Refills: 0 | Status: DISCONTINUED | OUTPATIENT
Start: 2021-03-19 | End: 2021-03-23

## 2021-03-19 RX ORDER — INSULIN LISPRO 100/ML
7 VIAL (ML) SUBCUTANEOUS
Refills: 0 | Status: DISCONTINUED | OUTPATIENT
Start: 2021-03-19 | End: 2021-03-24

## 2021-03-19 RX ORDER — METFORMIN HYDROCHLORIDE 850 MG/1
1 TABLET ORAL
Qty: 0 | Refills: 0 | DISCHARGE

## 2021-03-19 RX ORDER — DEXTROSE 50 % IN WATER 50 %
12.5 SYRINGE (ML) INTRAVENOUS ONCE
Refills: 0 | Status: DISCONTINUED | OUTPATIENT
Start: 2021-03-19 | End: 2021-04-06

## 2021-03-19 RX ORDER — INSULIN LISPRO 100/ML
VIAL (ML) SUBCUTANEOUS
Refills: 0 | Status: DISCONTINUED | OUTPATIENT
Start: 2021-03-19 | End: 2021-04-06

## 2021-03-19 RX ORDER — DEXTROSE 50 % IN WATER 50 %
25 SYRINGE (ML) INTRAVENOUS ONCE
Refills: 0 | Status: DISCONTINUED | OUTPATIENT
Start: 2021-03-19 | End: 2021-04-06

## 2021-03-19 RX ORDER — ASPIRIN/CALCIUM CARB/MAGNESIUM 324 MG
81 TABLET ORAL DAILY
Refills: 0 | Status: DISCONTINUED | OUTPATIENT
Start: 2021-03-19 | End: 2021-04-06

## 2021-03-19 RX ORDER — INSULIN LISPRO 100/ML
VIAL (ML) SUBCUTANEOUS AT BEDTIME
Refills: 0 | Status: DISCONTINUED | OUTPATIENT
Start: 2021-03-19 | End: 2021-04-06

## 2021-03-19 RX ORDER — SODIUM CHLORIDE 9 MG/ML
1000 INJECTION, SOLUTION INTRAVENOUS
Refills: 0 | Status: DISCONTINUED | OUTPATIENT
Start: 2021-03-19 | End: 2021-04-06

## 2021-03-19 RX ORDER — INSULIN LISPRO 100/ML
7 VIAL (ML) SUBCUTANEOUS
Qty: 0 | Refills: 0 | DISCHARGE
Start: 2021-03-19

## 2021-03-19 RX ORDER — SITAGLIPTIN 50 MG/1
1 TABLET, FILM COATED ORAL
Qty: 0 | Refills: 0 | DISCHARGE

## 2021-03-19 RX ORDER — SENNA PLUS 8.6 MG/1
2 TABLET ORAL AT BEDTIME
Refills: 0 | Status: DISCONTINUED | OUTPATIENT
Start: 2021-03-19 | End: 2021-04-06

## 2021-03-19 RX ORDER — GLIMEPIRIDE 1 MG
1 TABLET ORAL
Qty: 0 | Refills: 0 | DISCHARGE

## 2021-03-19 RX ORDER — DEXTROSE 50 % IN WATER 50 %
15 SYRINGE (ML) INTRAVENOUS ONCE
Refills: 0 | Status: DISCONTINUED | OUTPATIENT
Start: 2021-03-19 | End: 2021-04-06

## 2021-03-19 RX ORDER — INSULIN GLARGINE 100 [IU]/ML
18 INJECTION, SOLUTION SUBCUTANEOUS EVERY MORNING
Refills: 0 | Status: DISCONTINUED | OUTPATIENT
Start: 2021-03-19 | End: 2021-03-29

## 2021-03-19 RX ORDER — ACETAMINOPHEN 500 MG
650 TABLET ORAL EVERY 6 HOURS
Refills: 0 | Status: DISCONTINUED | OUTPATIENT
Start: 2021-03-19 | End: 2021-04-06

## 2021-03-19 RX ORDER — INSULIN GLARGINE 100 [IU]/ML
18 INJECTION, SOLUTION SUBCUTANEOUS
Qty: 0 | Refills: 0 | DISCHARGE
Start: 2021-03-19

## 2021-03-19 RX ORDER — NIFEDIPINE 30 MG
1 TABLET, EXTENDED RELEASE 24 HR ORAL
Qty: 0 | Refills: 0 | DISCHARGE
Start: 2021-03-19

## 2021-03-19 RX ORDER — LANOLIN ALCOHOL/MO/W.PET/CERES
1 CREAM (GRAM) TOPICAL
Qty: 0 | Refills: 0 | DISCHARGE
Start: 2021-03-19

## 2021-03-19 RX ORDER — HYDRALAZINE HCL 50 MG
1 TABLET ORAL
Qty: 0 | Refills: 0 | DISCHARGE

## 2021-03-19 RX ORDER — FENOFIBRATE,MICRONIZED 130 MG
48 CAPSULE ORAL DAILY
Refills: 0 | Status: DISCONTINUED | OUTPATIENT
Start: 2021-03-19 | End: 2021-03-28

## 2021-03-19 RX ORDER — NIFEDIPINE 30 MG
90 TABLET, EXTENDED RELEASE 24 HR ORAL DAILY
Refills: 0 | Status: DISCONTINUED | OUTPATIENT
Start: 2021-03-19 | End: 2021-04-06

## 2021-03-19 RX ORDER — HEPARIN SODIUM 5000 [USP'U]/ML
5000 INJECTION INTRAVENOUS; SUBCUTANEOUS EVERY 12 HOURS
Refills: 0 | Status: DISCONTINUED | OUTPATIENT
Start: 2021-03-19 | End: 2021-04-06

## 2021-03-19 RX ADMIN — Medication 1: at 22:31

## 2021-03-19 RX ADMIN — Medication 48 MILLIGRAM(S): at 10:02

## 2021-03-19 RX ADMIN — HEPARIN SODIUM 5000 UNIT(S): 5000 INJECTION INTRAVENOUS; SUBCUTANEOUS at 05:58

## 2021-03-19 RX ADMIN — HEPARIN SODIUM 5000 UNIT(S): 5000 INJECTION INTRAVENOUS; SUBCUTANEOUS at 22:22

## 2021-03-19 RX ADMIN — Medication 90 MILLIGRAM(S): at 22:40

## 2021-03-19 RX ADMIN — Medication 81 MILLIGRAM(S): at 10:01

## 2021-03-19 RX ADMIN — Medication 7 UNIT(S): at 12:48

## 2021-03-19 RX ADMIN — INSULIN GLARGINE 18 UNIT(S): 100 INJECTION, SOLUTION SUBCUTANEOUS at 10:02

## 2021-03-19 RX ADMIN — Medication 90 MILLIGRAM(S): at 05:58

## 2021-03-19 RX ADMIN — Medication 7 UNIT(S): at 22:30

## 2021-03-19 RX ADMIN — Medication 1: at 10:02

## 2021-03-19 RX ADMIN — Medication 7 UNIT(S): at 10:02

## 2021-03-19 RX ADMIN — Medication 3 MILLIGRAM(S): at 22:23

## 2021-03-19 RX ADMIN — ATORVASTATIN CALCIUM 80 MILLIGRAM(S): 80 TABLET, FILM COATED ORAL at 22:28

## 2021-03-19 RX ADMIN — Medication 1: at 12:48

## 2021-03-19 NOTE — PROGRESS NOTE ADULT - PROBLEM SELECTOR PLAN 2
Appears to be CKD 4, likely in setting of uncontrolled DM and HTN  - Avoid nephrotoxic agents, renally dose meds  - Renal US with small subcentimeter left angiomyolipoma- will need outpatient follow up  - Significant proteinuria noted on UA  - Check UA
Appears to be CKD 4, likely in setting of uncontrolled DM and HTN  - Avoid nephrotoxic agents, renally dose meds  - Renal US with small subcentimeter left angiomyolipoma- will need outpatient follow up  - Significant proteinuria noted on UA  - UA negative
Appears to be CKD 4, likely in setting of uncontrolled DM and HTN  - Avoid nephrotoxic agents, renally dose meds  - Renal US with small subcentimeter left angiomyolipoma- will need outpatient follow up  - Significant proteinuria noted on UA
Appears to be CKD 4, likely in setting of uncontrolled DM and HTN  - Avoid nephrotoxic agents, renally dose meds  - Renal US with small subcentimeter left angiomyolipoma- will need outpatient follow up  - Significant proteinuria noted on UA
no hx of kidney disease. Pt s/p 2L NS with improvement in Cr, ctm   - Avoid nephrotoxic agents   - Strict I/O
Mgmt per primary team
Appears to be CKD 4, likely in setting of uncontrolled DM and HTN  - Avoid nephrotoxic agents, renally dose meds  - Check renal US  - Significant proteinuria noted on UA  - Will need outpatient nephrology follow up

## 2021-03-19 NOTE — DISCHARGE NOTE PROVIDER - HOSPITAL COURSE
69 y/o woman with pmhx of CAD s/p stent 6 years ago, Dm2, ?CKD, HTN who presents to the ER with 1-2 days of generalized weakness with fall at home and slurred speech admitted for stroke.       1. CVA - Initial head CT negative for bleeding, mass effect, or shift.   Neurology evaluated patient and recommended MRI of head which showed "Abnormal T2 prolongation with restricted diffusion is seen involving the posterior limb of the right internal capsule as well as the right periatrial and right corona radiata region, compatible areas of acute infarcts".   Aspirin was started and aggressive statin used.   TTE was performed showing LVH without evidence of PFO.   ILR was offered but declined patient - advised to follow up as outpatient for holter monitor or ILR placement.   PT/OT/PMR recommend acute rehab.     2. BROCK - noted to have elevated creatinine which appears to be CKD 4 from uncontrolled DM and HTN.   Renal US with small subcentimeter left angiomyolipoma - outpatient follow up recommended  UA negative but + proteinuria    3. Diabetes -  A1C 14  Endocrine was called and followed patient - started on basal/bolus in hospital  Discharged on ....    4. Leukocytosis - received Ceftriaxone for possible UTI but UA negative  Monitor off abx without complication.     Problem/Plan - 5:  ·  Problem: Coronary artery disease involving native heart without angina pectoris, unspecified vessel or lesion type.  Plan: S/p stent placement 8 years ago. no chest pain reported, trops flat. EKG appears sinus rhythm.   - C/w ASA, statin, fenofibrate.     Problem/Plan - 6:  Problem: Hypertension, unspecified type. Plan: BP at goal  - C/w nifedipine 90mg q    On 3/19/2021 case discussed with Dr Mims and patient was cleared for discharge.

## 2021-03-19 NOTE — H&P ADULT - NSHPREVIEWOFSYSTEMS_GEN_ALL_CORE
REVIEW OF SYSTEMS  Constitutional: No fever, No Chills, No fatigue  HEENT: No eye pain, No visual disturbances, No difficulty hearing  Pulm: No cough,  No shortness of breath  Cardio: No chest pain, No palpitations  GI:  No abdominal pain, No nausea, No vomiting, No diarrhea, No constipation  : No dysuria, No frequency, No hematuria  Neuro: No headaches, No memory loss, No loss of strength, No numbness, No tremors  Skin: No itching, No rashes, No lesions   Endo: No temperature intolerance  MSK: No joint pain, No joint swelling, No muscle pain, No Neck or back pain  Psych:  No depression, No anxiety REVIEW OF SYSTEMS  Constitutional: No fever, No Chills, No fatigue  HEENT: No eye pain, No visual disturbances, No difficulty hearing  Pulm: No cough,  No shortness of breath  Cardio: No chest pain, No palpitations  GI:  No abdominal pain, No nausea, No vomiting, No diarrhea, No constipation  : No dysuria, No frequency, No hematuria  Neuro: No headaches, No memory loss, + loss of strength, No numbness, No tremors  Skin: No itching, No rashes, No lesions   Endo: No temperature intolerance  MSK: No joint pain, No joint swelling, No muscle pain, No Neck or back pain  Psych:  No depression, No anxiety

## 2021-03-19 NOTE — PROGRESS NOTE ADULT - PROBLEM SELECTOR PROBLEM 2
Hyperlipidemia, unspecified hyperlipidemia type
Hyperlipidemia, unspecified hyperlipidemia type
Acute kidney injury
Hypertension, unspecified type
Acute kidney injury

## 2021-03-19 NOTE — H&P ADULT - NSHPLABSRESULTS_GEN_ALL_CORE
135  |  101  |  35<H>  ----------------------------<  183<H>  4.2   |  24  |  2.21<H>    Ca    8.7      18 Mar 2021 06:44  Phos  5.0       Mg     2.2           Urinalysis Basic - ( 19 Mar 2021 04:04 )    Color: Light Yellow / Appearance: Clear / S.012 / pH: x  Gluc: x / Ketone: Negative  / Bili: Negative / Urobili: <2 mg/dL   Blood: x / Protein: 300 mg/dL / Nitrite: Negative   Leuk Esterase: Negative / RBC: 1 /HPF / WBC 1 /HPF   Sq Epi: x / Non Sq Epi: 1 /HPF / Bacteria: Negative     135  |  101  |  35<H>  ----------------------------<  183<H>  4.2   |  24  |  2.21<H>    Ca    8.7      18 Mar 2021 06:44  Phos  5.0       Mg     2.2           Urinalysis Basic - ( 19 Mar 2021 04:04 )    Color: Light Yellow / Appearance: Clear / S.012 / pH: x  Gluc: x / Ketone: Negative  / Bili: Negative / Urobili: <2 mg/dL   Blood: x / Protein: 300 mg/dL / Nitrite: Negative   Leuk Esterase: Negative / RBC: 1 /HPF / WBC 1 /HPF   Sq Epi: x / Non Sq Epi: 1 /HPF / Bacteria: Negative      < from: CT Head No Cont (21 @ 23:53) >  IMPRESSION:  No acute intracranial bleeding, mass effect, or shift.      < from: MR Head No Cont (21 @ 12:37) >  IMPRESSION: Acute infarcts as described above.  MRA of the neck appears normal  MRA of the [place] demonstrates suspected aneurysm involving the distal left internal carotid artery. CTA of the [place] can be done for further evaluation.  Short segment of stenosis seen involving the left PCA and right MCA arteries as described above.

## 2021-03-19 NOTE — DISCHARGE NOTE PROVIDER - NSDCMRMEDTOKEN_GEN_ALL_CORE_FT
atorvastatin 80 mg oral tablet: 1 tab(s) orally once a day  Ecotrin: 81 milligram(s) orally once a day  fenofibrate 48 mg oral tablet: 1 tab(s) orally once a day  glimepiride 4 mg oral tablet: 1 tab(s) orally once a day  hydrALAZINE 50 mg oral tablet: 1 tab(s) orally once a day  Januvia 100 mg oral tablet: 1 tab(s) orally once a day  metFORMIN 500 mg oral tablet, extended release: 1 tab(s) orally once a day   atorvastatin 80 mg oral tablet: 1 tab(s) orally once a day  Ecotrin: 81 milligram(s) orally once a day  fenofibrate 48 mg oral tablet: 1 tab(s) orally once a day  insulin glargine: 18 unit(s) subcutaneous once a day in the am  insulin lispro 100 units/mL injectable solution: 7 unit(s) injectable 3 times a day (with meals)  melatonin 3 mg oral tablet: 1 tab(s) orally once a day (at bedtime)  NIFEdipine 90 mg oral tablet, extended release: 1 tab(s) orally once a day

## 2021-03-19 NOTE — PROGRESS NOTE ADULT - PROBLEM SELECTOR PROBLEM 5
Coronary artery disease involving native heart without angina pectoris, unspecified vessel or lesion type

## 2021-03-19 NOTE — H&P ADULT - HISTORY OF PRESENT ILLNESS
71 y/o woman with pmhx of CAD s/p stent 6 years ago, Dm2, ?CKD, HTN who presents to the ER with 1-2 days of generalized weakness.  Reportedly per her son she had fallen and was slightly confused but with no LOC or head strike. She was found to have slurred speech at some point as well. She denies any headache or vision changes. Last normal per son around 10 PM friday. in the ER pt was given asa, ceftriaxone and 2L NS.   CT head revealed acute cva involving R internal capsule, R corona radiata.     Medically stable for transfer to [facility] on 3/19/2021 69 y/o woman with pmhx of CAD s/p stent 6 years ago, Dm2, ?CKD, HTN who presents to the ER with 1-2 days of generalized weakness.  Reportedly per her son she had fallen and was slightly confused but with no LOC or head strike. She was found to have slurred speech at some point as well. She denies any headache or vision changes. Last normal per son around 10 PM friday. in the ER pt was given asa, ceftriaxone and 2L NS.   CT head revealed acute cva involving R internal capsule, R corona radiata.     Medically stable for transfer to [facility] on 3/19/2021. Patient seen and examined at bedside, no complaints.

## 2021-03-19 NOTE — H&P ADULT - NSHPSOCIALHISTORY_GEN_ALL_CORE
SOCIAL HISTORY  Smoking - Denied  EtOH - Denied   Drugs - Denied    FUNCTIONAL HISTORY  Lives with  and son with 5 TRESSA, 1 flight of stairs to bedroom  Prior Level of Function: Independent in ADLs and ambulation    CURRENT FUNCTIONAL STATUS  - Bed Mobility: min A  - Transfers: min A  - Gait: min A  - ADLs:

## 2021-03-19 NOTE — H&P ADULT - NSHPPHYSICALEXAM_GEN_ALL_CORE
PHYSICAL EXAMINATION     General: NAD, Resting Comfortable,                                  HEENT: NC/AT, EOM I, PERRLA, Normal Conjunctivae  Cardio: RRR, Normal S1-S2, No M/G/R                              Pulm: No Respiratory Distress,  Lungs CTAB                        Abdomen: ND/NT, Soft, BS+                                                MSK: No joint swelling, Full ROM                                         Ext: No C/C/E, Pulses 2+ throughout, No calf tenderness    Skin:  all skin intact                                                                 Wounds: none  Decubitus Ulcers: None Present     Neurological Examination    Cognitive: AAO x 3                                                                         Attention: Intact   Judgment: Good evidence of judgement                               Memory: Recall 3 objects immediate and 3 min later      Mood/Affect: wnl                                                                           Communication:  Fluent,  No dysarthria   Swallow: intact  CN II - XII  intact  Coordination: FTN/HTS intact                                                                              Sensory: Intact to light touch, PP and Vibration                                                                                             Tone: normal Throughout   Balance: impaired    Motor          LEFT    UE - ShAB 4/5, EF 4/5, EE 4/5, WE 4/5,  4/5        RIGHT UE - ShAB 5/5, EF 5/5, EE 5/5, WE 5/5,  5/5        LEFT    LE - HF 4+/5, KE 4+/5, DF 5/5, PF 5/5        RIGHT LE - HF 5/5, KE 5/5, DF 5/5, PF 5/5     Reflex:  2 + thoroughout, Chavis/Babinski negative PHYSICAL EXAMINATION     General: NAD, Resting Comfortable,                                  HEENT: NC/AT, EOM I, PERRLA, Normal Conjunctivae  Cardio: RRR, Normal S1-S2, No M/G/R                              Pulm: No Respiratory Distress,  Lungs CTAB                        Abdomen: ND/NT, Soft, BS+                                                MSK: No joint swelling, Full ROM                                         Ext: No C/C/E, Pulses 2+ throughout, No calf tenderness    Skin:  all skin intact                                                                 Wounds: none  Decubitus Ulcers: None Present     Neurological Examination    Cognitive: AAO x 3                                                                         Attention: Intact   Judgment: Good evidence of judgement                                  Mood/Affect: wnl                                                                           Communication:  Fluent,  No dysarthria   Swallow: intact  CN II - XII  intact  Coordination: FTN/HTS intact                                                                              Sensory: Intact to light touch, PP and Vibration                                                                                             Tone: normal Throughout   Balance: impaired    Motor          LEFT    UE - ShAB 4/5, EF 4/5, EE 4/5, WE 4/5,  4/5        RIGHT UE - ShAB 5/5, EF 5/5, EE 5/5, WE 5/5,  5/5        LEFT    LE - HF 4+/5, KE 4+/5, DF 5/5, PF 5/5        RIGHT LE - HF 5/5, KE 5/5, DF 5/5, PF 5/5     Reflex:  2 + thoroughout, Chavis/Babinski negative

## 2021-03-19 NOTE — PROGRESS NOTE ADULT - PROBLEM SELECTOR PLAN 3
A1c 14%, patient noncompliant with PO meds at home per pt's   Endocrine consulted, appreciate recs  C/w Lantus 15u qAM, Admelog 7u qac  Continue with FS qac and qHS  Low dose ISS  Holding home oral diabetic medications   Unable to start ACEi/ARB for proteinuria due to BROCK/CKD 4  C/w atorvastatin  Check islet cell Ab, JOJO Ab, C-peptide
A1c 14%, patient noncompliant with PO meds at home per pt's   Endocrine consulted, appreciate recs  C/w Lantus 10u qHS, Admelog 1u qac  Continue with FS qac and qHS  Low dose ISS  Holding home oral diabetic medications   Unable to start ACEi/ARB for proteinuria due to BROCK/CKD 4  C/w atorvastatin  Check islet cell Ab, JOJO Ab, C-peptide  Will need to have patient's family come in for insulin teaching
A1c 14%, patient noncompliant with PO meds at home per pt's   Endocrine consulted, appreciate recs  C/w Lantus 15u qAM, Admelog 7u qac  Continue with FS qac and qHS  Low dose ISS  Holding home oral diabetic medications   Unable to start ACEi/ARB for proteinuria due to BROCK/CKD 4  C/w atorvastatin  Check islet cell Ab, JOJO Ab, C-peptide
A1c 14%, patient noncompliant with PO meds at home per pt's   Endocrine consulted, appreciate recs  Start Lantus 10u qHS, Admelog 1u qac  Continue with FS qac and qHS  Low dose ISS  Holding home oral diabetic medications   Unable to start ACEi/ARB for proteinuria due to BROCK/CKD 4  C/w atorvastatin  Check islet cell Ab, JOJO Ab, C-peptide  Will need insulin on discharge
DM2 c/b hyperglycemia, A1C 14, non-adherent to PO meds per   [ ]  Endocrine consult for new Insulin  - CDI,FS
c/w atorvastatin
A1c 14%, patient noncompliant with PO meds at home per pt's   Endocrine consulted, appreciate recs  C/w Lantus 18u qAM, Admelog 7u qac  Continue with FS qac and qHS  Low dose ISS  Holding home oral diabetic medications   Unable to start ACEi/ARB for proteinuria due to BROCK/CKD 4  C/w atorvastatin  F/u islet cell Ab, JOJO Ab

## 2021-03-19 NOTE — DISCHARGE NOTE PROVIDER - CARE PROVIDER_API CALL
Libman, Richard B (MD)  Neurology; Vascular Neurology  [address], [address]  [place] [address]  Phone: [phone]  Fax: [phone]  Follow Up Time:     Lillian Koch)  EndocrinologyMetabDiabetes  [address] [place] [address]  Phone: [phone]  Fax: [phone]  Follow Up Time:

## 2021-03-19 NOTE — H&P ADULT - ATTENDING COMMENTS
Patient seen and examined this AM. Begin ACUTE inpatient rehabilitation for the functional deficits consisting of 3 hours of therapy/day & 24 hour RN/daily PMR physician for comorbid medical management.    WBC improved.

## 2021-03-19 NOTE — PROGRESS NOTE ADULT - PROBLEM SELECTOR PROBLEM 3
Hypertension, unspecified type
Type 2 diabetes mellitus with hyperglycemia, without long-term current use of insulin
Hypertension, unspecified type
Type 2 diabetes mellitus with hyperglycemia, without long-term current use of insulin
Hyperlipidemia, unspecified hyperlipidemia type
Type 2 diabetes mellitus with hyperglycemia, without long-term current use of insulin

## 2021-03-19 NOTE — DISCHARGE NOTE PROVIDER - NSDCCPCAREPLAN_GEN_ALL_CORE_FT
PRINCIPAL DISCHARGE DIAGNOSIS  Diagnosis: Cerebrovascular accident (CVA), unspecified mechanism  Assessment and Plan of Treatment: You were found to have a stroke on the MRI that was performed during your admission.  Your information was sent to the [facility] to help set up a follow up appointment. Please continue your medications as presribed at time of discharge.      SECONDARY DISCHARGE DIAGNOSES  Diagnosis: DM (diabetes mellitus)  Assessment and Plan of Treatment: - Hypoglycemia Management : Please check your fingersticks every morning or if you are not feeling well.  If your fingerstick is >300 mg/dl x 3 or more readings, please contact your doctor. . If your fingerstick low, <70 mg/dl and/or you have symptoms of very low blood sugar, FIRST drink 1/2 cup of apple juice, (or take 4 glucose tabs/tube of glucose gel) and recheck fingerstick in 15 minutes.  Repeat these steps until blood sugar is above 100 mg/dl, IF NECESSARY.  Then call provider your doctor to discuss low blood sugar.    Diagnosis: CAD (coronary artery disease)  Assessment and Plan of Treatment: Continue medications as prescribed, do not stop unless instructed by your physician.  Continue low salt, fat, cholesterol and carbohydrate diet. Follow up with cardiologist and primary care physician's routine appointment.

## 2021-03-19 NOTE — PROGRESS NOTE ADULT - PROBLEM SELECTOR PROBLEM 4
Leukocytosis, unspecified type

## 2021-03-19 NOTE — PROGRESS NOTE ADULT - ATTENDING COMMENTS
Bess Greenfield (pager [phone])  On evenings and weekends, please call [phone] or page endocrine fellow on call.   Please note that this patient may be followed by different provider tomorrow. If no answer, contact endocrine fellow on call.
Lillian Valenzuela MD  Division of Endocrinology  Pager: [phone]    If after 6PM or before 9AM, or on weekends/holidays, please call endocrine answering service for assistance ([phone]).  For nonurgent matters email [email or web address] for assistance.
Patient declined ILR.  As per CRYSTAL AF patients with cryptogenic stroke benefit from ILR to identify AF either as the etiology of CVA or a epiphenomenon of the CVA. Either way, AF would  (AC as opposed to antiplatelet).
D/c planning to acute rehab
D/c planning to acute rehab

## 2021-03-19 NOTE — PROGRESS NOTE ADULT - PROBLEM SELECTOR PLAN 6
24h window for permissive HTN now past  - BP uncontrolled  - C/w nifedipine 30mg qd, titrate up as needed  - Goal reduction in SBP ~25% daily
24h window for permissive HTN now past  - BP very uncontrolled  - Start nifedipine 30mg qd, titrate up as needed  - Goal reduction in SBP ~25% daily
BP still elevated  - C/w nifedipine 60mg qd, titrate up as needed
Permissive HTN per Neuro recommendations   - Hold home Hydral
BP at goal  - C/w nifedipine 90mg qd
BP at goal  - C/w nifedipine 90mg qd

## 2021-03-19 NOTE — H&P ADULT - ASSESSMENT
ASSESSMENT/PLAN  71 y/o woman with pmhx of CAD s/p stent 6 years ago, Dm2, ?CKD, HTN, found to have R internal capsule, R corona radiata. Now with left hemiparesis, dysphasia.    COMORBIDITES/ACTIVE MEDICAL ISSUES     Gait Instability, ADL impairments and Functional impairments: start Comprehensive Rehab Program of PT/OT     #right CVA  -     #Pain control: Tylenol PRN, Oxycodone PRN    #GI/Bowel Mgmt -  Continent c/w Colace, Senna,  Dulcolax PRN, Miralax PRN,    #Bladder management  - COntinent, PVR q 8 hours (SC if > 400)    #DVT  - Heparin  - SCDs, TEDs     #Skin:  -No active issues at this time    FEN   - Diet - Regular + Thins  [CCHO, DASH/TLC]    - Dysphagia  SLP - evaluation and treatment    Precautions / PROPHYLAXIS:   - Falls, Cardiac, Sternal, Spinal, Seizure   - ortho: Weight bearing status: WBAT   - Lungs: Aspiration, Incentive Spirometer   - Pressure injury/Skin: Turn Q2hrs while in bed, OOB to Chair, PT/OT        MEDICAL PROGNOSIS: GOOD            REHAB POTENTIAL: GOOD             ESTIMATED DISPOSITION: HOME WITH HOME CARE            ELOS: 10-14 Days   EXPECTED THERAPY:     P.T. 1hr/day       O.T. 1hr/day      S.L.P. 1hr/day     P&O Unnecessary     EXP FREQUENCY: 5 days per 7 day period     PRESCREEN COMPARISION:   I have reviewed the prescreen information and I have found no relevant changes between the preadmission screening and my post admission evaluation     RATIONALE FOR INPATIENT ADMISSION - Patient demonstrates the following: (check all that apply)  [X] Medically appropriate for rehabilitation admission  [X] Has attainable rehab goals with an appropriate initial discharge plan  [X] Has rehabilitation potential (expected to make a significant improvement within a reasonable period of time)   [X] Requires close medical management by a rehab physician, rehab nursing care, Hospitalist and comprehensive interdisciplinary team (including PT, OT, & or SLP, Prosthetics and Orthotics)   ASSESSMENT/PLAN  71 y/o woman with pmhx of CAD s/p stent 6 years ago, Dm2, ?CKD, HTN, found to have R internal capsule, R corona radiata. Now with left hemiparesis, dysphasia.    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- will need outpatient follow up  - Avoid nephrotoxic agents    #DM A1C 14  - Continue with Lantus 18U  - CAMRON - 7U pre-prandial  - Holding home oral DM regimen  - Unable to start ACEI/ARB for proteinuria due to BROCK/CKD     #Pain control  - Tylenol PRN    #Insomnia  - melatonin 3mg     #GI/Bowel Mgmt   - Continent c/w Senna    #Bladder management  - COntinent, PVR q 8 hours (SC if > 400)    #DVT  - Heparin  - SCDs, TEDs     #Skin:  -No active issues at this time    FEN   - Diet - Dysphagia 2 Mechanical Soft-Thin Liquids; Consistent Carb/DASH    - Dysphagia  SLP - evaluation and treatment    Precautions / PROPHYLAXIS:   - Falls, Cardiac  - ortho: Weight bearing status: WBAT   - Lungs: Aspiration, Incentive Spirometer   - Pressure injury/Skin: Turn Q2hrs while in bed, OOB to Chair, PT/OT/SLP    Followups:  Libman, Richard B (MD)  Neurology; Vascular Neurology  [address], [address]  [place] [address]  Phone: [phone]  Fax: [phone]  Follow Up Time:     Lillian Koch)  EndocrinologyMetabDiabetes  [address] [place] [address]  Phone: [phone]  Fax: [phone]    MEDICAL PROGNOSIS: GOOD            REHAB POTENTIAL: GOOD             ESTIMATED DISPOSITION: HOME WITH HOME CARE            ELOS: 10-14 Days   EXPECTED THERAPY:     P.T. 1hr/day       O.T. 1hr/day      S.L.P. 1hr/day     P&O Unnecessary     EXP FREQUENCY: 5 days per 7 day period     PRESCREEN COMPARISION:   I have reviewed the prescreen information and I have found no relevant changes between the preadmission screening and my post admission evaluation     RATIONALE FOR INPATIENT ADMISSION - Patient demonstrates the following: (check all that apply)  [X] Medically appropriate for rehabilitation admission  [X] Has attainable rehab goals with an appropriate initial discharge plan  [X] Has rehabilitation potential (expected to make a significant improvement within a reasonable period of time)   [X] Requires close medical management by a rehab physician, rehab nursing care, Hospitalist and comprehensive interdisciplinary team (including PT, OT, & or SLP, Prosthetics and Orthotics)

## 2021-03-19 NOTE — PROGRESS NOTE ADULT - SUBJECTIVE AND OBJECTIVE BOX
Sherry Mims  [facility] Medicine  Pager #[phone]    Patient is a 70y old  Female who presents with a chief complaint of generalized weakness, cva (18 Mar 2021 16:17)      SUBJECTIVE / OVERNIGHT EVENTS: Patient seen and examined at bedside. Patient feeling well, she is grateful for the care she is receiving.     ADDITIONAL REVIEW OF SYSTEMS:    MEDICATIONS  (STANDING):  aspirin  chewable 81 milliGRAM(s) Oral daily  atorvastatin 80 milliGRAM(s) Oral at bedtime  dextrose 40% Gel 15 Gram(s) Oral once  dextrose 5%. 1000 milliLiter(s) (50 mL/Hr) IV Continuous <Continuous>  dextrose 5%. 1000 milliLiter(s) (100 mL/Hr) IV Continuous <Continuous>  dextrose 50% Injectable 25 Gram(s) IV Push once  dextrose 50% Injectable 12.5 Gram(s) IV Push once  dextrose 50% Injectable 25 Gram(s) IV Push once  fenofibrate Tablet 48 milliGRAM(s) Oral daily  glucagon  Injectable 1 milliGRAM(s) IntraMuscular once  heparin   Injectable 5000 Unit(s) SubCutaneous every 12 hours  insulin glargine Injectable (LANTUS) 18 Unit(s) SubCutaneous <User Schedule>  insulin lispro (ADMELOG) corrective regimen sliding scale   SubCutaneous three times a day before meals  insulin lispro (ADMELOG) corrective regimen sliding scale   SubCutaneous at bedtime  insulin lispro Injectable (ADMELOG) 7 Unit(s) SubCutaneous three times a day before meals  melatonin 3 milliGRAM(s) Oral at bedtime  NIFEdipine XL 90 milliGRAM(s) Oral daily    MEDICATIONS  (PRN):      CAPILLARY BLOOD GLUCOSE      POCT Blood Glucose.: 169 mg/dL (19 Mar 2021 08:27)  POCT Blood Glucose.: 160 mg/dL (18 Mar 2021 21:58)  POCT Blood Glucose.: 170 mg/dL (18 Mar 2021 16:53)  POCT Blood Glucose.: 244 mg/dL (18 Mar 2021 12:28)    I&O's Summary    18 Mar 2021 07:01  -  19 Mar 2021 07:00  --------------------------------------------------------  IN: 720 mL / OUT: 700 mL / NET: 20 mL        PHYSICAL EXAM:    Vital Signs Last 24 Hrs  T(C): 36.9 (19 Mar 2021 05:57), Max: 37 (18 Mar 2021 21:47)  T(F): 98.5 (19 Mar 2021 05:57), Max: 98.6 (18 Mar 2021 21:47)  HR: 94 (19 Mar 2021 05:57) (94 - 100)  BP: 151/74 (19 Mar 2021 05:57) (133/61 - 151/74)  BP(mean): --  RR: 16 (19 Mar 2021 05:57) (16 - 17)  SpO2: 100% (19 Mar 2021 05:57) (98% - 100%)    CONSTITUTIONAL: NAD, well-developed, well-groomed  EYES: Conjunctiva and sclera clear  ENMT: Moist oral mucosa  RESPIRATORY: Normal respiratory effort; lungs are clear to auscultation bilaterally  CARDIOVASCULAR: Regular rate and rhythm, normal S1 and S2, no murmur/rub/gallop; No lower extremity edema  ABDOMEN: Nontender to palpation, normoactive bowel sounds  MUSCULOSKELETAL: No clubbing or cyanosis of digits  PSYCH: A+O to person, place, and time; affect appropriate  NEUROLOGY: 4/5 strength LUE, 5/5 strength other extremities  SKIN: No rashes; no palpable lesions    LABS:                        11.3   12.19 )-----------( 381      ( 18 Mar 2021 06:44 )             34.1     03-18    135  |  101  |  35<H>  ----------------------------<  183<H>  4.2   |  24  |  2.21<H>    Ca    8.7      18 Mar 2021 06:44  Phos  5.0     03-18  Mg     2.2     03-18      Urinalysis Basic - ( 19 Mar 2021 04:04 )    Color: Light Yellow / Appearance: Clear / S.012 / pH: x  Gluc: x / Ketone: Negative  / Bili: Negative / Urobili: <2 mg/dL   Blood: x / Protein: 300 mg/dL / Nitrite: Negative   Leuk Esterase: Negative / RBC: 1 /HPF / WBC 1 /HPF   Sq Epi: x / Non Sq Epi: 1 /HPF / Bacteria: Negative      RADIOLOGY & ADDITIONAL TESTS: No new imaging  Results Reviewed: Yes  Imaging Personally Reviewed:  Electrocardiogram Personally Reviewed:    COORDINATION OF CARE:  Care Discussed with Consultants/Other Providers [Y/N]:  Prior or Outpatient Records Reviewed [Y/N]:

## 2021-03-19 NOTE — PROGRESS NOTE ADULT - PROBLEM SELECTOR PROBLEM 1
Cerebrovascular accident (CVA), unspecified mechanism
Type 2 diabetes mellitus with hyperglycemia, without long-term current use of insulin
Cerebrovascular accident (CVA), unspecified mechanism
Type 2 diabetes mellitus with hyperglycemia, without long-term current use of insulin
Cerebrovascular accident (CVA), unspecified mechanism
Cerebrovascular accident (CVA), unspecified mechanism
Type 2 diabetes mellitus with hyperglycemia, without long-term current use of insulin
Weakness
Cerebrovascular accident (CVA), unspecified mechanism

## 2021-03-19 NOTE — PROGRESS NOTE ADULT - PROBLEM SELECTOR PLAN 5
S/p stent placement 8 years ago. no chest pain reported, trops flat. EKG appears sinus rhythm.   - C/w ASA, statin, fenofibrate
S/p stent placement 8 years ago. no chest pain reported, trops flat. EKG appears sinus rhythm.   - C/w ASA, statin, fenofibrate
S/p stent placement 8 years ago. no chest pain reported, trops flat. EKG appears sinus rhythm.   - ASA, statin, fenofibrate  -check echo as above, monitor on telemetry  - Cont ASA, statin
S/p stent placement 8 years ago. no chest pain reported, trops flat. EKG appears sinus rhythm.   - C/w ASA, statin, fenofibrate
S/p stent placement 8 years ago. no chest pain reported, trops flat. EKG appears sinus rhythm.   - C/w ASA, statin, fenofibrate
S/p stent placement 8 years ago. no chest pain reported, trops flat. EKG appears sinus rhythm.   - C/w ASA, statin, fenofibrate  - F/u TTE

## 2021-03-19 NOTE — PROGRESS NOTE ADULT - PROVIDER SPECIALTY LIST ADULT
Electrophysiology
Endocrinology
Endocrinology
Hospitalist
Neurology
Rehab Medicine
Endocrinology
Hospitalist

## 2021-03-19 NOTE — PROGRESS NOTE ADULT - PROBLEM SELECTOR PLAN 7
DVT ppx: HSQ  Diet: Consistent carb, DASH  Dispo: Likely acute rehab
DVT ppx: HSQ  Diet: Consistent carb, DASH  Dispo: Acute rehab- patient agreeable
heparin subcu
DVT ppx: HSQ  Diet: Consistent carb, DASH  Dispo: Acute rehab- patient agreeable

## 2021-03-19 NOTE — PROGRESS NOTE ADULT - PROBLEM SELECTOR PLAN 1
L facial droop, L sided weakness with R gaze deviation and dysarthria. MRI demonstrating "Abnormal T2 prolongation with restricted diffusion is seen involving the posterior limb of the right internal capsule as well as the right periatrial and right corona radiata region, compatible areas of acute infarcts".   - ASA, statin   [ ] TTE, monitor on tele  - Permissive HTN x 24 h   [ ] PT/OT/SLP
MRI demonstrating "Abnormal T2 prolongation with restricted diffusion is seen involving the posterior limb of the right internal capsule as well as the right periatrial and right corona radiata region, compatible areas of acute infarcts".   - C/w ASA, statin   - Monitor on telemetry  - TTE with LVH  - Appreciate neurology recs- patient will need Holter vs loop recorder  - EP consulted  - PM&R recommending acute rehab
MRI demonstrating "Abnormal T2 prolongation with restricted diffusion is seen involving the posterior limb of the right internal capsule as well as the right periatrial and right corona radiata region, compatible areas of acute infarcts".   - C/w ASA, statin   - Monitor on telemetry  - TTE with LVH  - S&S eval done- rec mechanical soft with thin liquids  - Appreciate neurology recs- patient will need Holter vs loop recorder  - PM&R recommending acute rehab  - OT consult  - Neuro checks
While inpatient CHO diet and FS AC and HS  Goal Glucose 100-180  Recommend Lantus 12 Units HS and start Admelog 4 Units TIDAC plus low scale before meals and at bedtime  Please check Islet cell and JOJO Ab to r/o BO as well as C-peptide  Recommend basal/bolus regimen on discharge with discontinuation of orals  Patient is requesting SW consult for visiting nurse  Patient's family will require insulin pen teaching  Nutrition consult  Pt can f/u at [address] [place] [address] [phone].
MRI demonstrating "Abnormal T2 prolongation with restricted diffusion is seen involving the posterior limb of the right internal capsule as well as the right periatrial and right corona radiata region, compatible areas of acute infarcts".   - C/w ASA, statin   - Monitor on telemetry  - TTE with [facility]  - Appreciate neurology recs- patient will need Holter vs loop recorder- patient currently refusing placement while inpatient  - EP following  - PM&R recommending acute rehab
MRI demonstrating "Abnormal T2 prolongation with restricted diffusion is seen involving the posterior limb of the right internal capsule as well as the right periatrial and right corona radiata region, compatible areas of acute infarcts".   - C/w ASA, statin   - Monitor on telemetry  - TTE with LV[facility]  - Appreciate neurology recs- patient will need Holter vs loop recorder- currently refusing  - EP following  - PM&R recommending acute rehab
MRI demonstrating "Abnormal T2 prolongation with restricted diffusion is seen involving the posterior limb of the right internal capsule as well as the right periatrial and right corona radiata region, compatible areas of acute infarcts".   - C/w ASA, statin   - Monitor on telemetry  - TTE done today- waiting for official read  - S&S eval done- rec mechanical soft with thin liquids  - Appreciate neurology recs- patient will need Holter vs loop recorder  - PM&R recommending acute rehab  - OT consult  - Neuro checks

## 2021-03-19 NOTE — PROGRESS NOTE ADULT - SUBJECTIVE AND OBJECTIVE BOX
Patient is a 70y old  Female who presents with a chief complaint of generalized weakness, cva (19 Mar 2021 10:20)      HPI:  Pt is a 69 y/o woman with pmhx of CAD s/p stent 6 years ago, Dm2, ?CKD, HTN who presents to the ER with 1-2 days of generalized weakness. Pt reports she woke up this AM very weak all over but could still walk. No new changes in meds. She denied any fevers but had chills. no cough, chest pain, palpitations, shortness of breath, abd pain, dysuria or diarrhea. Reportedly per her son she had fallen and was slightly confused but with no LOC or head strike. She was found to have slurred speech at some point as well. She denies any headache or vision changes. Last normal per son around 10 PM friday. in the ER pt was given asa, ceftriaxone and 2L NS.   (14 Mar 2021 05:43)    Pt reports regular bm. denies pain or other complaint however states she does not sleep well in the hospital.      REVIEW OF SYSTEMS  Constitutional - No fever, No weight loss, No fatigue  HEENT - No eye pain, No visual disturbances, No difficulty hearing, No tinnitus, No vertigo, No neck pain  Respiratory - No cough, No wheezing, No shortness of breath  Cardiovascular - No chest pain, No palpitations  Gastrointestinal - No abdominal pain, No nausea, No vomiting, No diarrhea, No constipation  Genitourinary - No dysuria, No frequency, No hematuria, No incontinence  Neurological - No headaches, No memory loss, + loss of strength, No numbness, No tremors  Skin - No itching, No rashes, No lesions   Endocrine - No temperature intolerance  Musculoskeletal - No joint pain, No joint swelling, No muscle pain  Psychiatric - No depression, No anxiety    PAST MEDICAL & SURGICAL HISTORY  Hypertension    DM (diabetes mellitus)    CAD (coronary artery disease)    No significant past surgical history         CURRENT FUNCTIONAL STATUS  BM min  T min  gait: 4 lateral side steps min x 2    FAMILY HISTORY   No pertinent family history in first degree relatives        RECENT LABS/IMAGING  CBC Full  -  ( 18 Mar 2021 06:44 )  WBC Count : 12.19 K/uL  RBC Count : 4.18 M/uL  Hemoglobin : 11.3 g/dL  Hematocrit : 34.1 %  Platelet Count - Automated : 381 K/uL  Mean Cell Volume : 81.6 fL  Mean Cell Hemoglobin : 27.0 pg  Mean Cell Hemoglobin Concentration : 33.1 gm/dL  Auto Neutrophil # : x  Auto Lymphocyte # : x  Auto Monocyte # : x  Auto Eosinophil # : x  Auto Basophil # : x  Auto Neutrophil % : x  Auto Lymphocyte % : x  Auto Monocyte % : x  Auto Eosinophil % : x  Auto Basophil % : x        135  |  101  |  35<H>  ----------------------------<  183<H>  4.2   |  24  |  2.21<H>    Ca    8.7      18 Mar 2021 06:44  Phos  5.0       Mg     2.2           Urinalysis Basic - ( 19 Mar 2021 04:04 )    Color: Light Yellow / Appearance: Clear / S.012 / pH: x  Gluc: x / Ketone: Negative  / Bili: Negative / Urobili: <2 mg/dL   Blood: x / Protein: 300 mg/dL / Nitrite: Negative   Leuk Esterase: Negative / RBC: 1 /HPF / WBC 1 /HPF   Sq Epi: x / Non Sq Epi: 1 /HPF / Bacteria: Negative        VITALS  T(C): 36.9 (21 @ 05:57), Max: 37 (21 @ 21:47)  HR: 94 (21 @ 05:57) (94 - 100)  BP: 151/74 (21 @ 05:57) (133/61 - 151/74)  RR: 16 (21 @ 05:57) (16 - 17)  SpO2: 100% (21 @ 05:57) (98% - 100%)  Wt(kg): --    ALLERGIES  No Known Allergies      MEDICATIONS   aspirin  chewable 81 milliGRAM(s) Oral daily  atorvastatin 80 milliGRAM(s) Oral at bedtime  dextrose 40% Gel 15 Gram(s) Oral once  dextrose 5%. 1000 milliLiter(s) IV Continuous <Continuous>  dextrose 5%. 1000 milliLiter(s) IV Continuous <Continuous>  dextrose 50% Injectable 25 Gram(s) IV Push once  dextrose 50% Injectable 12.5 Gram(s) IV Push once  dextrose 50% Injectable 25 Gram(s) IV Push once  fenofibrate Tablet 48 milliGRAM(s) Oral daily  glucagon  Injectable 1 milliGRAM(s) IntraMuscular once  heparin   Injectable 5000 Unit(s) SubCutaneous every 12 hours  insulin glargine Injectable (LANTUS) 18 Unit(s) SubCutaneous <User Schedule>  insulin lispro (ADMELOG) corrective regimen sliding scale   SubCutaneous three times a day before meals  insulin lispro (ADMELOG) corrective regimen sliding scale   SubCutaneous at bedtime  insulin lispro Injectable (ADMELOG) 7 Unit(s) SubCutaneous three times a day before meals  melatonin 3 milliGRAM(s) Oral at bedtime  NIFEdipine XL 90 milliGRAM(s) Oral daily      ----------------------------------------------------------------------------------------   PHYSICAL EXAM  Constitutional - NAD, Comfortable  HEENT - NCAT, EOMI  Neck - Supple, No limited ROM  Chest - no respiratory distress  Cardiovascular - RRR, S1S2   Abdomen -  Soft, NTND  Extremities - No C/C/E, No calf tenderness   Neurologic Exam -                    Cognitive - Awake, Alert, AAO to self, place, date, year, situation     Communication - Fluent, + mild dysarthria     Cranial Nerves - decreased nasolabial fold on the L     Motor - No focal deficits                    LEFT    UE - ShAB 4/5, EF 4/5, EE 4/5, WE 4/5,  4/5                    RIGHT UE - ShAB 5/5, EF 5/5, EE 5/5, WE 5/5,  5/5                    LEFT    LE - HF 4+/5, KE 4+/5, DF 5/5, PF 5/5                    RIGHT LE - HF 5/5, KE 5/5, DF 5/5, PF 5/5        Sensory - Intact to LT       Balance - WNL Static  Psychiatric - Mood stable, Affect WNL  ----------------------------------------------------------------------------------------  ASSESSMENT/PLAN  71 yo f pmh cad s/p stent, dm2 presented with L sided weakness and impaired coordination, found to have acute cva   on asa, lipitor  insomnia: on melatonin  htn: nifedipine  DVT PPX - heparin  Diet: dysphagia 2  consistent carb dash/tlc  continue bedside PT and OT  SLP  Rehab -    Recommend ACUTE inpatient rehabilitation for the functional deficits consisting of 3 hours of therapy/day & 24 hour RN/daily PMR physician for comorbid medical management. Will continue to follow for ongoing rehab needs and recommendations.      Patient is a 70y old  Female who presents with a chief complaint of generalized weakness, cva (19 Mar 2021 10:20)      HPI:  Pt is a 71 y/o woman with pmhx of CAD s/p stent 6 years ago, Dm2, ?CKD, HTN who presents to the ER with 1-2 days of generalized weakness. Pt reports she woke up this AM very weak all over but could still walk. No new changes in meds. She denied any fevers but had chills. no cough, chest pain, palpitations, shortness of breath, abd pain, dysuria or diarrhea. Reportedly per her son she had fallen and was slightly confused but with no LOC or head strike. She was found to have slurred speech at some point as well. She denies any headache or vision changes. Last normal per son around 10 PM friday. in the ER pt was given asa, ceftriaxone and 2L NS.   (14 Mar 2021 05:43)    Pt reports regular bm. denies pain or other complaint however states she does not sleep well in the hospital.      REVIEW OF SYSTEMS  Constitutional - No fever, No weight loss, No fatigue  HEENT - No eye pain, No visual disturbances, No difficulty hearing, No tinnitus, No vertigo, No neck pain  Respiratory - No cough, No wheezing, No shortness of breath  Cardiovascular - No chest pain, No palpitations  Gastrointestinal - No abdominal pain, No nausea, No vomiting, No diarrhea, No constipation  Genitourinary - No dysuria, No frequency, No hematuria, No incontinence  Neurological - No headaches, No memory loss, + loss of strength, No numbness, No tremors  Skin - No itching, No rashes, No lesions   Endocrine - No temperature intolerance  Musculoskeletal - No joint pain, No joint swelling, No muscle pain  Psychiatric - No depression, No anxiety    PAST MEDICAL & SURGICAL HISTORY  Hypertension    DM (diabetes mellitus)    CAD (coronary artery disease)    No significant past surgical history         CURRENT FUNCTIONAL STATUS  BM min  T min  gait: 4 lateral side steps min x 2    FAMILY HISTORY   No pertinent family history in first degree relatives        RECENT LABS/IMAGING  CBC Full  -  ( 18 Mar 2021 06:44 )  WBC Count : 12.19 K/uL  RBC Count : 4.18 M/uL  Hemoglobin : 11.3 g/dL  Hematocrit : 34.1 %  Platelet Count - Automated : 381 K/uL  Mean Cell Volume : 81.6 fL  Mean Cell Hemoglobin : 27.0 pg  Mean Cell Hemoglobin Concentration : 33.1 gm/dL  Auto Neutrophil # : x  Auto Lymphocyte # : x  Auto Monocyte # : x  Auto Eosinophil # : x  Auto Basophil # : x  Auto Neutrophil % : x  Auto Lymphocyte % : x  Auto Monocyte % : x  Auto Eosinophil % : x  Auto Basophil % : x        135  |  101  |  35<H>  ----------------------------<  183<H>  4.2   |  24  |  2.21<H>    Ca    8.7      18 Mar 2021 06:44  Phos  5.0       Mg     2.2           Urinalysis Basic - ( 19 Mar 2021 04:04 )    Color: Light Yellow / Appearance: Clear / S.012 / pH: x  Gluc: x / Ketone: Negative  / Bili: Negative / Urobili: <2 mg/dL   Blood: x / Protein: 300 mg/dL / Nitrite: Negative   Leuk Esterase: Negative / RBC: 1 /HPF / WBC 1 /HPF   Sq Epi: x / Non Sq Epi: 1 /HPF / Bacteria: Negative        VITALS  T(C): 36.9 (21 @ 05:57), Max: 37 (21 @ 21:47)  HR: 94 (21 @ 05:57) (94 - 100)  BP: 151/74 (21 @ 05:57) (133/61 - 151/74)  RR: 16 (21 @ 05:57) (16 - 17)  SpO2: 100% (21 @ 05:57) (98% - 100%)  Wt(kg): --    ALLERGIES  No Known Allergies      MEDICATIONS   aspirin  chewable 81 milliGRAM(s) Oral daily  atorvastatin 80 milliGRAM(s) Oral at bedtime  dextrose 40% Gel 15 Gram(s) Oral once  dextrose 5%. 1000 milliLiter(s) IV Continuous <Continuous>  dextrose 5%. 1000 milliLiter(s) IV Continuous <Continuous>  dextrose 50% Injectable 25 Gram(s) IV Push once  dextrose 50% Injectable 12.5 Gram(s) IV Push once  dextrose 50% Injectable 25 Gram(s) IV Push once  fenofibrate Tablet 48 milliGRAM(s) Oral daily  glucagon  Injectable 1 milliGRAM(s) IntraMuscular once  heparin   Injectable 5000 Unit(s) SubCutaneous every 12 hours  insulin glargine Injectable (LANTUS) 18 Unit(s) SubCutaneous <User Schedule>  insulin lispro (ADMELOG) corrective regimen sliding scale   SubCutaneous three times a day before meals  insulin lispro (ADMELOG) corrective regimen sliding scale   SubCutaneous at bedtime  insulin lispro Injectable (ADMELOG) 7 Unit(s) SubCutaneous three times a day before meals  melatonin 3 milliGRAM(s) Oral at bedtime  NIFEdipine XL 90 milliGRAM(s) Oral daily      ----------------------------------------------------------------------------------------   PHYSICAL EXAM  Constitutional - NAD, Comfortable  HEENT - NCAT, EOMI  Neck - Supple, No limited ROM  Chest - no respiratory distress  Cardiovascular - mildly tachy, S1S2   Abdomen -  Soft, NTND  Extremities - No C/C/E, No calf tenderness   Neurologic Exam -                    Cognitive - Awake, Alert, AAO to self, place, date, year, situation     Communication - Fluent, + mild dysarthria     Cranial Nerves - decreased nasolabial fold on the L     Motor - No focal deficits                    LEFT    UE - ShAB 4/5, EF 4/5, EE 4/5, WE 4/5,  4/5                    RIGHT UE - ShAB 5/5, EF 5/5, EE 5/5, WE 5/5,  5/5                    LEFT    LE - HF 4+/5, KE 4+/5, DF 5/5, PF 5/5                    RIGHT LE - HF 5/5, KE 5/5, DF 5/5, PF 5/5        Sensory - Intact to LT       Balance - WNL Static  Psychiatric - Mood stable, Affect WNL  ----------------------------------------------------------------------------------------  ASSESSMENT/PLAN  69 yo f pmh cad s/p stent, dm2 presented with L sided weakness and impaired coordination, found to have acute cva   on asa, lipitor  mild leukocytosis, mild tachycardia- patient denies complaint. continue to monitor  insomnia: on melatonin  htn: nifedipine  DVT PPX - heparin  Diet: dysphagia 2  consistent carb dash/tlc  continue bedside PT and OT  SLP  Rehab -    Recommend ACUTE inpatient rehabilitation for the functional deficits consisting of 3 hours of therapy/day & 24 hour RN/daily PMR physician for comorbid medical management. Will continue to follow for ongoing rehab needs and recommendations.

## 2021-03-19 NOTE — PROGRESS NOTE ADULT - PROBLEM SELECTOR PLAN 4
Mild, possibly reactive. Pt received ceftriaxone in the ER empirically. UA with no leuks nor nitrites. CXR appears clear. Pt otherwise afebrile  - Check UA
Improved, possibly reactive. Pt received ceftriaxone in the ER empirically. UA with no leuks nor nitrites. CXR appears clear. Pt otherwise afebrile  -no clear sign of infection, hold off further antibiotics for now.
Resolved, possibly reactive. Pt received ceftriaxone in the ER empirically. UA with no leuks nor nitrites. CXR appears clear. Pt otherwise afebrile  -no clear sign of infection, hold off further antibiotics for now
Mild, possibly reactive. Pt received ceftriaxone in the ER empirically. UA with no leuks nor nitrites. CXR appears clear. Pt otherwise afebrile  - UA negative

## 2021-03-19 NOTE — DISCHARGE NOTE NURSING/CASE MANAGEMENT/SOCIAL WORK - PATIENT PORTAL LINK FT
You can access the FollowMyHealth Patient Portal offered by [facility] by registering at the following website: [email or web address]. By joining [email or web address]’s FollowMyHealth portal, you will also be able to view your health information using other applications (apps) compatible with our system.

## 2021-03-19 NOTE — PROGRESS NOTE ADULT - REASON FOR ADMISSION
generalized weakness, cva

## 2021-03-19 NOTE — DISCHARGE NOTE PROVIDER - CARE PROVIDERS DIRECT ADDRESSES
[email or web address]

## 2021-03-20 PROBLEM — I25.10 ATHEROSCLEROTIC HEART DISEASE OF NATIVE CORONARY ARTERY WITHOUT ANGINA PECTORIS: Chronic | Status: ACTIVE | Noted: 2021-03-13

## 2021-03-20 PROBLEM — I10 ESSENTIAL (PRIMARY) HYPERTENSION: Chronic | Status: ACTIVE | Noted: 2021-03-14

## 2021-03-20 PROBLEM — E11.9 TYPE 2 DIABETES MELLITUS WITHOUT COMPLICATIONS: Chronic | Status: ACTIVE | Noted: 2021-03-13

## 2021-03-20 LAB
ALBUMIN SERPL ELPH-MCNC: 2.1 G/DL — LOW (ref 3.3–5)
ALP SERPL-CCNC: 68 U/L — SIGNIFICANT CHANGE UP (ref 40–120)
ALT FLD-CCNC: 79 U/L — HIGH (ref 10–45)
ANION GAP SERPL CALC-SCNC: 8 MMOL/L — SIGNIFICANT CHANGE UP (ref 5–17)
AST SERPL-CCNC: 71 U/L — HIGH (ref 10–40)
BASOPHILS # BLD AUTO: 0.04 K/UL — SIGNIFICANT CHANGE UP (ref 0–0.2)
BASOPHILS NFR BLD AUTO: 0.4 % — SIGNIFICANT CHANGE UP (ref 0–2)
BILIRUB SERPL-MCNC: 0.2 MG/DL — SIGNIFICANT CHANGE UP (ref 0.2–1.2)
BUN SERPL-MCNC: 41 MG/DL — HIGH (ref 7–23)
CALCIUM SERPL-MCNC: 8.2 MG/DL — LOW (ref 8.4–10.5)
CHLORIDE SERPL-SCNC: 104 MMOL/L — SIGNIFICANT CHANGE UP (ref 96–108)
CO2 SERPL-SCNC: 26 MMOL/L — SIGNIFICANT CHANGE UP (ref 22–31)
CREAT SERPL-MCNC: 2.13 MG/DL — HIGH (ref 0.5–1.3)
EOSINOPHIL # BLD AUTO: 0.76 K/UL — HIGH (ref 0–0.5)
EOSINOPHIL NFR BLD AUTO: 7.1 % — HIGH (ref 0–6)
GAD65 AB SER-MCNC: 0 NMOL/L — SIGNIFICANT CHANGE UP
GLUCOSE BLDC GLUCOMTR-MCNC: 160 MG/DL — HIGH (ref 70–99)
GLUCOSE BLDC GLUCOMTR-MCNC: 182 MG/DL — HIGH (ref 70–99)
GLUCOSE BLDC GLUCOMTR-MCNC: 186 MG/DL — HIGH (ref 70–99)
GLUCOSE BLDC GLUCOMTR-MCNC: 282 MG/DL — HIGH (ref 70–99)
GLUCOSE BLDC GLUCOMTR-MCNC: 55 MG/DL — LOW (ref 70–99)
GLUCOSE BLDC GLUCOMTR-MCNC: 57 MG/DL — LOW (ref 70–99)
GLUCOSE BLDC GLUCOMTR-MCNC: 76 MG/DL — SIGNIFICANT CHANGE UP (ref 70–99)
GLUCOSE BLDC GLUCOMTR-MCNC: 86 MG/DL — SIGNIFICANT CHANGE UP (ref 70–99)
GLUCOSE SERPL-MCNC: 136 MG/DL — HIGH (ref 70–99)
HCT VFR BLD CALC: 37.2 % — SIGNIFICANT CHANGE UP (ref 34.5–45)
HGB BLD-MCNC: 12.1 G/DL — SIGNIFICANT CHANGE UP (ref 11.5–15.5)
IMM GRANULOCYTES NFR BLD AUTO: 0.8 % — SIGNIFICANT CHANGE UP (ref 0–1.5)
ISLET CELL512 AB SER-SCNC: 0 NMOL/L — SIGNIFICANT CHANGE UP
LYMPHOCYTES # BLD AUTO: 2.61 K/UL — SIGNIFICANT CHANGE UP (ref 1–3.3)
LYMPHOCYTES # BLD AUTO: 24.6 % — SIGNIFICANT CHANGE UP (ref 13–44)
MCHC RBC-ENTMCNC: 27.6 PG — SIGNIFICANT CHANGE UP (ref 27–34)
MCHC RBC-ENTMCNC: 32.5 GM/DL — SIGNIFICANT CHANGE UP (ref 32–36)
MCV RBC AUTO: 84.7 FL — SIGNIFICANT CHANGE UP (ref 80–100)
MONOCYTES # BLD AUTO: 1 K/UL — HIGH (ref 0–0.9)
MONOCYTES NFR BLD AUTO: 9.4 % — SIGNIFICANT CHANGE UP (ref 2–14)
NEUTROPHILS # BLD AUTO: 6.14 K/UL — SIGNIFICANT CHANGE UP (ref 1.8–7.4)
NEUTROPHILS NFR BLD AUTO: 57.7 % — SIGNIFICANT CHANGE UP (ref 43–77)
NRBC # BLD: 0 /100 WBCS — SIGNIFICANT CHANGE UP (ref 0–0)
PLATELET # BLD AUTO: 387 K/UL — SIGNIFICANT CHANGE UP (ref 150–400)
POTASSIUM SERPL-MCNC: 4.4 MMOL/L — SIGNIFICANT CHANGE UP (ref 3.5–5.3)
POTASSIUM SERPL-SCNC: 4.4 MMOL/L — SIGNIFICANT CHANGE UP (ref 3.5–5.3)
PROT SERPL-MCNC: 6.2 G/DL — SIGNIFICANT CHANGE UP (ref 6–8.3)
RBC # BLD: 4.39 M/UL — SIGNIFICANT CHANGE UP (ref 3.8–5.2)
RBC # FLD: 12.7 % — SIGNIFICANT CHANGE UP (ref 10.3–14.5)
SODIUM SERPL-SCNC: 138 MMOL/L — SIGNIFICANT CHANGE UP (ref 135–145)
WBC # BLD: 10.63 K/UL — HIGH (ref 3.8–10.5)
WBC # FLD AUTO: 10.63 K/UL — HIGH (ref 3.8–10.5)

## 2021-03-20 PROCEDURE — 99223 1ST HOSP IP/OBS HIGH 75: CPT | Mod: GC,AI

## 2021-03-20 PROCEDURE — 99223 1ST HOSP IP/OBS HIGH 75: CPT

## 2021-03-20 RX ADMIN — INSULIN GLARGINE 18 UNIT(S): 100 INJECTION, SOLUTION SUBCUTANEOUS at 07:56

## 2021-03-20 RX ADMIN — Medication 7 UNIT(S): at 08:01

## 2021-03-20 RX ADMIN — Medication 90 MILLIGRAM(S): at 05:28

## 2021-03-20 RX ADMIN — ATORVASTATIN CALCIUM 80 MILLIGRAM(S): 80 TABLET, FILM COATED ORAL at 21:14

## 2021-03-20 RX ADMIN — HEPARIN SODIUM 5000 UNIT(S): 5000 INJECTION INTRAVENOUS; SUBCUTANEOUS at 17:43

## 2021-03-20 RX ADMIN — PANTOPRAZOLE SODIUM 40 MILLIGRAM(S): 20 TABLET, DELAYED RELEASE ORAL at 05:31

## 2021-03-20 RX ADMIN — Medication 1: at 21:15

## 2021-03-20 RX ADMIN — HEPARIN SODIUM 5000 UNIT(S): 5000 INJECTION INTRAVENOUS; SUBCUTANEOUS at 05:28

## 2021-03-20 RX ADMIN — Medication 1: at 08:01

## 2021-03-20 RX ADMIN — Medication 7 UNIT(S): at 11:56

## 2021-03-20 RX ADMIN — Medication 1: at 11:56

## 2021-03-20 RX ADMIN — Medication 81 MILLIGRAM(S): at 13:00

## 2021-03-20 RX ADMIN — Medication 3 MILLIGRAM(S): at 21:14

## 2021-03-20 RX ADMIN — Medication 48 MILLIGRAM(S): at 13:01

## 2021-03-20 NOTE — DIETITIAN INITIAL EVALUATION ADULT. - OTHER INFO
69 y/o woman with pmhx of CAD s/p stent 6 years ago, Dm2, ?CKD, HTN, found to have R internal capsule, R corona radiata. Now with left hemiparesis, dysphasia.  Pt maintained on mechanical soft, minced and moist (pending swallow eval today). She reports good appetite, no meals documented yet. UBW 110lbs. Pt denies any appetite changes, but feels like her clothes fit looser. No skin breakdown or edema. No GI distress, last BM 3/18. Explained current diet consistency + educated on consistent carbohydrate. Pt was previously only told that she was "borderline diabetic" by her PCP about 5 months ago. Pt was able to teach back on glycemic/A1c goals. Reviewed portion control, balanced meals, avoidance of concentrated sugar, no added salt. Handouts provided on MyPlate, carbohydrate serving recommendations. Menu provided and ordering procedures reviewed. Encouraged pt to follow up with [facility] outpatient s/p d/c.

## 2021-03-20 NOTE — CONSULT NOTE ADULT - ASSESSMENT
69 yo woman with history of CAD s/p stent 6 years ago, DM II, CKD, HTN admitted to [facility] after acute CVA    #Impaired gait, mobility, ADL  #CVA  - Continue comprehensive rehab program  - Fall precautions  - continue ASA and statin  - Bowel regimen as per primary team  - Pain meds as per primary team     #DM II - uncontrolled  - A1C - 13.9 (3/15)  - Continue with lantus, pre-meal and ISS  - Holding home oral diabetic medications     #CKD IV  - possible due to untreated diabetes and hypertension  - Renal US (3/15) Right Kidney - 10.8 cm. No renal mass, hydronephrosis or calculi. Left kidney: 8.6 cm. No hydronephrosis or calculi. Upper pole angiomyolipoma measures 8 mm.  - Avoid nephrotoxic agents  - Renally dose meds  - monitor BMP  - Consider nephrology consult    #Transaminitis   - trend LFTs    #CAD s/p stent 8 yrs ago  #History HTN  - Continue ASA, statin, fenofibrate  - Continue Nifedipine  - Continue to monitor BP    #DVT ppx - heparin

## 2021-03-20 NOTE — DIETITIAN INITIAL EVALUATION ADULT. - ORAL INTAKE PTA/DIET HISTORY
Pt reports regular diet PTA with generally good appetite. She cooks all meals at home and does not use added salt. She was not restricting concentrated sugar or carbohydrates, using regular sugar. Eats 2 meals/day, snacks on fruits. Does not eat pork or turkey. No SMBG.

## 2021-03-20 NOTE — DIETITIAN INITIAL EVALUATION ADULT. - PERSON TAUGHT/METHOD
Consistent carbohydrate, DASH/TLC/verbal instruction/written material/patient instructed/teach back - (Patient repeats in own words)

## 2021-03-20 NOTE — CONSULT NOTE ADULT - SUBJECTIVE AND OBJECTIVE BOX
Patient is a 70y old  Female who presents with a chief complaint of 01.1 Left Body Involvement (Right Brain) (20 Mar 2021 08:36)      HPI:  69 y/o woman with pmhx of CAD s/p stent 6 years ago, Dm2, ?CKD, HTN who presents to the ER with 1-2 days of generalized weakness.  Reportedly per her son she had fallen and was slightly confused but with no LOC or head strike. She was found to have slurred speech at some point as well. She denies any headache or vision changes. Last normal per son around 10 PM friday. in the ER pt was given asa, ceftriaxone and 2L NS.   CT head revealed acute cva involving R internal capsule, R corona radiata.     Medically stable for transfer to [facility] on 3/19/2021. Patient seen and examined at bedside, no complaints.  (19 Mar 2021 17:06)      TODAY:  Patient seen and examined in NAD  No overnight events  Feels weak and unsure about therapy. Gave encourage words. And stressed that it would take time    PAST MEDICAL & SURGICAL HISTORY:  Hypertension    DM (diabetes mellitus)    CAD (coronary artery disease)    s/p Cardiac stent 8 yrs ago      FAMILY HISTORY:  Denies HTN, DM II, Thyroid Disease, or Cancer      SOCIAL HISTORY:  Substance Use (street drugs): (  ) never used  Tobacco Usage:  (   ) never smoked   Alcohol Usage: denies    ALLERGIES  No Known Allergies    Intolerances -NONE        REVIEW OF SYSTEMS:  CONSTITUTIONAL: Weakness; No fever, weight loss, or fatigue  EYES: No eye pain, visual disturbances, or discharge  RESPIRATORY: No cough, wheezing, chills or hemoptysis; No shortness of breath  CARDIOVASCULAR: No chest pain, palpitations, dizziness, or leg swelling  GASTROINTESTINAL: No abdominal or epigastric pain. No nausea, vomiting, or hematemesis; No diarrhea or constipation. No melena or hematochezia.  GENITOURINARY: No dysuria, frequency, hematuria, or incontinence  NEUROLOGICAL: No headaches, memory loss, loss of strength, numbness, or tremors  SKIN: No itching, burning, rashes, or lesions   MUSCULOSKELETAL: No joint pain or swelling; No muscle, back, or extremity pain  PSYCHIATRIC: No depression, anxiety, mood swings, or difficulty sleeping    ALL OTHER SYSTEMS REVIEWED AND ARE NEGATIVE      T(C): 36.6 (21 @ 09:28), Max: 37.1 (21 @ 19:00)  HR: 96 (21 @ 09:28) (92 - 100)  BP: 123/70 (21 @ 09:28) (118/91 - 167/71)  RR: 16 (21 @ 09:28) (14 - 16)  SpO2: 98% (21 @ 09:28) (96% - 98%)  Wt(kg): --Vital Signs Last 24 Hrs  T(C): 36.6 (20 Mar 2021 09:28), Max: 37.1 (19 Mar 2021 19:00)  T(F): 97.9 (20 Mar 2021 09:28), Max: 98.7 (19 Mar 2021 19:00)  HR: 96 (20 Mar 2021 09:28) (92 - 100)  BP: 123/70 (20 Mar 2021 09:28) (118/91 - 167/71)  BP(mean): --  RR: 16 (20 Mar 2021 09:28) (14 - 16)  SpO2: 98% (20 Mar 2021 09:28) (96% - 98%)    PHYSICAL EXAM:  GENERAL: NAD  HENT:  Atraumatic, Normocephalic; No tonsillar erythema, exudates, ulcers, or enlargement; Moist mucous membranes  EYES: EOMI, PERRLA, conjunctiva and sclera clear; no lid-lag  NECK: Supple, No JVD, Normal thyroid  CHEST/LUNG: Clear to percussion bilaterally; No rales, rhonchi, wheezing, or rubs; normal respiratory effort, no intercostal retractions  HEART: Regular rate and rhythm; No murmurs, rubs, or gallops  ABDOMEN: Soft, Nontender, Nondistended; Bowel sounds present; No HSM  EXTREMITIES:  2+ Peripheral Pulses, No clubbing, cyanosis, or peripheral edema  LYMPH: No lymphadenopathy noted  SKIN: No rashes or lesions; normal temperature and turgor   PSYCH: Appropriate affect; Alert & Oriented x 3    LABS:                        12.1   10.63 )-----------( 387      ( 20 Mar 2021 06:00 )             37.2     03-20    138  |  104  |  41<H>  ----------------------------<  136<H>  4.4   |  26  |  2.13<H>    Ca    8.2<L>      20 Mar 2021 06:00    TPro  6.2  /  Alb  2.1<L>  /  TBili  0.2  /  DBili  x   /  AST  71<H>  /  ALT  79<H>  /  AlkPhos  68  03-20      Urinalysis Basic - ( 19 Mar 2021 04:04 )    Color: Light Yellow / Appearance: Clear / S.012 / pH: x  Gluc: x / Ketone: Negative  / Bili: Negative / Urobili: <2 mg/dL   Blood: x / Protein: 300 mg/dL / Nitrite: Negative   Leuk Esterase: Negative / RBC: 1 /HPF / WBC 1 /HPF   Sq Epi: x / Non Sq Epi: 1 /HPF / Bacteria: Negative      CAPILLARY BLOOD GLUCOSE  POCT Blood Glucose.: 186 mg/dL (20 Mar 2021 07:54)  POCT Blood Glucose.: 195 mg/dL (19 Mar 2021 22:02)  POCT Blood Glucose.: 194 mg/dL (19 Mar 2021 12:28)      Urinalysis Basic - ( 19 Mar 2021 04:04 )    Color: Light Yellow / Appearance: Clear / S.012 / pH: x  Gluc: x / Ketone: Negative  / Bili: Negative / Urobili: <2 mg/dL   Blood: x / Protein: 300 mg/dL / Nitrite: Negative   Leuk Esterase: Negative / RBC: 1 /HPF / WBC 1 /HPF   Sq Epi: x / Non Sq Epi: 1 /HPF / Bacteria: Negative    Previous Consultant(s) Notes Reviewed:  [x ] YES  [ ] NO  Care Discussed with Consultants/Other Providers [ x] YES  [ ] NO  Previous Imaging Personally Reviewed:  [X ] YES  [ ] NO Patient is a 70y old  Female who presents with a chief complaint of 01.1 Left Body Involvement (Right Brain) (20 Mar 2021 08:36)      HPI:  71 y/o woman with pmhx of CAD s/p stent 6 years ago, Dm2, ?CKD, HTN who presents to the ER with 1-2 days of generalized weakness.  Reportedly per her son she had fallen and was slightly confused but with no LOC or head strike. She was found to have slurred speech at some point as well. She denies any headache or vision changes. Last normal per son around 10 PM friday. in the ER pt was given asa, ceftriaxone and 2L NS.   CT head revealed acute cva involving R internal capsule, R corona radiata.     Medically stable for transfer to [facility] on 3/19/2021. Patient seen and examined at bedside, no complaints.  (19 Mar 2021 17:06)      TODAY:  Patient seen and examined in NAD  No overnight events  Feels weak and unsure about therapy. Gave encourage words. And stressed that it would take time    PAST MEDICAL & SURGICAL HISTORY:  Hypertension    DM (diabetes mellitus)    CAD (coronary artery disease)    s/p Cardiac stent 8 yrs ago      FAMILY HISTORY:  Denies HTN, DM II, Thyroid Disease, or Cancer      SOCIAL HISTORY:  Substance Use (street drugs): (  ) never used  Tobacco Usage:  (   ) never smoked   Alcohol Usage: denies    ALLERGIES  No Known Allergies    Intolerances -NONE        REVIEW OF SYSTEMS:  CONSTITUTIONAL: Weakness; No fever, weight loss, or fatigue  EYES: No eye pain, visual disturbances, or discharge  RESPIRATORY: No cough, wheezing, chills or hemoptysis; No shortness of breath  CARDIOVASCULAR: No chest pain, palpitations, dizziness, or leg swelling  GASTROINTESTINAL: No abdominal or epigastric pain. No nausea, vomiting, or hematemesis; No diarrhea or constipation. No melena or hematochezia.  GENITOURINARY: No dysuria, frequency, hematuria, or incontinence  NEUROLOGICAL: No headaches, memory loss, loss of strength, numbness, or tremors  SKIN: No itching, burning, rashes, or lesions   MUSCULOSKELETAL: No joint pain or swelling; No muscle, back, or extremity pain  PSYCHIATRIC: No depression, anxiety, mood swings, or difficulty sleeping    ALL OTHER SYSTEMS REVIEWED AND ARE NEGATIVE      T(C): 36.6 (21 @ 09:28), Max: 37.1 (21 @ 19:00)  HR: 96 (21 @ 09:28) (92 - 100)  BP: 123/70 (21 @ 09:28) (118/91 - 167/71)  RR: 16 (21 @ 09:28) (14 - 16)  SpO2: 98% (21 @ 09:28) (96% - 98%)  Wt(kg): --Vital Signs Last 24 Hrs  T(C): 36.6 (20 Mar 2021 09:28), Max: 37.1 (19 Mar 2021 19:00)  T(F): 97.9 (20 Mar 2021 09:28), Max: 98.7 (19 Mar 2021 19:00)  HR: 96 (20 Mar 2021 09:28) (92 - 100)  BP: 123/70 (20 Mar 2021 09:28) (118/91 - 167/71)  BP(mean): --  RR: 16 (20 Mar 2021 09:28) (14 - 16)  SpO2: 98% (20 Mar 2021 09:28) (96% - 98%)    PHYSICAL EXAM:  GENERAL: NAD  HENT:  Atraumatic, Normocephalic; No tonsillar erythema, exudates, ulcers, or enlargement; Moist mucous membranes  EYES: EOMI, PERRLA, conjunctiva and sclera clear; no lid-lag  NECK: Supple, No JVD, Normal thyroid  CHEST/LUNG: Clear to percussion bilaterally; No rales, rhonchi, wheezing, or rubs; normal respiratory effort, no intercostal retractions  HEART: Regular rate and rhythm; No murmurs, rubs, or gallops  ABDOMEN: Soft, Nontender, Nondistended; Bowel sounds present; No HSM  EXTREMITIES:  2+ Peripheral Pulses, No clubbing, cyanosis, or peripheral edema  LYMPH: No lymphadenopathy noted  SKIN: No rashes or lesions; normal temperature and turgor   PSYCH: Appropriate affect; Alert & Oriented x 3    LABS:                        12.1   10.63 )-----------( 387      ( 20 Mar 2021 06:00 )             37.2     03-20    138  |  104  |  41<H>  ----------------------------<  136<H>  4.4   |  26  |  2.13<H>    Ca    8.2<L>      20 Mar 2021 06:00    TPro  6.2  /  Alb  2.1<L>  /  TBili  0.2  /  DBili  x   /  AST  71<H>  /  ALT  79<H>  /  AlkPhos  68  03-20      Urinalysis Basic - ( 19 Mar 2021 04:04 )    Color: Light Yellow / Appearance: Clear / S.012 / pH: x  Gluc: x / Ketone: Negative  / Bili: Negative / Urobili: <2 mg/dL   Blood: x / Protein: 300 mg/dL / Nitrite: Negative   Leuk Esterase: Negative / RBC: 1 /HPF / WBC 1 /HPF   Sq Epi: x / Non Sq Epi: 1 /HPF / Bacteria: Negative      CAPILLARY BLOOD GLUCOSE  POCT Blood Glucose.: 186 mg/dL (20 Mar 2021 07:54)  POCT Blood Glucose.: 195 mg/dL (19 Mar 2021 22:02)  POCT Blood Glucose.: 194 mg/dL (19 Mar 2021 12:28)      Urinalysis Basic - ( 19 Mar 2021 04:04 )    Color: Light Yellow / Appearance: Clear / S.012 / pH: x  Gluc: x / Ketone: Negative  / Bili: Negative / Urobili: <2 mg/dL   Blood: x / Protein: 300 mg/dL / Nitrite: Negative   Leuk Esterase: Negative / RBC: 1 /HPF / WBC 1 /HPF   Sq Epi: x / Non Sq Epi: 1 /HPF / Bacteria: Negative    Previous Consultant(s) Notes Reviewed:  [x ] YES  [ ] NO  Care Discussed with Consultants/Other Providers [ x] YES  [ ] NO  Previous Imaging Personally Reviewed:  [X ] YES  [ ] NO

## 2021-03-20 NOTE — DIETITIAN INITIAL EVALUATION ADULT. - COLLABORATION WITH OTHER PROVIDERS
Diet consistency per speech therapy (pending eval today). Recommend consult with CDE for A1c 13.9% (minimal prior management)

## 2021-03-21 LAB
GLUCOSE BLDC GLUCOMTR-MCNC: 115 MG/DL — HIGH (ref 70–99)
GLUCOSE BLDC GLUCOMTR-MCNC: 121 MG/DL — HIGH (ref 70–99)
GLUCOSE BLDC GLUCOMTR-MCNC: 139 MG/DL — HIGH (ref 70–99)
GLUCOSE BLDC GLUCOMTR-MCNC: 227 MG/DL — HIGH (ref 70–99)

## 2021-03-21 PROCEDURE — 99233 SBSQ HOSP IP/OBS HIGH 50: CPT

## 2021-03-21 PROCEDURE — 99232 SBSQ HOSP IP/OBS MODERATE 35: CPT

## 2021-03-21 RX ADMIN — HEPARIN SODIUM 5000 UNIT(S): 5000 INJECTION INTRAVENOUS; SUBCUTANEOUS at 17:05

## 2021-03-21 RX ADMIN — Medication 81 MILLIGRAM(S): at 11:21

## 2021-03-21 RX ADMIN — Medication 48 MILLIGRAM(S): at 11:21

## 2021-03-21 RX ADMIN — Medication 3 MILLIGRAM(S): at 21:20

## 2021-03-21 RX ADMIN — PANTOPRAZOLE SODIUM 40 MILLIGRAM(S): 20 TABLET, DELAYED RELEASE ORAL at 05:50

## 2021-03-21 RX ADMIN — ATORVASTATIN CALCIUM 80 MILLIGRAM(S): 80 TABLET, FILM COATED ORAL at 21:20

## 2021-03-21 RX ADMIN — Medication 7 UNIT(S): at 11:52

## 2021-03-21 RX ADMIN — HEPARIN SODIUM 5000 UNIT(S): 5000 INJECTION INTRAVENOUS; SUBCUTANEOUS at 05:50

## 2021-03-21 RX ADMIN — Medication 90 MILLIGRAM(S): at 05:50

## 2021-03-21 RX ADMIN — Medication 2: at 07:35

## 2021-03-21 RX ADMIN — Medication 7 UNIT(S): at 07:35

## 2021-03-21 RX ADMIN — INSULIN GLARGINE 18 UNIT(S): 100 INJECTION, SOLUTION SUBCUTANEOUS at 07:34

## 2021-03-21 RX ADMIN — Medication 7 UNIT(S): at 17:05

## 2021-03-21 NOTE — PROGRESS NOTE ADULT - ASSESSMENT
71 yo woman with history of CAD s/p stent 6 years ago, DM II, CKD, HTN admitted to [facility] after acute CVA    #Impaired gait, mobility, ADL  #CVA  - Continue comprehensive rehab program  - Fall precautions  - continue ASA and statin  - Bowel regimen as per primary team  - Pain meds as per primary team     #DM II - uncontrolled  - A1C - 13.9 (3/15)  - Continue with lantus, pre-meal and ISS  - Holding home oral diabetic medications   - Received 5 units in past 24 hrs  - Changed diet to diabetic and renal. Will continue to monitor    #CKD IV  - possible due to untreated diabetes and hypertension  - Renal US (3/15) Right Kidney - 10.8 cm. No renal mass, hydronephrosis or calculi. Left kidney: 8.6 cm. No hydronephrosis or calculi. Upper pole angiomyolipoma measures 8 mm.  - Avoid nephrotoxic agents  - Renally dose meds  - monitor BMP  - Consider nephrology consult    #Transaminitis   - trend LFTs    #CAD s/p stent 8 yrs ago  #History HTN  - Continue ASA, statin, fenofibrate  - Continue Nifedipine  - Continue to monitor BP    #DVT ppx - heparin

## 2021-03-22 LAB
ALBUMIN SERPL ELPH-MCNC: 2.1 G/DL — LOW (ref 3.3–5)
ALP SERPL-CCNC: 58 U/L — SIGNIFICANT CHANGE UP (ref 40–120)
ALT FLD-CCNC: 44 U/L — SIGNIFICANT CHANGE UP (ref 10–45)
ANION GAP SERPL CALC-SCNC: 9 MMOL/L — SIGNIFICANT CHANGE UP (ref 5–17)
AST SERPL-CCNC: 24 U/L — SIGNIFICANT CHANGE UP (ref 10–40)
BASOPHILS # BLD AUTO: 0.03 K/UL — SIGNIFICANT CHANGE UP (ref 0–0.2)
BASOPHILS NFR BLD AUTO: 0.3 % — SIGNIFICANT CHANGE UP (ref 0–2)
BILIRUB SERPL-MCNC: 0.2 MG/DL — SIGNIFICANT CHANGE UP (ref 0.2–1.2)
BUN SERPL-MCNC: 52 MG/DL — HIGH (ref 7–23)
CALCIUM SERPL-MCNC: 8.2 MG/DL — LOW (ref 8.4–10.5)
CHLORIDE SERPL-SCNC: 103 MMOL/L — SIGNIFICANT CHANGE UP (ref 96–108)
CO2 SERPL-SCNC: 26 MMOL/L — SIGNIFICANT CHANGE UP (ref 22–31)
CREAT SERPL-MCNC: 2.8 MG/DL — HIGH (ref 0.5–1.3)
EOSINOPHIL # BLD AUTO: 0.6 K/UL — HIGH (ref 0–0.5)
EOSINOPHIL NFR BLD AUTO: 5.2 % — SIGNIFICANT CHANGE UP (ref 0–6)
GLUCOSE BLDC GLUCOMTR-MCNC: 120 MG/DL — HIGH (ref 70–99)
GLUCOSE BLDC GLUCOMTR-MCNC: 169 MG/DL — HIGH (ref 70–99)
GLUCOSE BLDC GLUCOMTR-MCNC: 217 MG/DL — HIGH (ref 70–99)
GLUCOSE BLDC GLUCOMTR-MCNC: 91 MG/DL — SIGNIFICANT CHANGE UP (ref 70–99)
GLUCOSE SERPL-MCNC: 189 MG/DL — HIGH (ref 70–99)
HCT VFR BLD CALC: 31.5 % — LOW (ref 34.5–45)
HGB BLD-MCNC: 10.3 G/DL — LOW (ref 11.5–15.5)
IMM GRANULOCYTES NFR BLD AUTO: 0.6 % — SIGNIFICANT CHANGE UP (ref 0–1.5)
LYMPHOCYTES # BLD AUTO: 2.7 K/UL — SIGNIFICANT CHANGE UP (ref 1–3.3)
LYMPHOCYTES # BLD AUTO: 23.3 % — SIGNIFICANT CHANGE UP (ref 13–44)
MCHC RBC-ENTMCNC: 27.3 PG — SIGNIFICANT CHANGE UP (ref 27–34)
MCHC RBC-ENTMCNC: 32.7 GM/DL — SIGNIFICANT CHANGE UP (ref 32–36)
MCV RBC AUTO: 83.6 FL — SIGNIFICANT CHANGE UP (ref 80–100)
MONOCYTES # BLD AUTO: 0.93 K/UL — HIGH (ref 0–0.9)
MONOCYTES NFR BLD AUTO: 8 % — SIGNIFICANT CHANGE UP (ref 2–14)
NEUTROPHILS # BLD AUTO: 7.28 K/UL — SIGNIFICANT CHANGE UP (ref 1.8–7.4)
NEUTROPHILS NFR BLD AUTO: 62.6 % — SIGNIFICANT CHANGE UP (ref 43–77)
NRBC # BLD: 0 /100 WBCS — SIGNIFICANT CHANGE UP (ref 0–0)
PLATELET # BLD AUTO: 350 K/UL — SIGNIFICANT CHANGE UP (ref 150–400)
POTASSIUM SERPL-MCNC: 4.3 MMOL/L — SIGNIFICANT CHANGE UP (ref 3.5–5.3)
POTASSIUM SERPL-SCNC: 4.3 MMOL/L — SIGNIFICANT CHANGE UP (ref 3.5–5.3)
PROT SERPL-MCNC: 5.9 G/DL — LOW (ref 6–8.3)
RBC # BLD: 3.77 M/UL — LOW (ref 3.8–5.2)
RBC # FLD: 12.7 % — SIGNIFICANT CHANGE UP (ref 10.3–14.5)
SODIUM SERPL-SCNC: 138 MMOL/L — SIGNIFICANT CHANGE UP (ref 135–145)
WBC # BLD: 11.61 K/UL — HIGH (ref 3.8–10.5)
WBC # FLD AUTO: 11.61 K/UL — HIGH (ref 3.8–10.5)

## 2021-03-22 PROCEDURE — 99232 SBSQ HOSP IP/OBS MODERATE 35: CPT | Mod: GC

## 2021-03-22 PROCEDURE — 96116 NUBHVL XM PHYS/QHP 1ST HR: CPT

## 2021-03-22 PROCEDURE — 99233 SBSQ HOSP IP/OBS HIGH 50: CPT

## 2021-03-22 RX ORDER — CARVEDILOL PHOSPHATE 80 MG/1
6.25 CAPSULE, EXTENDED RELEASE ORAL EVERY 12 HOURS
Refills: 0 | Status: ACTIVE | OUTPATIENT
Start: 2021-03-22 | End: 2022-02-18

## 2021-03-22 RX ADMIN — Medication 3 MILLIGRAM(S): at 21:14

## 2021-03-22 RX ADMIN — Medication 1: at 08:00

## 2021-03-22 RX ADMIN — Medication 7 UNIT(S): at 12:15

## 2021-03-22 RX ADMIN — Medication 7 UNIT(S): at 08:00

## 2021-03-22 RX ADMIN — Medication 48 MILLIGRAM(S): at 11:15

## 2021-03-22 RX ADMIN — CARVEDILOL PHOSPHATE 6.25 MILLIGRAM(S): 80 CAPSULE, EXTENDED RELEASE ORAL at 17:38

## 2021-03-22 RX ADMIN — HEPARIN SODIUM 5000 UNIT(S): 5000 INJECTION INTRAVENOUS; SUBCUTANEOUS at 06:15

## 2021-03-22 RX ADMIN — Medication 90 MILLIGRAM(S): at 06:15

## 2021-03-22 RX ADMIN — Medication 81 MILLIGRAM(S): at 11:15

## 2021-03-22 RX ADMIN — HEPARIN SODIUM 5000 UNIT(S): 5000 INJECTION INTRAVENOUS; SUBCUTANEOUS at 17:38

## 2021-03-22 RX ADMIN — ATORVASTATIN CALCIUM 80 MILLIGRAM(S): 80 TABLET, FILM COATED ORAL at 21:14

## 2021-03-22 RX ADMIN — PANTOPRAZOLE SODIUM 40 MILLIGRAM(S): 20 TABLET, DELAYED RELEASE ORAL at 06:15

## 2021-03-22 RX ADMIN — INSULIN GLARGINE 18 UNIT(S): 100 INJECTION, SOLUTION SUBCUTANEOUS at 07:59

## 2021-03-22 NOTE — PROGRESS NOTE ADULT - ASSESSMENT
71 yo woman with history of CAD s/p stent 6 years ago, DM II, CKD, HTN admitted to [facility] after acute CVA    #Impaired gait, mobility, ADL  #CVA  -Continue comprehensive rehab program  -Fall precautions  -continue ASA and statin    #DM II  -HA1C 13.9 (3/15)  -Continue with current regimen of lantus, pre-meal and ISS as noted to have episodes of low/normal fingersticks  -Due to poor renal function, pt should not be on Oral diabetic medications     #BROCK on CKD IV  -Fluctuating Cr w/ baseline Cr of 2-2.1  -Renal US (3/15) Right Kidney - 10.8 cm. No renal mass, hydronephrosis or calculi. Left kidney: 8.6 cm. No hydronephrosis or calculi. Upper pole angiomyolipoma measures 8 mm.  -Avoid nephrotoxic agents  -Renally dose meds  -Follow up with urine lytes  -monitor BMP  -Nephrology consult if continues to worsen    #Transaminitis   - trend LFTs    #CAD s/p stent 8 yrs ago  #HTN  -Continue ASA, statin, fenofibrate  -Continue Nifedipine  -Start Carvedilol 6.25mg BID   -Continue to monitor BP    #DVT ppx - heparin

## 2021-03-22 NOTE — CONSULT NOTE ADULT - ASSESSMENT
Assessment:    FIM scores: Social Interaction ; Problem Solving ; Memory .  Plan: Individual supportive psychotherapy to monitor cognition, affect/mood, and behavior. Cognitive remediation during speech therapy sessions. Participation in recreation/art therapy in order to have pleasure and mastery experiences and regain/reestablish a sense of routine.     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 She exhibits difficulties in attention/concentration, working memory, short-term memory for verbal information with mild loss of information over time, visuospatial skills (poor visuomotor integration, left visual inattention), as well as in aspects of executive functions (i.e., organizational skills, abstract reasoning, and problem solving). Her affect is constricted and she reports a few adjustment symptoms including helplessness/hopelessness, low self-esteem, and poor sleep; the latter she reports as her most bothersome symptom at this time. FIM scores: Social Interaction 5; Problem Solving 4; Memory 5.  Plan: Individual supportive psychotherapy to monitor cognition, affect/mood, and behavior. Pt will benefit from improved sleep, please consider increase of melatonin dose.  Cognitive remediation during speech therapy sessions is recommended. Participation in recreation/art therapy in order to have pleasure and mastery experiences and regain/reestablish a sense of routine.

## 2021-03-22 NOTE — CONSULT NOTE ADULT - SUBJECTIVE AND OBJECTIVE BOX
Pt is 71 y/o right-handed woman with PMHx of CAD s/p stent 6 years ago, Dm2, ?CKD, HTN who presents to the ER with 1-2 days of generalized weakness.  Reportedly per her son she had fallen and was slightly confused but with no LOC or head strike. She was found to have slurred speech at some point as well. She denies any headache or vision changes. Last normal per son around 10 PM friday. in the ER pt was given asa, ceftriaxone and 2L NS.   CT head revealed acute cva involving R internal capsule, R corona radiata. Medically stable for transfer to [facility] on 3/19/2021. PMHx: As noted above. Current meds: Please see list in Pt’s chart. Social Hx: Pt is  and has two adult children. She graduated from high school in [place] and is a retired . She lacks hx of mental illness and substance abuse. Pt is [employer]. She enjoys walking and going to the park.   Findings: Pt was seen for an initial assessment of her cognitive and emotional functioning. MMSE was administered; Pt’s results (24/30) were in the Mild Impairment range. Her scores in a geriatric mood measure suggested mild levels of depression (GDS = 3/15). Pt was alert, closely Ox3 (disoriented to the season and hospital floor), and cooperative. Attn/Conc: Simple auditory attention - impaired.  Concentration - impaired. Working memory for calculations – moderately impaired (1/5 serial 7s); reversed spelling - moderately impaired (2/5).	 Language: Pt’s speech was of normal volume, pitch and pace. Naming - intact. Sentence repetition - intact. Auditory Comprehension - intact. Reading - intact. Writing - intact. Memory: Encoding of 3 words was intact (3/3); short-delayed verbal recall – intact (3/3); long-delayed verbal recall – mildly impaired (2/3, improving to 3/3 with cueing). LTM was mildly impaired for US presidents (3/4, no improvement with cueing noted). Visual memory – impaired. Visuospatial: Visuomotor integration – impaired for copy of a 2D figure, left visual inattention and distortion were noted. Executive Functions: Motor Planning - intact. Organizational skills - moderately impaired. Abstract reasoning - moderately impaired. Verbal problem solving – mildly impaired. Emotional functioning: Affect - constricted. Mood - euthymic ("better"); Pt reported experiencing helplessness, hopelessness and decreased appetite. On mood measures she additionally reported helplessness, hopelessness and low self-esteem.  Thought processes were goal-directed.  No abnormal thought contents were observed.  Pt denied any AH/VH. Pt also denied SI/HI/I/P. Insight - fair. Judgment - fair.

## 2021-03-23 LAB
CREAT ?TM UR-MCNC: 66 MG/DL — SIGNIFICANT CHANGE UP
GLUCOSE BLDC GLUCOMTR-MCNC: 140 MG/DL — HIGH (ref 70–99)
GLUCOSE BLDC GLUCOMTR-MCNC: 159 MG/DL — HIGH (ref 70–99)
GLUCOSE BLDC GLUCOMTR-MCNC: 192 MG/DL — HIGH (ref 70–99)
GLUCOSE BLDC GLUCOMTR-MCNC: 82 MG/DL — SIGNIFICANT CHANGE UP (ref 70–99)
SODIUM UR-SCNC: 26 MMOL/L — SIGNIFICANT CHANGE UP

## 2021-03-23 PROCEDURE — 99232 SBSQ HOSP IP/OBS MODERATE 35: CPT | Mod: GC

## 2021-03-23 PROCEDURE — 99232 SBSQ HOSP IP/OBS MODERATE 35: CPT

## 2021-03-23 RX ORDER — LANOLIN ALCOHOL/MO/W.PET/CERES
9 CREAM (GRAM) TOPICAL AT BEDTIME
Refills: 0 | Status: DISCONTINUED | OUTPATIENT
Start: 2021-03-23 | End: 2021-03-26

## 2021-03-23 RX ORDER — LANOLIN ALCOHOL/MO/W.PET/CERES
6 CREAM (GRAM) TOPICAL AT BEDTIME
Refills: 0 | Status: DISCONTINUED | OUTPATIENT
Start: 2021-03-23 | End: 2021-03-23

## 2021-03-23 RX ADMIN — Medication 7 UNIT(S): at 11:37

## 2021-03-23 RX ADMIN — Medication 90 MILLIGRAM(S): at 06:00

## 2021-03-23 RX ADMIN — PANTOPRAZOLE SODIUM 40 MILLIGRAM(S): 20 TABLET, DELAYED RELEASE ORAL at 06:00

## 2021-03-23 RX ADMIN — CARVEDILOL PHOSPHATE 6.25 MILLIGRAM(S): 80 CAPSULE, EXTENDED RELEASE ORAL at 06:00

## 2021-03-23 RX ADMIN — Medication 48 MILLIGRAM(S): at 11:12

## 2021-03-23 RX ADMIN — HEPARIN SODIUM 5000 UNIT(S): 5000 INJECTION INTRAVENOUS; SUBCUTANEOUS at 17:08

## 2021-03-23 RX ADMIN — Medication 7 UNIT(S): at 08:07

## 2021-03-23 RX ADMIN — Medication 81 MILLIGRAM(S): at 11:12

## 2021-03-23 RX ADMIN — Medication 9 MILLIGRAM(S): at 22:15

## 2021-03-23 RX ADMIN — CARVEDILOL PHOSPHATE 6.25 MILLIGRAM(S): 80 CAPSULE, EXTENDED RELEASE ORAL at 17:08

## 2021-03-23 RX ADMIN — ATORVASTATIN CALCIUM 80 MILLIGRAM(S): 80 TABLET, FILM COATED ORAL at 22:15

## 2021-03-23 RX ADMIN — Medication 1: at 11:38

## 2021-03-23 RX ADMIN — INSULIN GLARGINE 18 UNIT(S): 100 INJECTION, SOLUTION SUBCUTANEOUS at 08:07

## 2021-03-23 RX ADMIN — HEPARIN SODIUM 5000 UNIT(S): 5000 INJECTION INTRAVENOUS; SUBCUTANEOUS at 06:00

## 2021-03-23 NOTE — PROGRESS NOTE ADULT - ASSESSMENT
71 yo woman with history of CAD s/p stent 6 years ago, DM II, CKD, HTN admitted to [facility] after acute CVA    #Impaired gait, mobility, ADL  #CVA  -Continue comprehensive rehab program  -Fall precautions  -continue ASA and statin    #DM II  -HA1C 13.9 (3/15)  -Continue with current regimen of lantus, pre-meal and ISS as noted to have episodes of low/normal fingersticks  -Due to poor renal function, pt should not be on Oral diabetic medications     #BROCK on CKD IV  -Fluctuating Cr w/ baseline Cr of 2-2.1  -Renal US (3/15) Right Kidney - 10.8 cm. No renal mass, hydronephrosis or calculi. Left kidney: 8.6 cm. No hydronephrosis or calculi. Upper pole angiomyolipoma measures 8 mm.  -Avoid nephrotoxic agents  -Renally dose meds  -Follow up with urine lytes  -monitor BMP  -Nephrology consult if continues to worsen    #Transaminitis   - trend LFTs    #CAD s/p stent 8 yrs ago  #HTN  -Continue ASA, statin, fenofibrate  -Continue Nifedipine  -Continue Carvedilol 6.25mg BID   -Continue to monitor BP    #DVT ppx - heparin

## 2021-03-23 NOTE — ADVANCED PRACTICE NURSE CONSULT - RECOMMEDATIONS
Primary RN to educate pt/family on:  1.  Use of glucometer (pt has been give Contour Next EZ meter) and FS testing,  2. Insulin action, storage, SE, administration via insulin pen.  3. S/S and treatment of hypoglycemia   No metformin due to present GFR  Continue current insulin regime.  If BG readings remain in current range- Discharge on Lantus or equivalent substitute  18 units every morning, Admelog or equivalent substitue 7 units before each meal.  FU with Endocrinology upon discharge Primary RN to educate pt/family on:  1.  Use of glucometer (pt has been give Contour Next EZ meter) and FS testing,  2. Insulin action, storage, SE, administration via insulin pen.  3. S/S and treatment of hypoglycemia    Upon discharge pt will need the following supplies order form the diabetes favorite list:   No metformin due to present GFR  Continue current insulin regime.  If BG readings remain in current range- Discharge on Lantus or equivalent substitute  18 units every morning, Admelog or equivalent substitue 7 units before each meal.  FU with Endocrinology upon discharge Primary RN to educate pt/family on:  1.  Use of glucometer (pt has been give Contour Next EZ meter) and FS testing,  2. Insulin action, storage, SE, administration via insulin pen.  3. S/S and treatment of hypoglycemia    Upon discharge use Diabetes Favorites List in Prescription Writer to order the followin. Glucometer as per pt's insurance  2. Alcohol swabs  3. Lancets  4. Test strips as per pt's insurance  5. Lantus Solo star Pen dose TBD  6. Insulin pen size- 4mm  6. Metformin 500 mg 2 tabs  with breakfast and dinner- if pt able to swallow and tolerate.     Upon discharge pt will need the following supplies order form the diabetes favorite list:   No metformin due to present GFR  Continue current insulin regime.  If BG readings remain in current range- Discharge on Lantus or equivalent substitute  18 units every morning, Admelog or equivalent substitute 7 units before each meal.  FU with Endocrinology upon discharge  Upon discharge use Diabetes Favorites List in Prescription Writer to order the followin. Glucometer as per pt's insurance  2. Alcohol swabs  3. Lancets  4. Test strips as per pt's insurance  5. Lantus Solo star Pen - dose TBD  6. Admelog Solo Star Pen- dose TBD  7. Insulin pen size- 4mm

## 2021-03-23 NOTE — PROGRESS NOTE ADULT - ATTENDING COMMENTS
IMPRESSION AND PLAN:  69 y/o woman with pmhx of CAD s/p stent 6 years ago, Dm2, ?CKD, HTN, found to have R internal capsule, R corona radiata. Now with left hemiparesis, dysphasia.    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- will need outpatient follow up  - Avoid nephrotoxic agents  - Monitor BMP- Consider renal consult if worsens    #DM A1C 14  - Continue with Lantus 18U  - CAMRON - 7U pre-prandial  - Holding home oral DM regimen- hold metformin for current GFR  - Unable to start ACEI/ARB for proteinuria due to BROCK/CKD   - Diabetes Education from Diabetes Nurse Practitioner- patient declined learning to do her own fingersticks-  - If BG readings remain in current range- Discharge on Lantus or equivalent substitute  18 units every morning, Admelog or equivalent substitute 7 units before each meal.  FU with Endocrinology upon discharge    #Pain control  - Tylenol PRN    #Insomnia  - increase melatonin from 3mg to 6mg     #GI/Bowel Mgmt   - Continent c/w Senna    #Bladder management  - Continent,     #DVT  - Heparin  - SCDs, TEDs     #Skin:  -No active issues at this time    FEN   - Diet - Dysphagia 2 Mechanical Soft-Thin Liquids; Consistent Carb/DASH    - Dysphagia  SLP - evaluation and treatment    Precautions / PROPHYLAXIS:   - Falls, Cardiac  - ortho: Weight bearing status: WBAT   - Lungs: Aspiration, Incentive Spirometer   - Pressure injury/Skin: Turn Q2hrs while in bed, OOB to Chair, PT/OT/SLP    Dispo: IDT Round 3/23/21  - Lives in private home with , 4 TRESSA and 10 steps inside  - Supervision for eating, grooming, Min A for lower body dressing and toilet transfers  - Goal for Mod I for self care and supervision for shower transfers  - SLP - reduced awareness deficits, mild deficits comprehension  - Tentative DC date 4/2/21 Home with Home Care

## 2021-03-24 LAB
ANION GAP SERPL CALC-SCNC: 7 MMOL/L — SIGNIFICANT CHANGE UP (ref 5–17)
BASOPHILS # BLD AUTO: 0.03 K/UL — SIGNIFICANT CHANGE UP (ref 0–0.2)
BASOPHILS NFR BLD AUTO: 0.3 % — SIGNIFICANT CHANGE UP (ref 0–2)
BUN SERPL-MCNC: 66 MG/DL — HIGH (ref 7–23)
CALCIUM SERPL-MCNC: 8.5 MG/DL — SIGNIFICANT CHANGE UP (ref 8.4–10.5)
CHLORIDE SERPL-SCNC: 103 MMOL/L — SIGNIFICANT CHANGE UP (ref 96–108)
CO2 SERPL-SCNC: 26 MMOL/L — SIGNIFICANT CHANGE UP (ref 22–31)
CREAT SERPL-MCNC: 2.96 MG/DL — HIGH (ref 0.5–1.3)
EOSINOPHIL # BLD AUTO: 1.01 K/UL — HIGH (ref 0–0.5)
EOSINOPHIL NFR BLD AUTO: 9.6 % — HIGH (ref 0–6)
GLUCOSE BLDC GLUCOMTR-MCNC: 147 MG/DL — HIGH (ref 70–99)
GLUCOSE BLDC GLUCOMTR-MCNC: 208 MG/DL — HIGH (ref 70–99)
GLUCOSE BLDC GLUCOMTR-MCNC: 239 MG/DL — HIGH (ref 70–99)
GLUCOSE BLDC GLUCOMTR-MCNC: 86 MG/DL — SIGNIFICANT CHANGE UP (ref 70–99)
GLUCOSE SERPL-MCNC: 235 MG/DL — HIGH (ref 70–99)
HCT VFR BLD CALC: 33.1 % — LOW (ref 34.5–45)
HGB BLD-MCNC: 11.1 G/DL — LOW (ref 11.5–15.5)
IMM GRANULOCYTES NFR BLD AUTO: 1 % — SIGNIFICANT CHANGE UP (ref 0–1.5)
LYMPHOCYTES # BLD AUTO: 2.55 K/UL — SIGNIFICANT CHANGE UP (ref 1–3.3)
LYMPHOCYTES # BLD AUTO: 24.3 % — SIGNIFICANT CHANGE UP (ref 13–44)
MCHC RBC-ENTMCNC: 28 PG — SIGNIFICANT CHANGE UP (ref 27–34)
MCHC RBC-ENTMCNC: 33.5 GM/DL — SIGNIFICANT CHANGE UP (ref 32–36)
MCV RBC AUTO: 83.6 FL — SIGNIFICANT CHANGE UP (ref 80–100)
MONOCYTES # BLD AUTO: 0.83 K/UL — SIGNIFICANT CHANGE UP (ref 0–0.9)
MONOCYTES NFR BLD AUTO: 7.9 % — SIGNIFICANT CHANGE UP (ref 2–14)
NEUTROPHILS # BLD AUTO: 5.96 K/UL — SIGNIFICANT CHANGE UP (ref 1.8–7.4)
NEUTROPHILS NFR BLD AUTO: 56.9 % — SIGNIFICANT CHANGE UP (ref 43–77)
NRBC # BLD: 0 /100 WBCS — SIGNIFICANT CHANGE UP (ref 0–0)
PLATELET # BLD AUTO: 412 K/UL — HIGH (ref 150–400)
POTASSIUM SERPL-MCNC: 4.4 MMOL/L — SIGNIFICANT CHANGE UP (ref 3.5–5.3)
POTASSIUM SERPL-SCNC: 4.4 MMOL/L — SIGNIFICANT CHANGE UP (ref 3.5–5.3)
RBC # BLD: 3.96 M/UL — SIGNIFICANT CHANGE UP (ref 3.8–5.2)
RBC # FLD: 12.7 % — SIGNIFICANT CHANGE UP (ref 10.3–14.5)
SODIUM SERPL-SCNC: 136 MMOL/L — SIGNIFICANT CHANGE UP (ref 135–145)
UUN UR-MCNC: 539 MG/DL — SIGNIFICANT CHANGE UP
WBC # BLD: 10.48 K/UL — SIGNIFICANT CHANGE UP (ref 3.8–10.5)
WBC # FLD AUTO: 10.48 K/UL — SIGNIFICANT CHANGE UP (ref 3.8–10.5)

## 2021-03-24 PROCEDURE — 99232 SBSQ HOSP IP/OBS MODERATE 35: CPT

## 2021-03-24 RX ORDER — INSULIN LISPRO 100/ML
5 VIAL (ML) SUBCUTANEOUS
Refills: 0 | Status: DISCONTINUED | OUTPATIENT
Start: 2021-03-24 | End: 2021-04-03

## 2021-03-24 RX ORDER — INSULIN LISPRO 100/ML
7 VIAL (ML) SUBCUTANEOUS
Refills: 0 | Status: DISCONTINUED | OUTPATIENT
Start: 2021-03-24 | End: 2021-04-03

## 2021-03-24 RX ADMIN — INSULIN GLARGINE 18 UNIT(S): 100 INJECTION, SOLUTION SUBCUTANEOUS at 07:42

## 2021-03-24 RX ADMIN — CARVEDILOL PHOSPHATE 6.25 MILLIGRAM(S): 80 CAPSULE, EXTENDED RELEASE ORAL at 17:05

## 2021-03-24 RX ADMIN — Medication 48 MILLIGRAM(S): at 11:54

## 2021-03-24 RX ADMIN — ATORVASTATIN CALCIUM 80 MILLIGRAM(S): 80 TABLET, FILM COATED ORAL at 21:05

## 2021-03-24 RX ADMIN — Medication 2: at 11:55

## 2021-03-24 RX ADMIN — Medication 90 MILLIGRAM(S): at 05:59

## 2021-03-24 RX ADMIN — Medication 5 UNIT(S): at 11:55

## 2021-03-24 RX ADMIN — CARVEDILOL PHOSPHATE 6.25 MILLIGRAM(S): 80 CAPSULE, EXTENDED RELEASE ORAL at 05:59

## 2021-03-24 RX ADMIN — PANTOPRAZOLE SODIUM 40 MILLIGRAM(S): 20 TABLET, DELAYED RELEASE ORAL at 06:01

## 2021-03-24 RX ADMIN — Medication 81 MILLIGRAM(S): at 11:54

## 2021-03-24 RX ADMIN — HEPARIN SODIUM 5000 UNIT(S): 5000 INJECTION INTRAVENOUS; SUBCUTANEOUS at 17:05

## 2021-03-24 RX ADMIN — Medication 9 MILLIGRAM(S): at 21:10

## 2021-03-24 RX ADMIN — Medication 7 UNIT(S): at 07:42

## 2021-03-24 RX ADMIN — HEPARIN SODIUM 5000 UNIT(S): 5000 INJECTION INTRAVENOUS; SUBCUTANEOUS at 05:59

## 2021-03-24 NOTE — ADVANCED PRACTICE NURSE CONSULT - RECOMMEDATIONS
·	Discussed with Dr. Dipesh Camarena and agreed that pre-lunch Admelog dose should be decreased form 7 units to 5 units.  ·	Reassess as indicated.

## 2021-03-24 NOTE — CONSULT NOTE ADULT - REASON FOR ADMISSION
01.1 Left Body Involvement (Right Brain)

## 2021-03-24 NOTE — CONSULT NOTE ADULT - SUBJECTIVE AND OBJECTIVE BOX
NEPHROLOGY CONSULTATION    CHIEF COMPLAINT: CVA    HPI:  Pt is 69 yo woman with pmh of CAD s/p stent 6 years ago, DM, CKD 4, HTN who presents to the ER at [facility] 3/14 with 1-2 days of generalized weakness. CT head revealed acute cva involving R internal capsule, R corona radiata.   Adm to  acute rehab 3/19. Noted to have worsening renal fx.    ROS:  as above    Allergies:  No Known Allergies    PAST MEDICAL & SURGICAL HISTORY:  Hypertension  DM (diabetes mellitus)  CAD (coronary artery disease)    SOCIAL HISTORY:  negative    FAMILY HISTORY:  No pertinent family history in first degree relatives    MEDICATIONS  (STANDING):  aspirin  chewable 81 milliGRAM(s) Oral daily  atorvastatin 80 milliGRAM(s) Oral at bedtime  carvedilol 6.25 milliGRAM(s) Oral every 12 hours  dextrose 40% Gel 15 Gram(s) Oral once  dextrose 5%. 1000 milliLiter(s) (50 mL/Hr) IV Continuous <Continuous>  dextrose 5%. 1000 milliLiter(s) (100 mL/Hr) IV Continuous <Continuous>  dextrose 50% Injectable 25 Gram(s) IV Push once  dextrose 50% Injectable 12.5 Gram(s) IV Push once  dextrose 50% Injectable 25 Gram(s) IV Push once  fenofibrate Tablet 48 milliGRAM(s) Oral daily  glucagon  Injectable 1 milliGRAM(s) IntraMuscular once  heparin   Injectable 5000 Unit(s) SubCutaneous every 12 hours  insulin glargine Injectable (LANTUS) 18 Unit(s) SubCutaneous every morning  insulin lispro (ADMELOG) corrective regimen sliding scale   SubCutaneous three times a day before meals  insulin lispro (ADMELOG) corrective regimen sliding scale   SubCutaneous at bedtime  insulin lispro Injectable (ADMELOG) 7 Unit(s) SubCutaneous before breakfast  insulin lispro Injectable (ADMELOG) 5 Unit(s) SubCutaneous before lunch  insulin lispro Injectable (ADMELOG) 7 Unit(s) SubCutaneous before dinner  NIFEdipine XL 90 milliGRAM(s) Oral daily  pantoprazole    Tablet 40 milliGRAM(s) Oral before breakfast    Vital Signs Last 24 Hrs  T(C): 36.9 (03-24-21 @ 08:30), Max: 36.9 (03-24-21 @ 08:30)  T(F): 98.5 (03-24-21 @ 08:30), Max: 98.5 (03-24-21 @ 08:30)  HR: 91 (03-24-21 @ 17:04) (87 - 92)  BP: 148/74 (03-24-21 @ 17:04) (148/74 - 172/91)  RR: 16 (03-24-21 @ 08:30) (16 - 16)  SpO2: 100% (03-24-21 @ 08:30) (100% - 100%)    LABS:                        11.1   10.48 )-----------( 412      ( 24 Mar 2021 12:45 )             33.1     03-24    136  |  103  |  66<H>  ----------------------------<  235<H>  4.4   |  26  |  2.96<H>    Ca    8.5      24 Mar 2021 12:45    A/P:    Appears to have DM CKD 4 w/proteinuria and baseline Cr ~ 2 (Cr 2.13 - 3/20/21)  SONO w/small L kidney, no hydro  Etiology of BROCK not obvious  Will check BMP in am  Will order renal serologies  Avoid nephrotoxins  Will follow    [phone]                       NEPHROLOGY CONSULTATION    CHIEF COMPLAINT: CVA    HPI:  Pt is 69 yo woman with pmh of CAD s/p stent 6 years ago, DM, CKD 4, HTN who presents to the ER at [facility] 3/14 with 1-2 days of generalized weakness. CT head revealed acute cva involving R internal capsule, R corona radiata.   Adm to  acute rehab 3/19. Noted to have worsening renal fx. No CP, SOB, N/V/D/C.    ROS:  as above    Allergies:  No Known Allergies    PAST MEDICAL & SURGICAL HISTORY:  Hypertension  DM (diabetes mellitus)  CAD (coronary artery disease)    SOCIAL HISTORY:  negative    FAMILY HISTORY:  No pertinent family history in first degree relatives    MEDICATIONS  (STANDING):  aspirin  chewable 81 milliGRAM(s) Oral daily  atorvastatin 80 milliGRAM(s) Oral at bedtime  carvedilol 6.25 milliGRAM(s) Oral every 12 hours  dextrose 40% Gel 15 Gram(s) Oral once  dextrose 5%. 1000 milliLiter(s) (50 mL/Hr) IV Continuous <Continuous>  dextrose 5%. 1000 milliLiter(s) (100 mL/Hr) IV Continuous <Continuous>  dextrose 50% Injectable 25 Gram(s) IV Push once  dextrose 50% Injectable 12.5 Gram(s) IV Push once  dextrose 50% Injectable 25 Gram(s) IV Push once  fenofibrate Tablet 48 milliGRAM(s) Oral daily  glucagon  Injectable 1 milliGRAM(s) IntraMuscular once  heparin   Injectable 5000 Unit(s) SubCutaneous every 12 hours  insulin glargine Injectable (LANTUS) 18 Unit(s) SubCutaneous every morning  insulin lispro (ADMELOG) corrective regimen sliding scale   SubCutaneous three times a day before meals  insulin lispro (ADMELOG) corrective regimen sliding scale   SubCutaneous at bedtime  insulin lispro Injectable (ADMELOG) 7 Unit(s) SubCutaneous before breakfast  insulin lispro Injectable (ADMELOG) 5 Unit(s) SubCutaneous before lunch  insulin lispro Injectable (ADMELOG) 7 Unit(s) SubCutaneous before dinner  NIFEdipine XL 90 milliGRAM(s) Oral daily  pantoprazole    Tablet 40 milliGRAM(s) Oral before breakfast    Vital Signs Last 24 Hrs  T(C): 36.9 (03-24-21 @ 08:30), Max: 36.9 (03-24-21 @ 08:30)  T(F): 98.5 (03-24-21 @ 08:30), Max: 98.5 (03-24-21 @ 08:30)  HR: 91 (03-24-21 @ 17:04) (87 - 92)  BP: 148/74 (03-24-21 @ 17:04) (148/74 - 172/91)  RR: 16 (03-24-21 @ 08:30) (16 - 16)  SpO2: 100% (03-24-21 @ 08:30) (100% - 100%)    s1s2  clear b/l  soft, ND  no edema    LABS:                        11.1   10.48 )-----------( 412      ( 24 Mar 2021 12:45 )             33.1     03-24    136  |  103  |  66<H>  ----------------------------<  235<H>  4.4   |  26  |  2.96<H>    Ca    8.5      24 Mar 2021 12:45    A/P:    Appears to have DM CKD 4 w/proteinuria and baseline Cr ~ 2 (Cr 2.13 - 3/20/21)  SONO w/small L kidney, no hydro  Etiology of BROCK not obvious  Will check BMP in am  Will order renal serologies  Avoid nephrotoxins  Will follow    [phone]

## 2021-03-24 NOTE — PROGRESS NOTE ADULT - ASSESSMENT
69 yo woman with history of CAD s/p stent 6 years ago, DM II, CKD, HTN admitted to [facility] after acute CVA    #Impaired gait, mobility, ADL  #CVA  -Continue comprehensive rehab program  -Fall precautions  -continue ASA and statin    #DM II  -HA1C 13.9 (3/15)  -Readjust premeal coverage; Before breakfast and dinner Continue with 7units, before lunch change to 5units  -Due to poor renal function, pt should not be on Oral diabetic medications     #BROCK on CKD IV  -Fluctuating Cr w/ baseline Cr of 2-2.1  -Renal US (3/15) Right Kidney - 10.8 cm. No renal mass, hydronephrosis or calculi. Left kidney: 8.6 cm. No hydronephrosis or calculi. Upper pole angiomyolipoma measures 8 mm.  -Avoid nephrotoxic agents  -Renally dose meds  -monitor BMP  -Nephrology consult if continues to worsen    #Transaminitis   - trend LFTs    #CAD s/p stent 8 yrs ago  #HTN  -Continue ASA, statin, fenofibrate  -Continue Nifedipine  -Continue Carvedilol 6.25mg BID   -Continue to monitor BP    #DVT ppx - heparin

## 2021-03-25 LAB
ALBUMIN SERPL ELPH-MCNC: 2.4 G/DL — LOW (ref 3.3–5)
ALP SERPL-CCNC: 63 U/L — SIGNIFICANT CHANGE UP (ref 40–120)
ALT FLD-CCNC: 33 U/L — SIGNIFICANT CHANGE UP (ref 10–45)
ANION GAP SERPL CALC-SCNC: 6 MMOL/L — SIGNIFICANT CHANGE UP (ref 5–17)
APPEARANCE UR: CLEAR — SIGNIFICANT CHANGE UP
AST SERPL-CCNC: 24 U/L — SIGNIFICANT CHANGE UP (ref 10–40)
BACTERIA # UR AUTO: NEGATIVE /HPF — SIGNIFICANT CHANGE UP
BASOPHILS # BLD AUTO: 0.05 K/UL — SIGNIFICANT CHANGE UP (ref 0–0.2)
BASOPHILS NFR BLD AUTO: 0.5 % — SIGNIFICANT CHANGE UP (ref 0–2)
BILIRUB SERPL-MCNC: 0.2 MG/DL — SIGNIFICANT CHANGE UP (ref 0.2–1.2)
BILIRUB UR-MCNC: NEGATIVE — SIGNIFICANT CHANGE UP
BUN SERPL-MCNC: 59 MG/DL — HIGH (ref 7–23)
C3 SERPL-MCNC: 126 MG/DL — SIGNIFICANT CHANGE UP (ref 81–157)
C4 SERPL-MCNC: 35 MG/DL — SIGNIFICANT CHANGE UP (ref 13–39)
CALCIUM SERPL-MCNC: 8.7 MG/DL — SIGNIFICANT CHANGE UP (ref 8.4–10.5)
CHLORIDE SERPL-SCNC: 107 MMOL/L — SIGNIFICANT CHANGE UP (ref 96–108)
CO2 SERPL-SCNC: 27 MMOL/L — SIGNIFICANT CHANGE UP (ref 22–31)
COLOR SPEC: YELLOW — SIGNIFICANT CHANGE UP
CREAT SERPL-MCNC: 2.96 MG/DL — HIGH (ref 0.5–1.3)
DIFF PNL FLD: ABNORMAL
EOSINOPHIL # BLD AUTO: 1.15 K/UL — HIGH (ref 0–0.5)
EOSINOPHIL NFR BLD AUTO: 10.4 % — HIGH (ref 0–6)
EPI CELLS # UR: SIGNIFICANT CHANGE UP
GLUCOSE BLDC GLUCOMTR-MCNC: 123 MG/DL — HIGH (ref 70–99)
GLUCOSE BLDC GLUCOMTR-MCNC: 180 MG/DL — HIGH (ref 70–99)
GLUCOSE BLDC GLUCOMTR-MCNC: 206 MG/DL — HIGH (ref 70–99)
GLUCOSE BLDC GLUCOMTR-MCNC: 208 MG/DL — HIGH (ref 70–99)
GLUCOSE SERPL-MCNC: 193 MG/DL — HIGH (ref 70–99)
GLUCOSE UR QL: 250
HCT VFR BLD CALC: 34 % — LOW (ref 34.5–45)
HGB BLD-MCNC: 10.9 G/DL — LOW (ref 11.5–15.5)
IMM GRANULOCYTES NFR BLD AUTO: 0.8 % — SIGNIFICANT CHANGE UP (ref 0–1.5)
KETONES UR-MCNC: NEGATIVE — SIGNIFICANT CHANGE UP
LEUKOCYTE ESTERASE UR-ACNC: NEGATIVE — SIGNIFICANT CHANGE UP
LYMPHOCYTES # BLD AUTO: 3.5 K/UL — HIGH (ref 1–3.3)
LYMPHOCYTES # BLD AUTO: 31.5 % — SIGNIFICANT CHANGE UP (ref 13–44)
MCHC RBC-ENTMCNC: 27.4 PG — SIGNIFICANT CHANGE UP (ref 27–34)
MCHC RBC-ENTMCNC: 32.1 GM/DL — SIGNIFICANT CHANGE UP (ref 32–36)
MCV RBC AUTO: 85.4 FL — SIGNIFICANT CHANGE UP (ref 80–100)
MONOCYTES # BLD AUTO: 0.86 K/UL — SIGNIFICANT CHANGE UP (ref 0–0.9)
MONOCYTES NFR BLD AUTO: 7.7 % — SIGNIFICANT CHANGE UP (ref 2–14)
NEUTROPHILS # BLD AUTO: 5.45 K/UL — SIGNIFICANT CHANGE UP (ref 1.8–7.4)
NEUTROPHILS NFR BLD AUTO: 49.1 % — SIGNIFICANT CHANGE UP (ref 43–77)
NITRITE UR-MCNC: NEGATIVE — SIGNIFICANT CHANGE UP
NRBC # BLD: 0 /100 WBCS — SIGNIFICANT CHANGE UP (ref 0–0)
PH UR: 6 — SIGNIFICANT CHANGE UP (ref 5–8)
PLATELET # BLD AUTO: 410 K/UL — HIGH (ref 150–400)
POTASSIUM SERPL-MCNC: 4.7 MMOL/L — SIGNIFICANT CHANGE UP (ref 3.5–5.3)
POTASSIUM SERPL-SCNC: 4.7 MMOL/L — SIGNIFICANT CHANGE UP (ref 3.5–5.3)
PROT SERPL-MCNC: 5.5 G/DL — LOW (ref 6–8.3)
PROT SERPL-MCNC: 5.5 G/DL — LOW (ref 6–8.3)
PROT SERPL-MCNC: 6.6 G/DL — SIGNIFICANT CHANGE UP (ref 6–8.3)
PROT UR-MCNC: 500 MG/DL
RBC # BLD: 3.98 M/UL — SIGNIFICANT CHANGE UP (ref 3.8–5.2)
RBC # FLD: 12.6 % — SIGNIFICANT CHANGE UP (ref 10.3–14.5)
RBC CASTS # UR COMP ASSIST: SIGNIFICANT CHANGE UP /HPF (ref 0–4)
SODIUM SERPL-SCNC: 140 MMOL/L — SIGNIFICANT CHANGE UP (ref 135–145)
SP GR SPEC: 1.01 — SIGNIFICANT CHANGE UP (ref 1.01–1.02)
UROBILINOGEN FLD QL: NEGATIVE — SIGNIFICANT CHANGE UP
WBC # BLD: 11.1 K/UL — HIGH (ref 3.8–10.5)
WBC # FLD AUTO: 11.1 K/UL — HIGH (ref 3.8–10.5)
WBC UR QL: SIGNIFICANT CHANGE UP /HPF (ref 0–5)

## 2021-03-25 PROCEDURE — 99233 SBSQ HOSP IP/OBS HIGH 50: CPT

## 2021-03-25 PROCEDURE — 99232 SBSQ HOSP IP/OBS MODERATE 35: CPT

## 2021-03-25 RX ADMIN — Medication 1: at 16:55

## 2021-03-25 RX ADMIN — Medication 7 UNIT(S): at 08:07

## 2021-03-25 RX ADMIN — Medication 90 MILLIGRAM(S): at 06:24

## 2021-03-25 RX ADMIN — SENNA PLUS 2 TABLET(S): 8.6 TABLET ORAL at 21:37

## 2021-03-25 RX ADMIN — Medication 2: at 08:07

## 2021-03-25 RX ADMIN — CARVEDILOL PHOSPHATE 6.25 MILLIGRAM(S): 80 CAPSULE, EXTENDED RELEASE ORAL at 06:24

## 2021-03-25 RX ADMIN — Medication 9 MILLIGRAM(S): at 21:39

## 2021-03-25 RX ADMIN — CARVEDILOL PHOSPHATE 6.25 MILLIGRAM(S): 80 CAPSULE, EXTENDED RELEASE ORAL at 17:21

## 2021-03-25 RX ADMIN — INSULIN GLARGINE 18 UNIT(S): 100 INJECTION, SOLUTION SUBCUTANEOUS at 08:29

## 2021-03-25 RX ADMIN — HEPARIN SODIUM 5000 UNIT(S): 5000 INJECTION INTRAVENOUS; SUBCUTANEOUS at 17:21

## 2021-03-25 RX ADMIN — HEPARIN SODIUM 5000 UNIT(S): 5000 INJECTION INTRAVENOUS; SUBCUTANEOUS at 06:27

## 2021-03-25 RX ADMIN — PANTOPRAZOLE SODIUM 40 MILLIGRAM(S): 20 TABLET, DELAYED RELEASE ORAL at 06:24

## 2021-03-25 RX ADMIN — Medication 5 UNIT(S): at 12:08

## 2021-03-25 RX ADMIN — Medication 7 UNIT(S): at 16:55

## 2021-03-25 RX ADMIN — Medication 81 MILLIGRAM(S): at 12:08

## 2021-03-25 RX ADMIN — ATORVASTATIN CALCIUM 80 MILLIGRAM(S): 80 TABLET, FILM COATED ORAL at 21:37

## 2021-03-25 RX ADMIN — Medication 48 MILLIGRAM(S): at 12:08

## 2021-03-25 NOTE — PROGRESS NOTE ADULT - ASSESSMENT
71 yo woman with history of CAD s/p stent 6 years ago, DM II, CKD, HTN admitted to [facility] after acute CVA    #Impaired gait, mobility, ADL  #CVA  -Continue comprehensive rehab program  -Fall precautions  -continue ASA and statin    #DM II  -HA1C 13.9 (3/15)  -Continue current regimen; Before breakfast and dinner Continue with 7units, before lunch 5units  -Due to poor renal function, pt should not be on Oral diabetic medications     #BROCK on CKD IV  -Fluctuating Cr w/ baseline Cr of 2-2.1  -Renal US (3/15) Right Kidney - 10.8 cm. No renal mass, hydronephrosis or calculi. Left kidney: 8.6 cm. No hydronephrosis or calculi. Upper pole angiomyolipoma measures 8 mm.  -Avoid nephrotoxic agents  -Renally dose meds  -monitor BMP  -Nephrology recommendations noted    #Transaminitis   - trend LFTs    #CAD s/p stent 8 yrs ago  #HTN  -Continue ASA, statin, fenofibrate  -Continue Nifedipine  -Continue Carvedilol 6.25mg BID   -Continue to monitor BP    #DVT ppx - heparin

## 2021-03-26 LAB
% ALBUMIN: 47.8 % — SIGNIFICANT CHANGE UP
% ALPHA 1: 6.2 % — SIGNIFICANT CHANGE UP
% ALPHA 2: 18.4 % — SIGNIFICANT CHANGE UP
% BETA: 15.5 % — SIGNIFICANT CHANGE UP
% GAMMA: 12.1 % — SIGNIFICANT CHANGE UP
ALBUMIN SERPL ELPH-MCNC: 2.6 G/DL — LOW (ref 3.6–5.5)
ALBUMIN/GLOB SERPL ELPH: 0.9 RATIO — SIGNIFICANT CHANGE UP
ALPHA1 GLOB SERPL ELPH-MCNC: 0.3 G/DL — SIGNIFICANT CHANGE UP (ref 0.1–0.4)
ALPHA2 GLOB SERPL ELPH-MCNC: 1 G/DL — SIGNIFICANT CHANGE UP (ref 0.5–1)
ANA TITR SER: NEGATIVE — SIGNIFICANT CHANGE UP
ANION GAP SERPL CALC-SCNC: 7 MMOL/L — SIGNIFICANT CHANGE UP (ref 5–17)
B-GLOBULIN SERPL ELPH-MCNC: 0.9 G/DL — SIGNIFICANT CHANGE UP (ref 0.5–1)
BUN SERPL-MCNC: 59 MG/DL — HIGH (ref 7–23)
CALCIUM SERPL-MCNC: 8.8 MG/DL — SIGNIFICANT CHANGE UP (ref 8.4–10.5)
CHLORIDE SERPL-SCNC: 104 MMOL/L — SIGNIFICANT CHANGE UP (ref 96–108)
CO2 SERPL-SCNC: 26 MMOL/L — SIGNIFICANT CHANGE UP (ref 22–31)
CREAT SERPL-MCNC: 2.99 MG/DL — HIGH (ref 0.5–1.3)
EOSINOPHIL NFR URNS MANUAL: NEGATIVE — SIGNIFICANT CHANGE UP
GAMMA GLOBULIN: 0.7 G/DL — SIGNIFICANT CHANGE UP (ref 0.6–1.6)
GLUCOSE BLDC GLUCOMTR-MCNC: 155 MG/DL — HIGH (ref 70–99)
GLUCOSE BLDC GLUCOMTR-MCNC: 178 MG/DL — HIGH (ref 70–99)
GLUCOSE BLDC GLUCOMTR-MCNC: 79 MG/DL — SIGNIFICANT CHANGE UP (ref 70–99)
GLUCOSE BLDC GLUCOMTR-MCNC: 96 MG/DL — SIGNIFICANT CHANGE UP (ref 70–99)
GLUCOSE SERPL-MCNC: 89 MG/DL — SIGNIFICANT CHANGE UP (ref 70–99)
INTERPRETATION SERPL IFE-IMP: SIGNIFICANT CHANGE UP
POTASSIUM SERPL-MCNC: 4.6 MMOL/L — SIGNIFICANT CHANGE UP (ref 3.5–5.3)
POTASSIUM SERPL-SCNC: 4.6 MMOL/L — SIGNIFICANT CHANGE UP (ref 3.5–5.3)
PROT PATTERN SERPL ELPH-IMP: SIGNIFICANT CHANGE UP
SODIUM SERPL-SCNC: 137 MMOL/L — SIGNIFICANT CHANGE UP (ref 135–145)

## 2021-03-26 PROCEDURE — 99232 SBSQ HOSP IP/OBS MODERATE 35: CPT

## 2021-03-26 RX ORDER — SODIUM CHLORIDE 9 MG/ML
1000 INJECTION, SOLUTION INTRAVENOUS
Refills: 0 | Status: DISCONTINUED | OUTPATIENT
Start: 2021-03-26 | End: 2021-03-28

## 2021-03-26 RX ORDER — TRAZODONE HCL 50 MG
25 TABLET ORAL AT BEDTIME
Refills: 0 | Status: DISCONTINUED | OUTPATIENT
Start: 2021-03-26 | End: 2021-03-29

## 2021-03-26 RX ORDER — INSULIN GLARGINE 100 [IU]/ML
3 INJECTION, SOLUTION SUBCUTANEOUS AT BEDTIME
Refills: 0 | Status: DISCONTINUED | OUTPATIENT
Start: 2021-03-26 | End: 2021-03-29

## 2021-03-26 RX ORDER — LANOLIN ALCOHOL/MO/W.PET/CERES
3 CREAM (GRAM) TOPICAL AT BEDTIME
Refills: 0 | Status: DISCONTINUED | OUTPATIENT
Start: 2021-03-26 | End: 2021-03-29

## 2021-03-26 RX ADMIN — Medication 5 UNIT(S): at 12:32

## 2021-03-26 RX ADMIN — Medication 81 MILLIGRAM(S): at 11:58

## 2021-03-26 RX ADMIN — Medication 7 UNIT(S): at 08:05

## 2021-03-26 RX ADMIN — Medication 25 MILLIGRAM(S): at 21:26

## 2021-03-26 RX ADMIN — INSULIN GLARGINE 3 UNIT(S): 100 INJECTION, SOLUTION SUBCUTANEOUS at 21:43

## 2021-03-26 RX ADMIN — CARVEDILOL PHOSPHATE 6.25 MILLIGRAM(S): 80 CAPSULE, EXTENDED RELEASE ORAL at 17:33

## 2021-03-26 RX ADMIN — CARVEDILOL PHOSPHATE 6.25 MILLIGRAM(S): 80 CAPSULE, EXTENDED RELEASE ORAL at 05:34

## 2021-03-26 RX ADMIN — INSULIN GLARGINE 18 UNIT(S): 100 INJECTION, SOLUTION SUBCUTANEOUS at 08:06

## 2021-03-26 RX ADMIN — Medication 3 MILLIGRAM(S): at 21:25

## 2021-03-26 RX ADMIN — Medication 90 MILLIGRAM(S): at 05:34

## 2021-03-26 RX ADMIN — SODIUM CHLORIDE 75 MILLILITER(S): 9 INJECTION, SOLUTION INTRAVENOUS at 16:34

## 2021-03-26 RX ADMIN — HEPARIN SODIUM 5000 UNIT(S): 5000 INJECTION INTRAVENOUS; SUBCUTANEOUS at 17:34

## 2021-03-26 RX ADMIN — HEPARIN SODIUM 5000 UNIT(S): 5000 INJECTION INTRAVENOUS; SUBCUTANEOUS at 05:34

## 2021-03-26 RX ADMIN — Medication 7 UNIT(S): at 17:35

## 2021-03-26 RX ADMIN — ATORVASTATIN CALCIUM 80 MILLIGRAM(S): 80 TABLET, FILM COATED ORAL at 21:25

## 2021-03-26 RX ADMIN — Medication 1: at 08:06

## 2021-03-26 RX ADMIN — Medication 48 MILLIGRAM(S): at 11:58

## 2021-03-26 RX ADMIN — Medication 1: at 17:34

## 2021-03-26 RX ADMIN — PANTOPRAZOLE SODIUM 40 MILLIGRAM(S): 20 TABLET, DELAYED RELEASE ORAL at 05:34

## 2021-03-26 NOTE — PROGRESS NOTE ADULT - ASSESSMENT
69 yo woman with history of CAD s/p stent 6 years ago, DM II, CKD, HTN admitted to [facility] after acute CVA    #Impaired gait, mobility, ADL  #CVA  -Continue comprehensive rehab program  -Fall precautions  -continue ASA and statin    #DM II  -HA1C 13.9 (3/15)  -fingersticks overall are not controlled. Would start her on Lantus 3units QHS and continue to monitor fingersticks daily. Recommend titrating up dose of Lantus and readjusting AM Lantus accordingly  -Continue current regimen; Before breakfast and dinner Continue with 7units, before lunch 5units  -Due to poor renal function, pt should not be on Oral diabetic medications     #BROCK on CKD IV  -Fluctuating Cr w/ baseline Cr of 2-2.1  -Renal US (3/15) Right Kidney - 10.8 cm. No renal mass, hydronephrosis or calculi. Left kidney: 8.6 cm. No hydronephrosis or calculi. Upper pole angiomyolipoma measures 8 mm.  -Avoid nephrotoxic agents  -Renally dose meds  -monitor BMP  -Nephrology recommendations noted    #Transaminitis   - trend LFTs    #CAD s/p stent 8 yrs ago  #HTN  -Continue ASA, statin, fenofibrate  -Continue Nifedipine  -Continue Carvedilol 6.25mg BID   -Continue to monitor BP    #DVT ppx - heparin

## 2021-03-27 LAB
ANION GAP SERPL CALC-SCNC: 9 MMOL/L — SIGNIFICANT CHANGE UP (ref 5–17)
BUN SERPL-MCNC: 60 MG/DL — HIGH (ref 7–23)
CALCIUM SERPL-MCNC: 8.3 MG/DL — LOW (ref 8.4–10.5)
CHLORIDE SERPL-SCNC: 106 MMOL/L — SIGNIFICANT CHANGE UP (ref 96–108)
CO2 SERPL-SCNC: 24 MMOL/L — SIGNIFICANT CHANGE UP (ref 22–31)
CREAT SERPL-MCNC: 2.67 MG/DL — HIGH (ref 0.5–1.3)
GLUCOSE BLDC GLUCOMTR-MCNC: 143 MG/DL — HIGH (ref 70–99)
GLUCOSE BLDC GLUCOMTR-MCNC: 156 MG/DL — HIGH (ref 70–99)
GLUCOSE BLDC GLUCOMTR-MCNC: 172 MG/DL — HIGH (ref 70–99)
GLUCOSE BLDC GLUCOMTR-MCNC: 274 MG/DL — HIGH (ref 70–99)
GLUCOSE SERPL-MCNC: 260 MG/DL — HIGH (ref 70–99)
POTASSIUM SERPL-MCNC: 4.1 MMOL/L — SIGNIFICANT CHANGE UP (ref 3.5–5.3)
POTASSIUM SERPL-SCNC: 4.1 MMOL/L — SIGNIFICANT CHANGE UP (ref 3.5–5.3)
SODIUM SERPL-SCNC: 139 MMOL/L — SIGNIFICANT CHANGE UP (ref 135–145)

## 2021-03-27 PROCEDURE — 99232 SBSQ HOSP IP/OBS MODERATE 35: CPT

## 2021-03-27 RX ADMIN — Medication 5 UNIT(S): at 12:00

## 2021-03-27 RX ADMIN — Medication 48 MILLIGRAM(S): at 14:59

## 2021-03-27 RX ADMIN — Medication 3: at 07:57

## 2021-03-27 RX ADMIN — HEPARIN SODIUM 5000 UNIT(S): 5000 INJECTION INTRAVENOUS; SUBCUTANEOUS at 05:39

## 2021-03-27 RX ADMIN — HEPARIN SODIUM 5000 UNIT(S): 5000 INJECTION INTRAVENOUS; SUBCUTANEOUS at 17:05

## 2021-03-27 RX ADMIN — Medication 81 MILLIGRAM(S): at 11:59

## 2021-03-27 RX ADMIN — Medication 25 MILLIGRAM(S): at 22:03

## 2021-03-27 RX ADMIN — PANTOPRAZOLE SODIUM 40 MILLIGRAM(S): 20 TABLET, DELAYED RELEASE ORAL at 05:40

## 2021-03-27 RX ADMIN — Medication 1: at 12:00

## 2021-03-27 RX ADMIN — INSULIN GLARGINE 18 UNIT(S): 100 INJECTION, SOLUTION SUBCUTANEOUS at 08:43

## 2021-03-27 RX ADMIN — Medication 3 MILLIGRAM(S): at 22:04

## 2021-03-27 RX ADMIN — CARVEDILOL PHOSPHATE 6.25 MILLIGRAM(S): 80 CAPSULE, EXTENDED RELEASE ORAL at 17:03

## 2021-03-27 RX ADMIN — Medication 1: at 17:03

## 2021-03-27 RX ADMIN — ATORVASTATIN CALCIUM 80 MILLIGRAM(S): 80 TABLET, FILM COATED ORAL at 22:03

## 2021-03-27 RX ADMIN — INSULIN GLARGINE 3 UNIT(S): 100 INJECTION, SOLUTION SUBCUTANEOUS at 22:04

## 2021-03-27 RX ADMIN — Medication 90 MILLIGRAM(S): at 05:40

## 2021-03-27 RX ADMIN — CARVEDILOL PHOSPHATE 6.25 MILLIGRAM(S): 80 CAPSULE, EXTENDED RELEASE ORAL at 05:40

## 2021-03-27 RX ADMIN — Medication 7 UNIT(S): at 07:58

## 2021-03-27 RX ADMIN — Medication 7 UNIT(S): at 17:04

## 2021-03-27 NOTE — PROGRESS NOTE ADULT - ASSESSMENT
69 yo woman with history of CAD s/p stent 6 years ago, DM II, CKD, HTN admitted to [facility] after acute CVA    #Impaired gait, mobility, ADL  #CVA  -Continue comprehensive rehab program  -Fall precautions  -continue ASA and statin    #DM II  -HA1C 13.9 (3/15)  -fingersticks overall are not controlled. Would start her on Lantus 3units QHS and continue to monitor fingersticks daily. Recommend titrating up dose of Lantus and readjusting AM Lantus accordingly  -Continue current regimen; Before breakfast and dinner Continue with 7units, before lunch 5units  -Due to poor renal function, pt should not be on Oral diabetic medications     #BROCK/ ATN? on DM CKD IV  - baseline Cr 2.13 on 3/20/21  - US: small L kidney, no hydro  - UA, lytes  - f/u renal serologies  - avoid nephrotoxins  - continue trial of IVF, some improvement Cr 3/27  - Nephro following    #Transaminitis   - trend LFTs    #CAD s/p stent 8 yrs ago  #HTN  -Continue ASA, statin, fenofibrate  -Continue Nifedipine  -Continue Carvedilol 6.25mg BID     #DVT ppx - heparin

## 2021-03-28 LAB
ANION GAP SERPL CALC-SCNC: 5 MMOL/L — SIGNIFICANT CHANGE UP (ref 5–17)
ANION GAP SERPL CALC-SCNC: 7 MMOL/L — SIGNIFICANT CHANGE UP (ref 5–17)
BUN SERPL-MCNC: 28 MG/DL — HIGH (ref 7–23)
BUN SERPL-MCNC: 74 MG/DL — HIGH (ref 7–23)
CALCIUM SERPL-MCNC: 8.4 MG/DL — SIGNIFICANT CHANGE UP (ref 8.4–10.5)
CALCIUM SERPL-MCNC: 8.5 MG/DL — SIGNIFICANT CHANGE UP (ref 8.4–10.5)
CHLORIDE SERPL-SCNC: 105 MMOL/L — SIGNIFICANT CHANGE UP (ref 96–108)
CHLORIDE SERPL-SCNC: 106 MMOL/L — SIGNIFICANT CHANGE UP (ref 96–108)
CO2 SERPL-SCNC: 23 MMOL/L — SIGNIFICANT CHANGE UP (ref 22–31)
CO2 SERPL-SCNC: 29 MMOL/L — SIGNIFICANT CHANGE UP (ref 22–31)
CREAT SERPL-MCNC: 1.1 MG/DL — SIGNIFICANT CHANGE UP (ref 0.5–1.3)
CREAT SERPL-MCNC: 3.35 MG/DL — HIGH (ref 0.5–1.3)
GLUCOSE BLDC GLUCOMTR-MCNC: 149 MG/DL — HIGH (ref 70–99)
GLUCOSE BLDC GLUCOMTR-MCNC: 187 MG/DL — HIGH (ref 70–99)
GLUCOSE BLDC GLUCOMTR-MCNC: 206 MG/DL — HIGH (ref 70–99)
GLUCOSE BLDC GLUCOMTR-MCNC: 212 MG/DL — HIGH (ref 70–99)
GLUCOSE SERPL-MCNC: 110 MG/DL — HIGH (ref 70–99)
GLUCOSE SERPL-MCNC: 181 MG/DL — HIGH (ref 70–99)
POTASSIUM SERPL-MCNC: 4.4 MMOL/L — SIGNIFICANT CHANGE UP (ref 3.5–5.3)
POTASSIUM SERPL-MCNC: 4.7 MMOL/L — SIGNIFICANT CHANGE UP (ref 3.5–5.3)
POTASSIUM SERPL-SCNC: 4.4 MMOL/L — SIGNIFICANT CHANGE UP (ref 3.5–5.3)
POTASSIUM SERPL-SCNC: 4.7 MMOL/L — SIGNIFICANT CHANGE UP (ref 3.5–5.3)
SODIUM SERPL-SCNC: 135 MMOL/L — SIGNIFICANT CHANGE UP (ref 135–145)
SODIUM SERPL-SCNC: 140 MMOL/L — SIGNIFICANT CHANGE UP (ref 135–145)

## 2021-03-28 PROCEDURE — 99232 SBSQ HOSP IP/OBS MODERATE 35: CPT

## 2021-03-28 RX ORDER — SODIUM CHLORIDE 9 MG/ML
1000 INJECTION INTRAMUSCULAR; INTRAVENOUS; SUBCUTANEOUS
Refills: 0 | Status: DISCONTINUED | OUTPATIENT
Start: 2021-03-28 | End: 2021-03-29

## 2021-03-28 RX ADMIN — Medication 3 MILLIGRAM(S): at 21:19

## 2021-03-28 RX ADMIN — Medication 2: at 17:01

## 2021-03-28 RX ADMIN — INSULIN GLARGINE 18 UNIT(S): 100 INJECTION, SOLUTION SUBCUTANEOUS at 07:49

## 2021-03-28 RX ADMIN — HEPARIN SODIUM 5000 UNIT(S): 5000 INJECTION INTRAVENOUS; SUBCUTANEOUS at 17:01

## 2021-03-28 RX ADMIN — Medication 25 MILLIGRAM(S): at 21:19

## 2021-03-28 RX ADMIN — Medication 5 UNIT(S): at 12:03

## 2021-03-28 RX ADMIN — Medication 90 MILLIGRAM(S): at 05:43

## 2021-03-28 RX ADMIN — Medication 2: at 07:49

## 2021-03-28 RX ADMIN — INSULIN GLARGINE 3 UNIT(S): 100 INJECTION, SOLUTION SUBCUTANEOUS at 21:19

## 2021-03-28 RX ADMIN — CARVEDILOL PHOSPHATE 6.25 MILLIGRAM(S): 80 CAPSULE, EXTENDED RELEASE ORAL at 05:43

## 2021-03-28 RX ADMIN — SODIUM CHLORIDE 75 MILLILITER(S): 9 INJECTION INTRAMUSCULAR; INTRAVENOUS; SUBCUTANEOUS at 23:11

## 2021-03-28 RX ADMIN — Medication 7 UNIT(S): at 07:54

## 2021-03-28 RX ADMIN — PANTOPRAZOLE SODIUM 40 MILLIGRAM(S): 20 TABLET, DELAYED RELEASE ORAL at 05:43

## 2021-03-28 RX ADMIN — CARVEDILOL PHOSPHATE 6.25 MILLIGRAM(S): 80 CAPSULE, EXTENDED RELEASE ORAL at 17:03

## 2021-03-28 RX ADMIN — Medication 81 MILLIGRAM(S): at 12:03

## 2021-03-28 RX ADMIN — Medication 48 MILLIGRAM(S): at 12:03

## 2021-03-28 RX ADMIN — HEPARIN SODIUM 5000 UNIT(S): 5000 INJECTION INTRAVENOUS; SUBCUTANEOUS at 05:43

## 2021-03-28 RX ADMIN — Medication 7 UNIT(S): at 17:00

## 2021-03-28 RX ADMIN — ATORVASTATIN CALCIUM 80 MILLIGRAM(S): 80 TABLET, FILM COATED ORAL at 21:19

## 2021-03-28 NOTE — PROGRESS NOTE ADULT - ASSESSMENT
71 yo woman with history of CAD s/p stent 6 years ago, DM II, CKD, HTN admitted to [facility] after acute CVA    #Impaired gait, mobility, ADL  #CVA  -Continue comprehensive rehab program  -Fall precautions  -continue ASA and statin    #DM II  -HA1C 13.9 (3/15)  -fingersticks overall are not controlled. Would start her on Lantus 3units QHS and continue to monitor fingersticks daily. Recommend titrating up dose of Lantus and readjusting AM Lantus accordingly  -Continue current regimen; Before breakfast and dinner Continue with 7units, before lunch 5units  -Due to poor renal function, pt should not be on Oral diabetic medications     #BROCK/ ATN? on DM CKD IV  - resolved on BMP 3/28, d/c IVF  - baseline Cr 2.13 on 3/20/21  - US: small L kidney, no hydro  - UA, lytes  - f/u renal serologies  - avoid nephrotoxins  - Nephro following    #Transaminitis   - resolved, CMP 3/25    #CAD s/p stent 8 yrs ago  #HTN  -Continue ASA, statin, fenofibrate  -Continue Nifedipine  -Continue Carvedilol 6.25mg BID     #DVT ppx - heparin     69 yo woman with history of CAD s/p stent 6 years ago, DM II, CKD, HTN admitted to [facility] after acute CVA    #Impaired gait, mobility, ADL  #CVA  -Continue comprehensive rehab program  -Fall precautions  -continue ASA and statin    #DM II  -HA1C 13.9 (3/15)  -fingersticks overall are not controlled. Would start her on Lantus 3units QHS and continue to monitor fingersticks daily. Recommend titrating up dose of Lantus and readjusting AM Lantus accordingly  -Continue current regimen; Before breakfast and dinner Continue with 7units, before lunch 5units  -Due to poor renal function, pt should not be on Oral diabetic medications     #BROCK/ ATN? on DM CKD IV  - resolved on BMP 3/28, d/c IVF  - US: small L kidney, no hydro  - avoid nephrotoxins  - Nephro following    #Transaminitis   - resolved, CMP 3/25    #CAD s/p stent 8 yrs ago  #HTN  -Continue ASA, statin, fenofibrate  -Continue Nifedipine  -Continue Carvedilol 6.25mg BID     #DVT ppx - heparin

## 2021-03-29 LAB
ALBUMIN SERPL ELPH-MCNC: 2.1 G/DL — LOW (ref 3.3–5)
ALP SERPL-CCNC: 56 U/L — SIGNIFICANT CHANGE UP (ref 40–120)
ALT FLD-CCNC: 25 U/L — SIGNIFICANT CHANGE UP (ref 10–45)
ANION GAP SERPL CALC-SCNC: 9 MMOL/L — SIGNIFICANT CHANGE UP (ref 5–17)
AST SERPL-CCNC: 16 U/L — SIGNIFICANT CHANGE UP (ref 10–40)
AUTO DIFF PNL BLD: NEGATIVE — SIGNIFICANT CHANGE UP
BASOPHILS # BLD AUTO: 0.05 K/UL — SIGNIFICANT CHANGE UP (ref 0–0.2)
BASOPHILS NFR BLD AUTO: 0.5 % — SIGNIFICANT CHANGE UP (ref 0–2)
BILIRUB SERPL-MCNC: 0.1 MG/DL — LOW (ref 0.2–1.2)
BUN SERPL-MCNC: 72 MG/DL — HIGH (ref 7–23)
C-ANCA SER-ACNC: NEGATIVE — SIGNIFICANT CHANGE UP
CALCIUM SERPL-MCNC: 8.4 MG/DL — SIGNIFICANT CHANGE UP (ref 8.4–10.5)
CHLORIDE SERPL-SCNC: 107 MMOL/L — SIGNIFICANT CHANGE UP (ref 96–108)
CO2 SERPL-SCNC: 23 MMOL/L — SIGNIFICANT CHANGE UP (ref 22–31)
CREAT SERPL-MCNC: 3.47 MG/DL — HIGH (ref 0.5–1.3)
EOSINOPHIL # BLD AUTO: 0.67 K/UL — HIGH (ref 0–0.5)
EOSINOPHIL NFR BLD AUTO: 6.3 % — HIGH (ref 0–6)
GBM IGG SER-ACNC: 3 — SIGNIFICANT CHANGE UP (ref 0–20)
GLUCOSE BLDC GLUCOMTR-MCNC: 115 MG/DL — HIGH (ref 70–99)
GLUCOSE BLDC GLUCOMTR-MCNC: 256 MG/DL — HIGH (ref 70–99)
GLUCOSE BLDC GLUCOMTR-MCNC: 84 MG/DL — SIGNIFICANT CHANGE UP (ref 70–99)
GLUCOSE BLDC GLUCOMTR-MCNC: 91 MG/DL — SIGNIFICANT CHANGE UP (ref 70–99)
GLUCOSE SERPL-MCNC: 259 MG/DL — HIGH (ref 70–99)
HCT VFR BLD CALC: 28.2 % — LOW (ref 34.5–45)
HGB BLD-MCNC: 9.3 G/DL — LOW (ref 11.5–15.5)
IMM GRANULOCYTES NFR BLD AUTO: 1.4 % — SIGNIFICANT CHANGE UP (ref 0–1.5)
LYMPHOCYTES # BLD AUTO: 2.5 K/UL — SIGNIFICANT CHANGE UP (ref 1–3.3)
LYMPHOCYTES # BLD AUTO: 23.6 % — SIGNIFICANT CHANGE UP (ref 13–44)
MCHC RBC-ENTMCNC: 27.3 PG — SIGNIFICANT CHANGE UP (ref 27–34)
MCHC RBC-ENTMCNC: 33 GM/DL — SIGNIFICANT CHANGE UP (ref 32–36)
MCV RBC AUTO: 82.7 FL — SIGNIFICANT CHANGE UP (ref 80–100)
MONOCYTES # BLD AUTO: 0.93 K/UL — HIGH (ref 0–0.9)
MONOCYTES NFR BLD AUTO: 8.8 % — SIGNIFICANT CHANGE UP (ref 2–14)
MPO AB + PR3 PNL SER: SIGNIFICANT CHANGE UP
NEUTROPHILS # BLD AUTO: 6.28 K/UL — SIGNIFICANT CHANGE UP (ref 1.8–7.4)
NEUTROPHILS NFR BLD AUTO: 59.4 % — SIGNIFICANT CHANGE UP (ref 43–77)
NRBC # BLD: 0 /100 WBCS — SIGNIFICANT CHANGE UP (ref 0–0)
P-ANCA SER-ACNC: NEGATIVE — SIGNIFICANT CHANGE UP
PLATELET # BLD AUTO: 399 K/UL — SIGNIFICANT CHANGE UP (ref 150–400)
POTASSIUM SERPL-MCNC: 4.2 MMOL/L — SIGNIFICANT CHANGE UP (ref 3.5–5.3)
POTASSIUM SERPL-SCNC: 4.2 MMOL/L — SIGNIFICANT CHANGE UP (ref 3.5–5.3)
PROT SERPL-MCNC: 5.9 G/DL — LOW (ref 6–8.3)
RBC # BLD: 3.41 M/UL — LOW (ref 3.8–5.2)
RBC # FLD: 12.7 % — SIGNIFICANT CHANGE UP (ref 10.3–14.5)
SODIUM SERPL-SCNC: 139 MMOL/L — SIGNIFICANT CHANGE UP (ref 135–145)
WBC # BLD: 10.58 K/UL — HIGH (ref 3.8–10.5)
WBC # FLD AUTO: 10.58 K/UL — HIGH (ref 3.8–10.5)

## 2021-03-29 PROCEDURE — 99232 SBSQ HOSP IP/OBS MODERATE 35: CPT

## 2021-03-29 RX ORDER — SODIUM CHLORIDE 9 MG/ML
1000 INJECTION INTRAMUSCULAR; INTRAVENOUS; SUBCUTANEOUS
Refills: 0 | Status: DISCONTINUED | OUTPATIENT
Start: 2021-03-29 | End: 2021-03-30

## 2021-03-29 RX ORDER — INSULIN GLARGINE 100 [IU]/ML
10 INJECTION, SOLUTION SUBCUTANEOUS EVERY MORNING
Refills: 0 | Status: DISCONTINUED | OUTPATIENT
Start: 2021-03-29 | End: 2021-04-06

## 2021-03-29 RX ORDER — INSULIN GLARGINE 100 [IU]/ML
10 INJECTION, SOLUTION SUBCUTANEOUS AT BEDTIME
Refills: 0 | Status: DISCONTINUED | OUTPATIENT
Start: 2021-03-29 | End: 2021-04-06

## 2021-03-29 RX ORDER — TRAZODONE HCL 50 MG
25 TABLET ORAL
Refills: 0 | Status: DISCONTINUED | OUTPATIENT
Start: 2021-03-29 | End: 2021-04-06

## 2021-03-29 RX ADMIN — Medication 81 MILLIGRAM(S): at 12:05

## 2021-03-29 RX ADMIN — ATORVASTATIN CALCIUM 80 MILLIGRAM(S): 80 TABLET, FILM COATED ORAL at 21:35

## 2021-03-29 RX ADMIN — HEPARIN SODIUM 5000 UNIT(S): 5000 INJECTION INTRAVENOUS; SUBCUTANEOUS at 06:02

## 2021-03-29 RX ADMIN — Medication 7 UNIT(S): at 17:13

## 2021-03-29 RX ADMIN — Medication 90 MILLIGRAM(S): at 06:02

## 2021-03-29 RX ADMIN — CARVEDILOL PHOSPHATE 6.25 MILLIGRAM(S): 80 CAPSULE, EXTENDED RELEASE ORAL at 06:03

## 2021-03-29 RX ADMIN — INSULIN GLARGINE 18 UNIT(S): 100 INJECTION, SOLUTION SUBCUTANEOUS at 07:53

## 2021-03-29 RX ADMIN — Medication 3: at 07:53

## 2021-03-29 RX ADMIN — SODIUM CHLORIDE 75 MILLILITER(S): 9 INJECTION INTRAMUSCULAR; INTRAVENOUS; SUBCUTANEOUS at 16:51

## 2021-03-29 RX ADMIN — Medication 25 MILLIGRAM(S): at 17:12

## 2021-03-29 RX ADMIN — Medication 5 UNIT(S): at 12:39

## 2021-03-29 RX ADMIN — Medication 7 UNIT(S): at 07:53

## 2021-03-29 RX ADMIN — CARVEDILOL PHOSPHATE 6.25 MILLIGRAM(S): 80 CAPSULE, EXTENDED RELEASE ORAL at 17:12

## 2021-03-29 RX ADMIN — INSULIN GLARGINE 10 UNIT(S): 100 INJECTION, SOLUTION SUBCUTANEOUS at 21:35

## 2021-03-29 RX ADMIN — HEPARIN SODIUM 5000 UNIT(S): 5000 INJECTION INTRAVENOUS; SUBCUTANEOUS at 17:12

## 2021-03-29 NOTE — ADVANCED PRACTICE NURSE CONSULT - RECOMMEDATIONS
1. As of 3/30 change Lantus from Lantus 18 units in morning and 3 units HS to 10 units twice per day.   2. Monitor BG readings  3. Monitor percentage of meal intake.

## 2021-03-29 NOTE — ADVANCED PRACTICE NURSE CONSULT - ASSESSMENT
History per H& P: 69 y/o woman with pmhx of CAD s/p stent 6 years ago, Dm2, ?CKD, HTN who presents to the ER with 1-2 days of generalized weakness.  Reportedly per her son she had fallen and was slightly confused but with no LOC or head strike. She was found to have slurred speech at some point as well. She denies any headache or vision changes. Last normal per son around 10 PM friday. in the ER pt was given asa, ceftriaxone and 2L NS.   CT head revealed acute cva involving R internal capsule, R corona radiata.     Current Hospital Antidiabetic Medication Regime: Lantus 18 units every morning, Admelog 7 units before meals with low dose correction before meals and HS. Current regime achieve adequate glycemic control with BG readings ranging from 91- 217.    Assessment: Pt alert and oriented times 4, pleasant and cooperative. Pt is poor historian stating that she took Metformin three times a day PTA but does not recall dose or who prescribed the medication. She states she was prescribed the Metformin 6 months ago and states she was taking it. Pt denies H/O of smoking, ETOH, or street drugs.   Vision: WNL  Hearing: WNL  Extremities Left sided hemiparesis. Feet intact with appropriate sensation  Eating habits: Prefers not to eat breakfast and has large portions of rice and beans at lunch and dinner. Poor intake of vegetables. Lacks knowledge regarding CC, low fat, low sodium diet and food label reading.  BG Testing: Pt has not tested BG PTA and is refusing to learn stating that her left hemiparesis will prevent her from performing task. Pt states her son and spouse will perform for her. Educated pt on the importance of self management and difficulties that can occur when a PWD depends on others to perform self management tasks that the pt is able to learn/perform. This did not impress pt and she continued to refuse to learn how to perform FSBG testing. Pt states she would like the "patch" to check her BG. Educated pt on FS Tristian CGM and need for her to see Endocrinologist upon discharge to obtain prescription and education on FS Tristian CGM.   Insulin administration: Pt was not on Insulin PTA and refusing to learn how to self administer Insulin. Pt states her son and spouse will perform for her. Educated pt on the importance of self management and difficulties that can occur when a PWD depends on others to perform self management tasks that the pt is able to learn/perform. This did not impress pt and she continued to refuse to learn how to administer own Insulin.      Education provided:   1. Overview of T2DM disease process, management, BG goals, and complication related to persistent hyperglycemia/ poorly controlled diabetes.   2. Importance of follow with Endocrinologist upon discharge- Pt states she lives in [place]- provided ct information for Dr. Chasity English- [address] [facility] [id number]- [phone] and [facility] in [place]- [phone].   2. S/S of hypoglycemia and treatment per 15/15 rule.  3. Importance of annual vision testing.  4. Check feet daily, routine FU with podiatry  5. Brief overview of food that contain carbohydrates and principles of CC diet.  VM left for pt sonBabar Verdin to contact me for Diabetes Education.    
BG readings ranging from 149- 256 with pre- lunch BG 86 today.   Current insulin regime- Lantus 18 units in morning and 3 units HS, Nutritional insulin before breakfast- 8 units, before lunch -5 units, before dinner- 5 units plus moderation correction scale ac and HS. Discussed case with Dr. Dipesh Camarena. 
Pattern of lower BG noted before dinner: 3/21/21- 121, 3/22/21- 91, 3/23/21- 82.  Discussed with Dr. Dipesh Camarena and agreed that pre-lunch Admelog dose should be decreased form 7 units to 5 units.  No change in Pre-breakfast and pre-dinner Admelog dose which remains at 7 units.  No change in Lantus 18 units daily .  No change in pre-meal or bedtime corrections.

## 2021-03-29 NOTE — PROGRESS NOTE ADULT - ASSESSMENT
69 yo woman with history of CAD s/p stent 6 years ago, DM II, CKD, HTN admitted to [facility] after acute CVA    #Impaired gait, mobility, ADL  #CVA  -Continue comprehensive rehab program  -Fall precautions  -continue ASA and statin    #DM II  -HA1C 13.9 (3/15)  -Readjust insulin regimen; Lantus 10units QAM + QHS  -Continue current regimen; Before breakfast and dinner Continue with 7units, before lunch 5units  -Due to poor renal function, pt should not be on Oral diabetic medications     #BROCK on CKD IV  -Fluctuating Cr w/ baseline Cr of 2-2.1  -Renal US (3/15) Right Kidney - 10.8 cm. No renal mass, hydronephrosis or calculi. Left kidney: 8.6 cm. No hydronephrosis or calculi. Upper pole angiomyolipoma measures 8 mm.  -Avoid nephrotoxic agents  -Renally dose meds  -monitor BMP  -Nephrology recommendations noted    #Transaminitis   - trend LFTs    #CAD s/p stent 8 yrs ago  #HTN  -Continue ASA, statin, fenofibrate  -Continue Nifedipine  -Continue Carvedilol 6.25mg BID   -Continue to monitor BP    #DVT ppx - heparin

## 2021-03-30 LAB
ANION GAP SERPL CALC-SCNC: 12 MMOL/L — SIGNIFICANT CHANGE UP (ref 5–17)
BUN SERPL-MCNC: 71 MG/DL — HIGH (ref 7–23)
CALCIUM SERPL-MCNC: 8.7 MG/DL — SIGNIFICANT CHANGE UP (ref 8.4–10.5)
CHLORIDE SERPL-SCNC: 107 MMOL/L — SIGNIFICANT CHANGE UP (ref 96–108)
CO2 SERPL-SCNC: 22 MMOL/L — SIGNIFICANT CHANGE UP (ref 22–31)
CREAT ?TM UR-MCNC: 29 MG/DL — SIGNIFICANT CHANGE UP
CREAT SERPL-MCNC: 2.98 MG/DL — HIGH (ref 0.5–1.3)
GLUCOSE BLDC GLUCOMTR-MCNC: 109 MG/DL — HIGH (ref 70–99)
GLUCOSE BLDC GLUCOMTR-MCNC: 115 MG/DL — HIGH (ref 70–99)
GLUCOSE BLDC GLUCOMTR-MCNC: 163 MG/DL — HIGH (ref 70–99)
GLUCOSE BLDC GLUCOMTR-MCNC: 171 MG/DL — HIGH (ref 70–99)
GLUCOSE SERPL-MCNC: 143 MG/DL — HIGH (ref 70–99)
POTASSIUM SERPL-MCNC: 4.7 MMOL/L — SIGNIFICANT CHANGE UP (ref 3.5–5.3)
POTASSIUM SERPL-SCNC: 4.7 MMOL/L — SIGNIFICANT CHANGE UP (ref 3.5–5.3)
PROT ?TM UR-MCNC: 198 MG/DL — HIGH (ref 0–12)
PROT/CREAT UR-RTO: 6.8 RATIO — HIGH (ref 0–0.2)
SODIUM SERPL-SCNC: 141 MMOL/L — SIGNIFICANT CHANGE UP (ref 135–145)

## 2021-03-30 PROCEDURE — 99232 SBSQ HOSP IP/OBS MODERATE 35: CPT

## 2021-03-30 RX ORDER — SODIUM CHLORIDE 9 MG/ML
1000 INJECTION INTRAMUSCULAR; INTRAVENOUS; SUBCUTANEOUS
Refills: 0 | Status: DISCONTINUED | OUTPATIENT
Start: 2021-03-30 | End: 2021-03-31

## 2021-03-30 RX ADMIN — CARVEDILOL PHOSPHATE 6.25 MILLIGRAM(S): 80 CAPSULE, EXTENDED RELEASE ORAL at 05:44

## 2021-03-30 RX ADMIN — Medication 81 MILLIGRAM(S): at 12:03

## 2021-03-30 RX ADMIN — INSULIN GLARGINE 10 UNIT(S): 100 INJECTION, SOLUTION SUBCUTANEOUS at 08:03

## 2021-03-30 RX ADMIN — ATORVASTATIN CALCIUM 80 MILLIGRAM(S): 80 TABLET, FILM COATED ORAL at 21:35

## 2021-03-30 RX ADMIN — Medication 7 UNIT(S): at 08:04

## 2021-03-30 RX ADMIN — Medication 1: at 08:04

## 2021-03-30 RX ADMIN — INSULIN GLARGINE 10 UNIT(S): 100 INJECTION, SOLUTION SUBCUTANEOUS at 21:36

## 2021-03-30 RX ADMIN — CARVEDILOL PHOSPHATE 6.25 MILLIGRAM(S): 80 CAPSULE, EXTENDED RELEASE ORAL at 17:26

## 2021-03-30 RX ADMIN — HEPARIN SODIUM 5000 UNIT(S): 5000 INJECTION INTRAVENOUS; SUBCUTANEOUS at 17:26

## 2021-03-30 RX ADMIN — HEPARIN SODIUM 5000 UNIT(S): 5000 INJECTION INTRAVENOUS; SUBCUTANEOUS at 05:43

## 2021-03-30 RX ADMIN — SODIUM CHLORIDE 75 MILLILITER(S): 9 INJECTION INTRAMUSCULAR; INTRAVENOUS; SUBCUTANEOUS at 17:27

## 2021-03-30 RX ADMIN — Medication 7 UNIT(S): at 17:26

## 2021-03-30 RX ADMIN — Medication 90 MILLIGRAM(S): at 05:44

## 2021-03-30 RX ADMIN — Medication 5 UNIT(S): at 12:02

## 2021-03-30 RX ADMIN — Medication 25 MILLIGRAM(S): at 17:26

## 2021-03-30 RX ADMIN — Medication 1: at 12:02

## 2021-03-30 NOTE — PROVIDER CONTACT NOTE (OTHER) - RECOMMENDATIONS
Lantus 10u administered as per MD ordered
Notified to MD
Will see the BMP results today, then will decide
Lantus 3u administered given as per MD ordered

## 2021-03-30 NOTE — PROGRESS NOTE ADULT - ASSESSMENT
70F with PMH CAD s/p stent (6 years ago), Type 2 DM, CKD Stage 4, HTN admitted to [facility] after acute CVA    #CVA  -Continue comprehensive rehab program  -Continue ASA and statin    #DM Type II  -HA1C 13.9 (3/15)  -Continue Lantus 10  units qAM, Lantus 10 units qHS, 7 units pre-meal breakfast and dinner, 5 units pre-meal at lunch, low dose insulin sliding scale  -Due to poor renal function, pt should not be on Oral diabetic medications   -Hypoglycemia protocol, accu-cheks  -Blood glucose goal in hospital setting 100-180    #BROCK on CKD IV  -Fluctuating Cr w/ baseline Cr of 2-2.1  -Renal US (3/15) Right Kidney - 10.8 cm. No renal mass, hydronephrosis or calculi. Left kidney: 8.6 cm. No hydronephrosis or calculi. Upper pole angiomyolipoma measures 8 mm.  -Avoid nephrotoxic agents  -Gentle IVF per renal  -monitor BMP  -Nephrology recommendations appreciated    #Transaminitis   - trend LFTs    #CAD s/p stent  #HTN  -Continue ASA, statin, fenofibrate  -Continue Nifedipine  -Continue Carvedilol 6.25mg BID   -Monitor vitals    #DVT ppx - heparin

## 2021-03-30 NOTE — CHART NOTE - NSCHARTNOTEFT_GEN_A_CORE
Nutrition Follow Up Note  Hospital Course   (Per Electronic Medical Record)    Source:  Patient [X]  Medical Record [X]      Diet:   Consistent Carbohydrate, Low Sodium, Renal Diet w/ Thin Liquids  Tolerates Diet Consistency Well  No Chewing/Swallowing Difficulties  No Recent Nausea, Vomiting, Diarrhea or Constipation (as Per Patient)  Consumes % of Meals (as Per Documentation) - States Good PO Intake/Appetite   Education Provided on Blood Glucose Management & Renal Diet     Enteral/Parenteral Nutrition: Not Applicable    Current Weight: 108.9lb on 3/19  Obtain New Weight  Obtain Weights Weekly     Pertinent Medications: MEDICATIONS  (STANDING):  aspirin  chewable 81 milliGRAM(s) Oral daily  atorvastatin 80 milliGRAM(s) Oral at bedtime  carvedilol 6.25 milliGRAM(s) Oral every 12 hours  dextrose 40% Gel 15 Gram(s) Oral once  dextrose 5%. 1000 milliLiter(s) (50 mL/Hr) IV Continuous <Continuous>  dextrose 5%. 1000 milliLiter(s) (100 mL/Hr) IV Continuous <Continuous>  dextrose 50% Injectable 25 Gram(s) IV Push once  dextrose 50% Injectable 12.5 Gram(s) IV Push once  dextrose 50% Injectable 25 Gram(s) IV Push once  glucagon  Injectable 1 milliGRAM(s) IntraMuscular once  heparin   Injectable 5000 Unit(s) SubCutaneous every 12 hours  insulin glargine Injectable (LANTUS) 10 Unit(s) SubCutaneous every morning  insulin glargine Injectable (LANTUS) 10 Unit(s) SubCutaneous at bedtime  insulin lispro (ADMELOG) corrective regimen sliding scale   SubCutaneous three times a day before meals  insulin lispro (ADMELOG) corrective regimen sliding scale   SubCutaneous at bedtime  insulin lispro Injectable (ADMELOG) 7 Unit(s) SubCutaneous before breakfast  insulin lispro Injectable (ADMELOG) 5 Unit(s) SubCutaneous before lunch  insulin lispro Injectable (ADMELOG) 7 Unit(s) SubCutaneous before dinner  NIFEdipine XL 90 milliGRAM(s) Oral daily  sodium chloride 0.9%. 1000 milliLiter(s) (75 mL/Hr) IV Continuous <Continuous>  traZODone 25 milliGRAM(s) Oral <User Schedule>    MEDICATIONS  (PRN):  acetaminophen   Tablet .. 650 milliGRAM(s) Oral every 6 hours PRN Temp greater or equal to 38C (100.4F), Mild Pain (1 - 3)  senna 2 Tablet(s) Oral at bedtime PRN Constipation    Pertinent Labs:  03-29 Na139 mmol/L Glu 259 mg/dL<H> K+ 4.2 mmol/L Cr  3.47 mg/dL<H> BUN 72 mg/dL<H> 03-29 Alb 2.1 g/dL<L> 03-14 Chol 263 mg/dL<H> LDL --    HDL 34 mg/dL<L> Trig 396 mg/dL<H>    POCT (over Last 3 Days) - Ranging from     Skin: No Pressure Ulcers     Edema: None Noted     Last Bowel Movement: on 3/29    Estimated Needs:   [X] No Change Since Previous Assessment    Previous Nutrition Diagnosis:   Altered Nutrition Related Labwork     Nutrition Diagnosis is [X] Ongoing - Education Provided on Blood Glucose Management & Renal Diet    New Nutrition Diagnosis: [X] Not Applicable    Interventions:   1. Education Provided on Blood Glucose Management & Renal Diet  2. Recommend Continue Nutrition Plan of Care     Monitoring & Evaluation:   [X] Weights   [X] PO Intake   [X] Skin Integrity   [X] Follow Up (Per Protocol)  [X] Tolerance to Diet Prescription   [X] Other: Labs & POCT    Registered Dietitian/Nutritionist Remains Available.  Benjamin Strange RDN    Pager # [phone]  Phone# [phone]
Nutrition Follow Up Note  Hospital Course   (Per Electronic Medical Record)    Source:  Patient [X]  Medical Record [X]      Diet:   Consistent Carbohydrate Renal Low Sodium Diet w/ Thin Liquids  Tolerates Diet Consistency Well  No Chewing/Swallowing Difficulties  No Recent Nausea, Vomiting, Diarrhea or Constipation (as Per Patient)  Consumes 100% of Meals (as Per Documentation) - States Good PO Intake/Appetite   Education Provided on Blood Glucose Management     Enteral/Parenteral Nutrition: Not Applicable    Current Weight: 108.9lb on 3/19  Obtain New Weight  Obtain Weights Weekly     Pertinent Medications: MEDICATIONS  (STANDING):  aspirin  chewable 81 milliGRAM(s) Oral daily  atorvastatin 80 milliGRAM(s) Oral at bedtime  carvedilol 6.25 milliGRAM(s) Oral every 12 hours  dextrose 40% Gel 15 Gram(s) Oral once  dextrose 5%. 1000 milliLiter(s) (50 mL/Hr) IV Continuous <Continuous>  dextrose 5%. 1000 milliLiter(s) (100 mL/Hr) IV Continuous <Continuous>  dextrose 50% Injectable 25 Gram(s) IV Push once  dextrose 50% Injectable 12.5 Gram(s) IV Push once  dextrose 50% Injectable 25 Gram(s) IV Push once  fenofibrate Tablet 48 milliGRAM(s) Oral daily  glucagon  Injectable 1 milliGRAM(s) IntraMuscular once  heparin   Injectable 5000 Unit(s) SubCutaneous every 12 hours  insulin glargine Injectable (LANTUS) 18 Unit(s) SubCutaneous every morning  insulin lispro (ADMELOG) corrective regimen sliding scale   SubCutaneous three times a day before meals  insulin lispro (ADMELOG) corrective regimen sliding scale   SubCutaneous at bedtime  insulin lispro Injectable (ADMELOG) 7 Unit(s) SubCutaneous before breakfast  insulin lispro Injectable (ADMELOG) 5 Unit(s) SubCutaneous before lunch  insulin lispro Injectable (ADMELOG) 7 Unit(s) SubCutaneous before dinner  NIFEdipine XL 90 milliGRAM(s) Oral daily  pantoprazole    Tablet 40 milliGRAM(s) Oral before breakfast    MEDICATIONS  (PRN):  acetaminophen   Tablet .. 650 milliGRAM(s) Oral every 6 hours PRN Temp greater or equal to 38C (100.4F), Mild Pain (1 - 3)  melatonin 9 milliGRAM(s) Oral at bedtime PRN Insomnia  senna 2 Tablet(s) Oral at bedtime PRN Constipation    Pertinent Labs:  03-22 Na138 mmol/L Glu 189 mg/dL<H> K+ 4.3 mmol/L Cr  2.80 mg/dL<H> BUN 52 mg/dL<H> 03-18 Phos 5.0 mg/dL<H> 03-22 Alb 2.1 g/dL<L> 03-14 Chol 263 mg/dL<H> LDL --    HDL 34 mg/dL<L> Trig 396 mg/dL<H>    Skin: No Pressure Ulcers     Edema: None Noted      Last Bowel Movement: on 3/22    Estimated Needs:   [X] No Change Since Previous Assessment    Previous Nutrition Diagnosis:   Altered Nutrition Related Labwork     Nutrition Diagnosis is [X] Ongoing - Education Provided on Blood Glucose Management     New Nutrition Diagnosis: [X] Not Applicable    Interventions:   1. Education Provided on Blood Glucose Management   2. Recommend Continue Nutrition Plan of Care     Monitoring & Evaluation:   [X] Weights   [X] PO Intake   [X] Skin Integrity   [X] Follow Up (Per Protocol)  [X] Tolerance to Diet Prescription   [X] Other: Labs & POCT    Registered Dietitian/Nutritionist Remains Available.  Benjamin Strange RDN    Pager # [phone]  Phone# [phone]

## 2021-03-30 NOTE — PROVIDER CONTACT NOTE (OTHER) - BACKGROUND
S/P CVA
S/P CVA and received first dose of covid vaccine
S/p CVA and elevated BUN and creatine
S/P CVA with left side weakness

## 2021-03-31 LAB
ANION GAP SERPL CALC-SCNC: 12 MMOL/L — SIGNIFICANT CHANGE UP (ref 5–17)
BUN SERPL-MCNC: 66 MG/DL — HIGH (ref 7–23)
CALCIUM SERPL-MCNC: 8.6 MG/DL — SIGNIFICANT CHANGE UP (ref 8.4–10.5)
CHLORIDE SERPL-SCNC: 108 MMOL/L — SIGNIFICANT CHANGE UP (ref 96–108)
CK SERPL-CCNC: 73 U/L — SIGNIFICANT CHANGE UP (ref 25–170)
CO2 SERPL-SCNC: 22 MMOL/L — SIGNIFICANT CHANGE UP (ref 22–31)
CREAT SERPL-MCNC: 2.98 MG/DL — HIGH (ref 0.5–1.3)
GLUCOSE BLDC GLUCOMTR-MCNC: 136 MG/DL — HIGH (ref 70–99)
GLUCOSE BLDC GLUCOMTR-MCNC: 146 MG/DL — HIGH (ref 70–99)
GLUCOSE BLDC GLUCOMTR-MCNC: 178 MG/DL — HIGH (ref 70–99)
GLUCOSE BLDC GLUCOMTR-MCNC: 72 MG/DL — SIGNIFICANT CHANGE UP (ref 70–99)
GLUCOSE SERPL-MCNC: 115 MG/DL — HIGH (ref 70–99)
POTASSIUM SERPL-MCNC: 4.1 MMOL/L — SIGNIFICANT CHANGE UP (ref 3.5–5.3)
POTASSIUM SERPL-SCNC: 4.1 MMOL/L — SIGNIFICANT CHANGE UP (ref 3.5–5.3)
SARS-COV-2 RNA SPEC QL NAA+PROBE: SIGNIFICANT CHANGE UP
SODIUM SERPL-SCNC: 142 MMOL/L — SIGNIFICANT CHANGE UP (ref 135–145)

## 2021-03-31 PROCEDURE — 99232 SBSQ HOSP IP/OBS MODERATE 35: CPT

## 2021-03-31 RX ADMIN — INSULIN GLARGINE 10 UNIT(S): 100 INJECTION, SOLUTION SUBCUTANEOUS at 22:29

## 2021-03-31 RX ADMIN — CARVEDILOL PHOSPHATE 6.25 MILLIGRAM(S): 80 CAPSULE, EXTENDED RELEASE ORAL at 05:31

## 2021-03-31 RX ADMIN — CARVEDILOL PHOSPHATE 6.25 MILLIGRAM(S): 80 CAPSULE, EXTENDED RELEASE ORAL at 17:27

## 2021-03-31 RX ADMIN — ATORVASTATIN CALCIUM 80 MILLIGRAM(S): 80 TABLET, FILM COATED ORAL at 21:53

## 2021-03-31 RX ADMIN — Medication 7 UNIT(S): at 07:55

## 2021-03-31 RX ADMIN — Medication 25 MILLIGRAM(S): at 17:27

## 2021-03-31 RX ADMIN — Medication 7 UNIT(S): at 16:44

## 2021-03-31 RX ADMIN — HEPARIN SODIUM 5000 UNIT(S): 5000 INJECTION INTRAVENOUS; SUBCUTANEOUS at 17:29

## 2021-03-31 RX ADMIN — HEPARIN SODIUM 5000 UNIT(S): 5000 INJECTION INTRAVENOUS; SUBCUTANEOUS at 05:31

## 2021-03-31 RX ADMIN — Medication 90 MILLIGRAM(S): at 05:32

## 2021-03-31 RX ADMIN — Medication 81 MILLIGRAM(S): at 11:55

## 2021-03-31 RX ADMIN — INSULIN GLARGINE 10 UNIT(S): 100 INJECTION, SOLUTION SUBCUTANEOUS at 07:55

## 2021-03-31 RX ADMIN — Medication 1: at 16:44

## 2021-03-31 RX ADMIN — Medication 5 UNIT(S): at 11:55

## 2021-03-31 NOTE — PROGRESS NOTE ADULT - ASSESSMENT
70F with PMH CAD s/p stent (6 years ago), Type 2 DM, CKD Stage 4, HTN admitted to [facility] after acute CVA    #CVA  -Continue comprehensive rehab program  -Continue ASA and statin    #DM Type II  -HA1C 13.9 (3/15)  -Continue Lantus 10 units qAM, Lantus 10 units qHS, 7 units pre-meal breakfast and dinner, 5 units pre-meal at lunch, low dose insulin sliding scale  -Due to poor renal function, pt should not be on Oral diabetic medications   -Hypoglycemia protocol, accu-cheks  -Blood glucose goal in hospital setting 100-180    #BROCK on CKD IV  -Fluctuating Cr with baseline Cr of 2-2.1  -Avoid nephrotoxic agents  -monitor BMP  -Nephrology recommendations appreciated. Stop IVF per renal    #CAD s/p stent  #HTN  -Continue ASA, statin, fenofibrate  -Continue Nifedipine  -Continue Carvedilol 6.25mg BID   -Monitor vitals    #DVT ppx - heparin

## 2021-04-01 LAB
BASOPHILS # BLD AUTO: 0.03 K/UL — SIGNIFICANT CHANGE UP (ref 0–0.2)
BASOPHILS NFR BLD AUTO: 0.3 % — SIGNIFICANT CHANGE UP (ref 0–2)
EOSINOPHIL # BLD AUTO: 0.94 K/UL — HIGH (ref 0–0.5)
EOSINOPHIL NFR BLD AUTO: 9.3 % — HIGH (ref 0–6)
GLUCOSE BLDC GLUCOMTR-MCNC: 156 MG/DL — HIGH (ref 70–99)
GLUCOSE BLDC GLUCOMTR-MCNC: 186 MG/DL — HIGH (ref 70–99)
GLUCOSE BLDC GLUCOMTR-MCNC: 83 MG/DL — SIGNIFICANT CHANGE UP (ref 70–99)
HCT VFR BLD CALC: 31.3 % — LOW (ref 34.5–45)
HGB BLD-MCNC: 10.2 G/DL — LOW (ref 11.5–15.5)
IMM GRANULOCYTES NFR BLD AUTO: 1 % — SIGNIFICANT CHANGE UP (ref 0–1.5)
LYMPHOCYTES # BLD AUTO: 2.76 K/UL — SIGNIFICANT CHANGE UP (ref 1–3.3)
LYMPHOCYTES # BLD AUTO: 27.3 % — SIGNIFICANT CHANGE UP (ref 13–44)
MCHC RBC-ENTMCNC: 27.6 PG — SIGNIFICANT CHANGE UP (ref 27–34)
MCHC RBC-ENTMCNC: 32.6 GM/DL — SIGNIFICANT CHANGE UP (ref 32–36)
MCV RBC AUTO: 84.8 FL — SIGNIFICANT CHANGE UP (ref 80–100)
MONOCYTES # BLD AUTO: 0.75 K/UL — SIGNIFICANT CHANGE UP (ref 0–0.9)
MONOCYTES NFR BLD AUTO: 7.4 % — SIGNIFICANT CHANGE UP (ref 2–14)
NEUTROPHILS # BLD AUTO: 5.52 K/UL — SIGNIFICANT CHANGE UP (ref 1.8–7.4)
NEUTROPHILS NFR BLD AUTO: 54.7 % — SIGNIFICANT CHANGE UP (ref 43–77)
NRBC # BLD: 0 /100 WBCS — SIGNIFICANT CHANGE UP (ref 0–0)
PLATELET # BLD AUTO: 458 K/UL — HIGH (ref 150–400)
RBC # BLD: 3.69 M/UL — LOW (ref 3.8–5.2)
RBC # FLD: 13 % — SIGNIFICANT CHANGE UP (ref 10.3–14.5)
WBC # BLD: 10.1 K/UL — SIGNIFICANT CHANGE UP (ref 3.8–10.5)
WBC # FLD AUTO: 10.1 K/UL — SIGNIFICANT CHANGE UP (ref 3.8–10.5)

## 2021-04-01 PROCEDURE — 99232 SBSQ HOSP IP/OBS MODERATE 35: CPT

## 2021-04-01 RX ORDER — CARVEDILOL PHOSPHATE 80 MG/1
12.5 CAPSULE, EXTENDED RELEASE ORAL EVERY 12 HOURS
Refills: 0 | Status: DISCONTINUED | OUTPATIENT
Start: 2021-04-01 | End: 2021-04-03

## 2021-04-01 RX ADMIN — HEPARIN SODIUM 5000 UNIT(S): 5000 INJECTION INTRAVENOUS; SUBCUTANEOUS at 17:12

## 2021-04-01 RX ADMIN — Medication 25 MILLIGRAM(S): at 17:10

## 2021-04-01 RX ADMIN — Medication 7 UNIT(S): at 08:50

## 2021-04-01 RX ADMIN — Medication 81 MILLIGRAM(S): at 11:44

## 2021-04-01 RX ADMIN — Medication 5 UNIT(S): at 11:44

## 2021-04-01 RX ADMIN — INSULIN GLARGINE 10 UNIT(S): 100 INJECTION, SOLUTION SUBCUTANEOUS at 21:45

## 2021-04-01 RX ADMIN — HEPARIN SODIUM 5000 UNIT(S): 5000 INJECTION INTRAVENOUS; SUBCUTANEOUS at 05:42

## 2021-04-01 RX ADMIN — ATORVASTATIN CALCIUM 80 MILLIGRAM(S): 80 TABLET, FILM COATED ORAL at 21:46

## 2021-04-01 RX ADMIN — Medication 90 MILLIGRAM(S): at 05:42

## 2021-04-01 RX ADMIN — Medication 1: at 11:45

## 2021-04-01 RX ADMIN — Medication 1: at 08:49

## 2021-04-01 RX ADMIN — CARVEDILOL PHOSPHATE 6.25 MILLIGRAM(S): 80 CAPSULE, EXTENDED RELEASE ORAL at 05:42

## 2021-04-01 RX ADMIN — CARVEDILOL PHOSPHATE 12.5 MILLIGRAM(S): 80 CAPSULE, EXTENDED RELEASE ORAL at 17:10

## 2021-04-01 NOTE — PROVIDER CONTACT NOTE (OTHER) - REASON
glucose 83
Blood sugar- 91mg/dl. Clarification For Lantus 10U
IVF 1/2 NS @ 75cc completed x 1000ml
Patient Refused Covid Swab
Blood sugar- 96mm/hg

## 2021-04-01 NOTE — PROGRESS NOTE ADULT - ASSESSMENT
70F with PMH CAD s/p stent (6 years ago), Type 2 DM, CKD Stage 4, HTN admitted to [facility] after acute CVA    #CVA  -Continue comprehensive rehab program  -Continue ASA and statin  - fall precautions    #DM Type II  -HA1C 13.9 (3/15)  -Continue Lantus 10 units qAM, Lantus 10 units qHS, 7 units pre-meal breakfast and dinner, 5 units pre-meal at lunch, low dose insulin sliding scale  -Due to poor renal function, pt should not be on Oral metformin   -Hypoglycemia protocol, accu-cheks  -Blood glucose goal in hospital setting 100-180    #BROCK/ATN on CKD IV  -Fluctuating Cr with baseline Cr of 2-2.1  -Avoid nephrotoxic agents  -monitor BMP  -Nephrology recommendations appreciated. Stop IVF per renal. Renal f/u OP    #CAD s/p stent  #HTN  -Continue ASA, statin, fenofibrate  -Continue Nifedipine  -Continue Carvedilol 6.25mg BID   -Monitor vitals    #DVT ppx - heparin      d/w primary team

## 2021-04-01 NOTE — PROVIDER CONTACT NOTE (OTHER) - SITUATION
Dinner glucose 83.
IVF 1/2 NS @ 75cc completed x 1000ml
Notified MD about  Blood sugar level- 91mg/dl
Patient Refused Covid Swab.
Patient is alert and oriented x4. Able to make needs known. Blood sugar 96mm/hg

## 2021-04-01 NOTE — PROVIDER CONTACT NOTE (OTHER) - ACTION/TREATMENT ORDERED:
MD notified. 7 units standing insulin held, will continue to monitor.
Lantus 10u administered as per MD ordered
No new order
Will continue as per MD ordered
Lantus 3u administered given as per MD ordered

## 2021-04-02 LAB
ANION GAP SERPL CALC-SCNC: 12 MMOL/L — SIGNIFICANT CHANGE UP (ref 5–17)
BUN SERPL-MCNC: 81 MG/DL — HIGH (ref 7–23)
CALCIUM SERPL-MCNC: 8.5 MG/DL — SIGNIFICANT CHANGE UP (ref 8.4–10.5)
CHLORIDE SERPL-SCNC: 107 MMOL/L — SIGNIFICANT CHANGE UP (ref 96–108)
CO2 SERPL-SCNC: 21 MMOL/L — LOW (ref 22–31)
CREAT SERPL-MCNC: 3.09 MG/DL — HIGH (ref 0.5–1.3)
GLUCOSE BLDC GLUCOMTR-MCNC: 133 MG/DL — HIGH (ref 70–99)
GLUCOSE BLDC GLUCOMTR-MCNC: 155 MG/DL — HIGH (ref 70–99)
GLUCOSE BLDC GLUCOMTR-MCNC: 157 MG/DL — HIGH (ref 70–99)
GLUCOSE BLDC GLUCOMTR-MCNC: 97 MG/DL — SIGNIFICANT CHANGE UP (ref 70–99)
GLUCOSE SERPL-MCNC: 107 MG/DL — HIGH (ref 70–99)
POTASSIUM SERPL-MCNC: 4.4 MMOL/L — SIGNIFICANT CHANGE UP (ref 3.5–5.3)
POTASSIUM SERPL-SCNC: 4.4 MMOL/L — SIGNIFICANT CHANGE UP (ref 3.5–5.3)
SODIUM SERPL-SCNC: 140 MMOL/L — SIGNIFICANT CHANGE UP (ref 135–145)

## 2021-04-02 PROCEDURE — 99232 SBSQ HOSP IP/OBS MODERATE 35: CPT

## 2021-04-02 RX ADMIN — HEPARIN SODIUM 5000 UNIT(S): 5000 INJECTION INTRAVENOUS; SUBCUTANEOUS at 05:10

## 2021-04-02 RX ADMIN — Medication 5 UNIT(S): at 12:06

## 2021-04-02 RX ADMIN — Medication 7 UNIT(S): at 08:06

## 2021-04-02 RX ADMIN — Medication 81 MILLIGRAM(S): at 12:08

## 2021-04-02 RX ADMIN — CARVEDILOL PHOSPHATE 12.5 MILLIGRAM(S): 80 CAPSULE, EXTENDED RELEASE ORAL at 17:14

## 2021-04-02 RX ADMIN — Medication 25 MILLIGRAM(S): at 17:16

## 2021-04-02 RX ADMIN — Medication 90 MILLIGRAM(S): at 05:10

## 2021-04-02 RX ADMIN — Medication 1: at 12:07

## 2021-04-02 RX ADMIN — CARVEDILOL PHOSPHATE 12.5 MILLIGRAM(S): 80 CAPSULE, EXTENDED RELEASE ORAL at 05:10

## 2021-04-02 RX ADMIN — INSULIN GLARGINE 10 UNIT(S): 100 INJECTION, SOLUTION SUBCUTANEOUS at 08:06

## 2021-04-02 RX ADMIN — INSULIN GLARGINE 10 UNIT(S): 100 INJECTION, SOLUTION SUBCUTANEOUS at 22:46

## 2021-04-02 RX ADMIN — ATORVASTATIN CALCIUM 80 MILLIGRAM(S): 80 TABLET, FILM COATED ORAL at 22:46

## 2021-04-02 RX ADMIN — Medication 7 UNIT(S): at 17:14

## 2021-04-02 RX ADMIN — HEPARIN SODIUM 5000 UNIT(S): 5000 INJECTION INTRAVENOUS; SUBCUTANEOUS at 17:14

## 2021-04-02 NOTE — PROGRESS NOTE ADULT - ASSESSMENT
70F with PMH CAD s/p stent (6 years ago), Type 2 DM, CKD Stage 4, HTN admitted to [facility] after acute CVA    #CVA  -Continue comprehensive rehab program  -Continue ASA and statin  - fall precautions    #DM Type II  -HA1C 13.9 (3/15)  -Continue Lantus 10 units qAM, Lantus 10 units qHS, 7 units pre-meal breakfast and dinner, 5 units pre-meal at lunch, low dose insulin sliding scale  -Due to poor renal function, pt should not be on Oral metformin   -Hypoglycemia protocol, accu-cheks  -Blood glucose goal in hospital setting 100-180    #BROCK/ATN on CKD IV  -Fluctuating Cr with baseline Cr of 2-2.1  -Avoid nephrotoxic agents  -monitor BMP  -Nephrology recommendations appreciated. Renal f/u OP    #CAD s/p stent  #HTN  -Continue ASA, statin, fenofibrate  -Continue Nifedipine  -Continue Carvedilol 6.25mg BID   -Monitor vitals    HTN  - BP better controlled  - c/w carvedilol, nifedipine  - DASH/TLC  - monitor BP and adjust dose    #DVT ppx - heparin      d/w primary team

## 2021-04-03 LAB
ANION GAP SERPL CALC-SCNC: 9 MMOL/L — SIGNIFICANT CHANGE UP (ref 5–17)
BUN SERPL-MCNC: 75 MG/DL — HIGH (ref 7–23)
CALCIUM SERPL-MCNC: 8.9 MG/DL — SIGNIFICANT CHANGE UP (ref 8.4–10.5)
CHLORIDE SERPL-SCNC: 110 MMOL/L — HIGH (ref 96–108)
CO2 SERPL-SCNC: 23 MMOL/L — SIGNIFICANT CHANGE UP (ref 22–31)
CREAT SERPL-MCNC: 2.81 MG/DL — HIGH (ref 0.5–1.3)
GLUCOSE BLDC GLUCOMTR-MCNC: 114 MG/DL — HIGH (ref 70–99)
GLUCOSE BLDC GLUCOMTR-MCNC: 168 MG/DL — HIGH (ref 70–99)
GLUCOSE BLDC GLUCOMTR-MCNC: 218 MG/DL — HIGH (ref 70–99)
GLUCOSE BLDC GLUCOMTR-MCNC: 92 MG/DL — SIGNIFICANT CHANGE UP (ref 70–99)
GLUCOSE SERPL-MCNC: 86 MG/DL — SIGNIFICANT CHANGE UP (ref 70–99)
POTASSIUM SERPL-MCNC: 4.1 MMOL/L — SIGNIFICANT CHANGE UP (ref 3.5–5.3)
POTASSIUM SERPL-SCNC: 4.1 MMOL/L — SIGNIFICANT CHANGE UP (ref 3.5–5.3)
SODIUM SERPL-SCNC: 142 MMOL/L — SIGNIFICANT CHANGE UP (ref 135–145)

## 2021-04-03 PROCEDURE — 99232 SBSQ HOSP IP/OBS MODERATE 35: CPT

## 2021-04-03 RX ORDER — CARVEDILOL PHOSPHATE 80 MG/1
12.5 CAPSULE, EXTENDED RELEASE ORAL EVERY 12 HOURS
Refills: 0 | Status: DISCONTINUED | OUTPATIENT
Start: 2021-04-03 | End: 2021-04-06

## 2021-04-03 RX ORDER — CARVEDILOL PHOSPHATE 80 MG/1
25 CAPSULE, EXTENDED RELEASE ORAL EVERY 12 HOURS
Refills: 0 | Status: DISCONTINUED | OUTPATIENT
Start: 2021-04-03 | End: 2021-04-03

## 2021-04-03 RX ORDER — INSULIN LISPRO 100/ML
5 VIAL (ML) SUBCUTANEOUS
Refills: 0 | Status: DISCONTINUED | OUTPATIENT
Start: 2021-04-03 | End: 2021-04-06

## 2021-04-03 RX ADMIN — Medication 2: at 11:52

## 2021-04-03 RX ADMIN — CARVEDILOL PHOSPHATE 12.5 MILLIGRAM(S): 80 CAPSULE, EXTENDED RELEASE ORAL at 17:05

## 2021-04-03 RX ADMIN — Medication 25 MILLIGRAM(S): at 17:06

## 2021-04-03 RX ADMIN — Medication 81 MILLIGRAM(S): at 11:52

## 2021-04-03 RX ADMIN — HEPARIN SODIUM 5000 UNIT(S): 5000 INJECTION INTRAVENOUS; SUBCUTANEOUS at 17:06

## 2021-04-03 RX ADMIN — HEPARIN SODIUM 5000 UNIT(S): 5000 INJECTION INTRAVENOUS; SUBCUTANEOUS at 05:44

## 2021-04-03 RX ADMIN — Medication 5 UNIT(S): at 11:51

## 2021-04-03 RX ADMIN — ATORVASTATIN CALCIUM 80 MILLIGRAM(S): 80 TABLET, FILM COATED ORAL at 22:02

## 2021-04-03 RX ADMIN — INSULIN GLARGINE 10 UNIT(S): 100 INJECTION, SOLUTION SUBCUTANEOUS at 08:11

## 2021-04-03 RX ADMIN — INSULIN GLARGINE 10 UNIT(S): 100 INJECTION, SOLUTION SUBCUTANEOUS at 22:02

## 2021-04-03 RX ADMIN — Medication 90 MILLIGRAM(S): at 05:44

## 2021-04-03 RX ADMIN — CARVEDILOL PHOSPHATE 12.5 MILLIGRAM(S): 80 CAPSULE, EXTENDED RELEASE ORAL at 05:44

## 2021-04-03 RX ADMIN — Medication 5 UNIT(S): at 17:05

## 2021-04-03 NOTE — PROGRESS NOTE ADULT - ASSESSMENT
70F with PMH CAD s/p stent (6 years ago), Type 2 DM, CKD Stage 4, HTN admitted to [facility] after acute CVA    #CVA  -Continue comprehensive rehab program  -Continue ASA and statin  - fall precautions    #DM Type II  -HA1C 13.9 (3/15)  -Continue Lantus 10 units qAM, Lantus 10 units qHS, 7 units pre-meal breakfast and dinner, 5 units pre-meal at lunch, low dose insulin sliding scale  -Due to poor renal function, pt should not be on Oral metformin   -Hypoglycemia protocol, accu-cheks  -Blood glucose goal in hospital setting 100-180    #BROCK/ATN on CKD IV  -Fluctuating Cr with baseline Cr of 2-2.1  -Avoid nephrotoxic agents  -monitor BMP  -Nephrology recommendations appreciated. Renal f/u OP    #CAD s/p stent  #HTN: uncontrolled  -Continue ASA, statin, fenofibrate  -Continue Nifedipine 90.  - increased Carvedilol 6.25mg BID to 12.5 BID on 4/1--> 25 BID today 4/3  -Monitor vitals  - DASH/TLC  - monitor BP and adjust dose    #DVT ppx - heparin      d/w primary team         70F with PMH CAD s/p stent (6 years ago), Type 2 DM, CKD Stage 4, HTN admitted to [facility] after acute CVA    #CVA  -Continue comprehensive rehab program  -Continue ASA and statin  - fall precautions    #DM Type II  -HA1C 13.9 (3/15)  - on Lantus 10 units qAM, Lantus 10 units qHS, 7 units pre-meal breakfast and dinner, 5 units pre-meal at lunch,   - BS low in evening. Changed admelog to 5 unit TIDAC. 4/3  - Target BS in hospital setting 100-180 to avoid hypoglycemia  - low dose insulin sliding scale  -Due to poor renal function, pt should not be on Oral metformin   -Hypoglycemia protocol, accu-cheks    #BROCK/ATN on CKD IV  -Fluctuating Cr with baseline Cr of 2-2.1  -Avoid nephrotoxic agents  -monitor BMP  -Nephrology recommendations appreciated. Renal f/u OP    #CAD s/p stent  #HTN: uncontrolled  -Continue ASA, statin, fenofibrate  -Continue Nifedipine 90.  - increased Carvedilol 6.25mg BID to 12.5 BID on 4/1--> 25 BID today 4/3  -Monitor vitals  - DASH/TLC  - monitor BP and adjust dose    #DVT ppx - heparin      d/w primary team         70F with PMH CAD s/p stent (6 years ago), Type 2 DM, CKD Stage 4, HTN admitted to [facility] after acute CVA    #CVA  -Continue comprehensive rehab program  -Continue ASA and statin  - fall precautions    #DM Type II  -HA1C 13.9 (3/15)  - on Lantus 10 units qAM, Lantus 10 units qHS, 7 units pre-meal breakfast and dinner, 5 units pre-meal at lunch,   - BS low in evening. Changed admelog to 5 unit TIDAC. 4/3  - Target BS in hospital setting 100-180 to avoid hypoglycemia  - low dose insulin sliding scale  -Due to poor renal function, pt should not be on Oral metformin   -Hypoglycemia protocol, accu-cheks    #BROCK/ATN on CKD IV  -Fluctuating Cr with baseline Cr of 2-2.1  -Avoid nephrotoxic agents  -monitor BMP  -Nephrology recommendations appreciated. Renal f/u OP    #CAD s/p stent  #HTN: uncontrolled  -Continue ASA, statin, fenofibrate  -Continue Nifedipine 90.  - increased Carvedilol 6.25mg BID to 12.5 BID on 4/1  -Monitor vitals  - DASH/TLC  - monitor BP and adjust dose    #DVT ppx - heparin      d/w primary team

## 2021-04-04 LAB
ANION GAP SERPL CALC-SCNC: 11 MMOL/L — SIGNIFICANT CHANGE UP (ref 5–17)
BUN SERPL-MCNC: 76 MG/DL — HIGH (ref 7–23)
CALCIUM SERPL-MCNC: 8.3 MG/DL — LOW (ref 8.4–10.5)
CHLORIDE SERPL-SCNC: 106 MMOL/L — SIGNIFICANT CHANGE UP (ref 96–108)
CO2 SERPL-SCNC: 19 MMOL/L — LOW (ref 22–31)
CREAT SERPL-MCNC: 2.88 MG/DL — HIGH (ref 0.5–1.3)
GLUCOSE BLDC GLUCOMTR-MCNC: 143 MG/DL — HIGH (ref 70–99)
GLUCOSE BLDC GLUCOMTR-MCNC: 144 MG/DL — HIGH (ref 70–99)
GLUCOSE BLDC GLUCOMTR-MCNC: 189 MG/DL — HIGH (ref 70–99)
GLUCOSE BLDC GLUCOMTR-MCNC: 95 MG/DL — SIGNIFICANT CHANGE UP (ref 70–99)
GLUCOSE SERPL-MCNC: 198 MG/DL — HIGH (ref 70–99)
POTASSIUM SERPL-MCNC: 4.1 MMOL/L — SIGNIFICANT CHANGE UP (ref 3.5–5.3)
POTASSIUM SERPL-SCNC: 4.1 MMOL/L — SIGNIFICANT CHANGE UP (ref 3.5–5.3)
SODIUM SERPL-SCNC: 136 MMOL/L — SIGNIFICANT CHANGE UP (ref 135–145)

## 2021-04-04 PROCEDURE — 99232 SBSQ HOSP IP/OBS MODERATE 35: CPT

## 2021-04-04 RX ADMIN — Medication 90 MILLIGRAM(S): at 05:22

## 2021-04-04 RX ADMIN — Medication 5 UNIT(S): at 07:44

## 2021-04-04 RX ADMIN — Medication 81 MILLIGRAM(S): at 12:39

## 2021-04-04 RX ADMIN — ATORVASTATIN CALCIUM 80 MILLIGRAM(S): 80 TABLET, FILM COATED ORAL at 21:15

## 2021-04-04 RX ADMIN — Medication 5 UNIT(S): at 12:39

## 2021-04-04 RX ADMIN — HEPARIN SODIUM 5000 UNIT(S): 5000 INJECTION INTRAVENOUS; SUBCUTANEOUS at 05:22

## 2021-04-04 RX ADMIN — CARVEDILOL PHOSPHATE 12.5 MILLIGRAM(S): 80 CAPSULE, EXTENDED RELEASE ORAL at 05:22

## 2021-04-04 RX ADMIN — Medication 1: at 07:44

## 2021-04-04 RX ADMIN — Medication 5 UNIT(S): at 17:00

## 2021-04-04 RX ADMIN — INSULIN GLARGINE 10 UNIT(S): 100 INJECTION, SOLUTION SUBCUTANEOUS at 21:15

## 2021-04-04 RX ADMIN — INSULIN GLARGINE 10 UNIT(S): 100 INJECTION, SOLUTION SUBCUTANEOUS at 07:45

## 2021-04-04 RX ADMIN — HEPARIN SODIUM 5000 UNIT(S): 5000 INJECTION INTRAVENOUS; SUBCUTANEOUS at 17:01

## 2021-04-04 RX ADMIN — CARVEDILOL PHOSPHATE 12.5 MILLIGRAM(S): 80 CAPSULE, EXTENDED RELEASE ORAL at 17:01

## 2021-04-04 RX ADMIN — Medication 25 MILLIGRAM(S): at 17:01

## 2021-04-04 NOTE — PROGRESS NOTE ADULT - ASSESSMENT
70F with PMH CAD s/p stent (6 years ago), Type 2 DM, CKD Stage 4, HTN admitted to [facility] after acute CVA    #CVA  -Continue comprehensive rehab program  -Continue ASA and statin  - fall precautions    #DM Type II  -HA1C 13.9 (3/15)  - on Lantus 10 units qAM, Lantus 10 units qHS, 7 units pre-meal breakfast and dinner, 5 units pre-meal at lunch,   - BS low in evening. Changed admelog to 5 unit TIDAC. 4/3  - Target BS in hospital setting 100-180 to avoid hypoglycemia  - low dose insulin sliding scale  -Due to poor renal function, pt should not be on Oral metformin   -Hypoglycemia protocol, accu-cheks    #BROCK/ATN on CKD IV  -Fluctuating Cr with baseline Cr of 2-2.1  -Avoid nephrotoxic agents  -monitor BMP  -Nephrology recommendations appreciated. Renal f/u OP    #CAD s/p stent  #HTN: uncontrolled  -Continue ASA, statin, fenofibrate  -Continue Nifedipine 90.  - increased Carvedilol 6.25mg BID to 12.5 BID on 4/1  -Monitor vitals  - DASH/TLC  - monitor BP and adjust dose    #DVT ppx - heparin

## 2021-04-05 ENCOUNTER — TRANSCRIPTION ENCOUNTER (OUTPATIENT)
Age: 71
End: 2021-04-05

## 2021-04-05 LAB
BASOPHILS # BLD AUTO: 0.03 K/UL — SIGNIFICANT CHANGE UP (ref 0–0.2)
BASOPHILS NFR BLD AUTO: 0.3 % — SIGNIFICANT CHANGE UP (ref 0–2)
EOSINOPHIL # BLD AUTO: 0.68 K/UL — HIGH (ref 0–0.5)
EOSINOPHIL NFR BLD AUTO: 6.5 % — HIGH (ref 0–6)
GLUCOSE BLDC GLUCOMTR-MCNC: 125 MG/DL — HIGH (ref 70–99)
GLUCOSE BLDC GLUCOMTR-MCNC: 192 MG/DL — HIGH (ref 70–99)
GLUCOSE BLDC GLUCOMTR-MCNC: 79 MG/DL — SIGNIFICANT CHANGE UP (ref 70–99)
HCT VFR BLD CALC: 27.6 % — LOW (ref 34.5–45)
HGB BLD-MCNC: 9.1 G/DL — LOW (ref 11.5–15.5)
IMM GRANULOCYTES NFR BLD AUTO: 0.5 % — SIGNIFICANT CHANGE UP (ref 0–1.5)
LYMPHOCYTES # BLD AUTO: 3.77 K/UL — HIGH (ref 1–3.3)
LYMPHOCYTES # BLD AUTO: 36.2 % — SIGNIFICANT CHANGE UP (ref 13–44)
MCHC RBC-ENTMCNC: 27.2 PG — SIGNIFICANT CHANGE UP (ref 27–34)
MCHC RBC-ENTMCNC: 33 GM/DL — SIGNIFICANT CHANGE UP (ref 32–36)
MCV RBC AUTO: 82.6 FL — SIGNIFICANT CHANGE UP (ref 80–100)
MONOCYTES # BLD AUTO: 0.75 K/UL — SIGNIFICANT CHANGE UP (ref 0–0.9)
MONOCYTES NFR BLD AUTO: 7.2 % — SIGNIFICANT CHANGE UP (ref 2–14)
NEUTROPHILS # BLD AUTO: 5.13 K/UL — SIGNIFICANT CHANGE UP (ref 1.8–7.4)
NEUTROPHILS NFR BLD AUTO: 49.3 % — SIGNIFICANT CHANGE UP (ref 43–77)
NRBC # BLD: 0 /100 WBCS — SIGNIFICANT CHANGE UP (ref 0–0)
PLATELET # BLD AUTO: 467 K/UL — HIGH (ref 150–400)
RBC # BLD: 3.34 M/UL — LOW (ref 3.8–5.2)
RBC # FLD: 13.1 % — SIGNIFICANT CHANGE UP (ref 10.3–14.5)
WBC # BLD: 10.41 K/UL — SIGNIFICANT CHANGE UP (ref 3.8–10.5)
WBC # FLD AUTO: 10.41 K/UL — SIGNIFICANT CHANGE UP (ref 3.8–10.5)

## 2021-04-05 PROCEDURE — 99232 SBSQ HOSP IP/OBS MODERATE 35: CPT

## 2021-04-05 RX ORDER — CARVEDILOL PHOSPHATE 80 MG/1
1 CAPSULE, EXTENDED RELEASE ORAL
Qty: 60 | Refills: 0
Start: 2021-04-05 | End: 2021-05-04

## 2021-04-05 RX ORDER — INSULIN GLARGINE 100 [IU]/ML
10 INJECTION, SOLUTION SUBCUTANEOUS
Qty: 1 | Refills: 0
Start: 2021-04-05 | End: 2021-05-04

## 2021-04-05 RX ORDER — NIFEDIPINE 30 MG
1 TABLET, EXTENDED RELEASE 24 HR ORAL
Qty: 30 | Refills: 0
Start: 2021-04-05 | End: 2021-05-04

## 2021-04-05 RX ORDER — ISOPROPYL ALCOHOL, BENZOCAINE .7; .06 ML/ML; ML/ML
1 SWAB TOPICAL
Qty: 100 | Refills: 1
Start: 2021-04-05 | End: 2021-05-24

## 2021-04-05 RX ORDER — INSULIN LISPRO 100/ML
5 VIAL (ML) SUBCUTANEOUS
Qty: 1 | Refills: 0
Start: 2021-04-05 | End: 2021-05-04

## 2021-04-05 RX ORDER — ATORVASTATIN CALCIUM 80 MG/1
1 TABLET, FILM COATED ORAL
Qty: 30 | Refills: 0
Start: 2021-04-05 | End: 2021-05-04

## 2021-04-05 RX ORDER — FENOFIBRATE,MICRONIZED 130 MG
1 CAPSULE ORAL
Qty: 0 | Refills: 0 | DISCHARGE

## 2021-04-05 RX ORDER — ASPIRIN/CALCIUM CARB/MAGNESIUM 324 MG
1 TABLET ORAL
Qty: 30 | Refills: 0
Start: 2021-04-05 | End: 2021-05-04

## 2021-04-05 RX ORDER — SENNA PLUS 8.6 MG/1
2 TABLET ORAL
Qty: 0 | Refills: 0 | DISCHARGE
Start: 2021-04-05

## 2021-04-05 RX ADMIN — Medication 5 UNIT(S): at 12:03

## 2021-04-05 RX ADMIN — HEPARIN SODIUM 5000 UNIT(S): 5000 INJECTION INTRAVENOUS; SUBCUTANEOUS at 05:22

## 2021-04-05 RX ADMIN — INSULIN GLARGINE 10 UNIT(S): 100 INJECTION, SOLUTION SUBCUTANEOUS at 21:09

## 2021-04-05 RX ADMIN — CARVEDILOL PHOSPHATE 12.5 MILLIGRAM(S): 80 CAPSULE, EXTENDED RELEASE ORAL at 05:22

## 2021-04-05 RX ADMIN — HEPARIN SODIUM 5000 UNIT(S): 5000 INJECTION INTRAVENOUS; SUBCUTANEOUS at 17:21

## 2021-04-05 RX ADMIN — CARVEDILOL PHOSPHATE 12.5 MILLIGRAM(S): 80 CAPSULE, EXTENDED RELEASE ORAL at 17:21

## 2021-04-05 RX ADMIN — Medication 81 MILLIGRAM(S): at 12:03

## 2021-04-05 RX ADMIN — Medication 90 MILLIGRAM(S): at 05:22

## 2021-04-05 RX ADMIN — ATORVASTATIN CALCIUM 80 MILLIGRAM(S): 80 TABLET, FILM COATED ORAL at 21:11

## 2021-04-05 RX ADMIN — Medication 25 MILLIGRAM(S): at 17:21

## 2021-04-05 NOTE — DISCHARGE NOTE PROVIDER - CARE PROVIDER_API CALL
Libman, Richard B (MD)  Neurology; Vascular Neurology  [address], [address]  [place] [address]  Phone: [phone]  Fax: [phone]  Follow Up Time:     Lillian Koch)  EndocrinologyMetabDiabetes  [address] [place] [address]  Phone: [phone]  Fax: [phone]  Follow Up Time:     Sissy Boyce  INTERNAL MEDICINE  [address] [place] [address]  Phone: [phone]  Fax: [phone]  Follow Up Time:     Emmett Velarde)  PhysicalRehab Medicine  [address] [place] [address]  Phone: [phone]  Fax: [phone]  Follow Up Time:    Libman, Richard B (MD)  Neurology; Vascular Neurology  [address], [address]  [place] [address]  Phone: [phone]  Fax: [phone]  Follow Up Time:     Lillian Koch)  EndocrinologyMetabDiabetes  [address] [place] [address]  Phone: [phone]  Fax: [phone]  Follow Up Time:     Sissy Boyce  INTERNAL MEDICINE  [address] [place] [address]  Phone: [phone]  Fax: [phone]  Follow Up Time:     Emmett Velarde)  PhysicalRehab Medicine  [address] [place] [address]  Phone: [phone]  Fax: [phone]  Follow Up Time:     Bridget Hemphill)  Medicine  [address], [address]  [place], [place] [address]  Phone: [phone]  Fax: [phone]  Follow Up Time:

## 2021-04-05 NOTE — PROVIDER CONTACT NOTE (MEDICATION) - ACTION/TREATMENT ORDERED:
repeated 4 OZ apple juice, came up to 86. refuse to take any gel, wants to eat ,had dinner .blood sugar came up to 160. patient feeling better.pre meal held.
Ok to hold
Dr. Blevins made aware Premeal insulin held as per orders

## 2021-04-05 NOTE — DISCHARGE NOTE PROVIDER - NSDCCPCAREPLAN_GEN_ALL_CORE_FT
PRINCIPAL DISCHARGE DIAGNOSIS  Diagnosis: Cerebrovascular accident  Assessment and Plan of Treatment: You were found to have a stroke on the MRI that was performed during your hospital stay before rehab. Please continue all your medications as prescribed and follow up with your Neurologist and your primary care doctor within 1 week of discharge from acute rehab. If you experience any symptoms of weakness, numbness, slurred speech, or any other symptoms please call your primary care doctor or go to the Emergency Room. Continue to monitor your blood sugar and blood pressure.      SECONDARY DISCHARGE DIAGNOSES  Diagnosis: Diabetes mellitus  Assessment and Plan of Treatment: Please take your insulin as prescribed and check your blood sugars at home and keep a record for your endocrinologist. Please follow up with your Endocrinologist and Primary Care Doctor within 1 month of discharge from acute rehab. Eat a consistent carbohydrate diet.  Please check your fingersticks every morning or if you are not feeling well.  If your fingerstick is >300 mg/dl x 3 or more readings, please contact your doctor. . If your fingerstick low, <70 mg/dl and/or you have symptoms of very low blood sugar, FIRST drink 1/2 cup of apple juice, (or take 4 glucose tabs/tube of glucose gel) and recheck fingerstick in 15 minutes.  Repeat these steps until blood sugar is above 100 mg/dl, IF NECESSARY.  Then call provider your doctor to discuss low blood sugar.    Diagnosis: Hypertension  Assessment and Plan of Treatment: Please take your medications as prescribed and follow up with your primary care doctor.    Diagnosis: CAD (coronary artery disease)  Assessment and Plan of Treatment: Continue medications as prescribed, do not stop unless instructed by your physician.  Continue low salt, fat, cholesterol and carbohydrate diet. Follow up with cardiologist and primary care physician's routine appointment.    Diagnosis: Chronic kidney disease, unspecified CKD stage  Assessment and Plan of Treatment: Please follow up with your primary care doctor and a nephrologist

## 2021-04-05 NOTE — DISCHARGE NOTE PROVIDER - NSDCMRMEDTOKEN_GEN_ALL_CORE_FT
atorvastatin 80 mg oral tablet: 1 tab(s) orally once a day  Ecotrin: 81 milligram(s) orally once a day  fenofibrate 48 mg oral tablet: 1 tab(s) orally once a day  insulin glargine: 18 unit(s) subcutaneous once a day in the am  insulin lispro 100 units/mL injectable solution: 7 unit(s) injectable 3 times a day (with meals)  melatonin 3 mg oral tablet: 1 tab(s) orally once a day (at bedtime)  NIFEdipine 90 mg oral tablet, extended release: 1 tab(s) orally once a day   alcohol swabs : Apply topically to affected area 4 times a day   Aspirin Low Dose 81 mg oral tablet, chewable: 1 tab(s) orally once a day  atorvastatin 80 mg oral tablet: 1 tab(s) orally once a day  Basaglar KwikPen 100 units/mL subcutaneous solution: 10 unit(s) subcutaneous 2 times a day- morning and at bedtime  carvedilol 12.5 mg oral tablet: 1 tab(s) orally every 12 hours  Ecotrin: 81 milligram(s) orally once a day  glucometer (per patient&#x27;s insurance): Test blood sugars four times a day. Dispense #1 glucometer.  glucose tablets: Follow instructions on bottle when sugar is low.  HumaLOG KwikPen 200 units/mL (Concentrated) subcutaneous solution: 5 unit(s) subcutaneous 3 times a day (with meals)   insulin glargine: 18 unit(s) subcutaneous once a day in the am  insulin lispro 100 units/mL injectable solution: 7 unit(s) injectable 3 times a day (with meals)  Insulin Pen Needles, 4mm: 1 application subcutaneously 4 times a day. ** Use with insulin pen **   lancets: 1 application subcutaneously 4 times a day   Lipitor 80 mg oral tablet: 1 tab(s) orally once a day (at bedtime)  NIFEdipine (Eqv-Adalat CC) 90 mg oral tablet, extended release: 1 tab(s) orally once a day  NIFEdipine 90 mg oral tablet, extended release: 1 tab(s) orally once a day  senna oral tablet: 2 tab(s) orally once a day (at bedtime), As needed, Constipation   alcohol swabs : Apply topically to affected area 4 times a day   Aspirin Low Dose 81 mg oral tablet, chewable: 1 tab(s) orally once a day  Basaglar KwikPen 100 units/mL subcutaneous solution: 10 unit(s) subcutaneous 2 times a day- morning and at bedtime  carvedilol 12.5 mg oral tablet: 1 tab(s) orally every 12 hours  glucometer (per patient&#x27;s insurance): Test blood sugars four times a day. Dispense #1 glucometer.  glucose tablets: Follow instructions on bottle when sugar is low.  HumaLOG KwikPen 200 units/mL (Concentrated) subcutaneous solution: 5 unit(s) subcutaneous 3 times a day (with meals)   Insulin Pen Needles, 4mm: 1 application subcutaneously 4 times a day. ** Use with insulin pen **   lancets: 1 application subcutaneously 4 times a day   Lipitor 80 mg oral tablet: 1 tab(s) orally once a day (at bedtime)  NIFEdipine (Eqv-Adalat CC) 90 mg oral tablet, extended release: 1 tab(s) orally once a day  senna oral tablet: 2 tab(s) orally once a day (at bedtime), As needed, Constipation

## 2021-04-05 NOTE — PROVIDER CONTACT NOTE (MEDICATION) - SITUATION
hold premeal insulin. BG 79
Pt"s F.S 86 predinner Pt due to receive 7 units of premeal insulin Asymptomatic
blood sugar 57,repeat blood sugar 55

## 2021-04-05 NOTE — DISCHARGE NOTE PROVIDER - PROVIDER TOKENS
PROVIDER:[TOKEN:[[id number]]],PROVIDER:[TOKEN:[[id number]]],PROVIDER:[TOKEN:[[id number]]],PROVIDER:[TOKEN:[[id number]]] PROVIDER:[TOKEN:[[id number]]],PROVIDER:[TOKEN:[[id number]]],PROVIDER:[TOKEN:[[id number]]],PROVIDER:[TOKEN:[[id number]]],PROVIDER:[TOKEN:[[id number]]]

## 2021-04-05 NOTE — DISCHARGE NOTE PROVIDER - HOSPITAL COURSE
69 y/o woman with pmhx of CAD s/p stent 6 years ago, Dm2, CKD, HTN who presents to the ER with 1-2 days of generalized weakness.  Reportedly per her son she had fallen and was slightly confused but with no LOC or head strike. She was found to have slurred speech at some point as well. She denies any headache or vision changes. Last normal per son around 10 PM friday. in the ER pt was given asa, ceftriaxone and 2L NS.   CT head revealed acute cva involving R internal capsule, R corona radiata.     Medically stable for transfer to [facility] on 3/19/2021. Patient seen and examined at bedside, no complaints.    Patient participated in daily therapy and made good functional gains.     69 y/o woman with pmhx of CAD s/p stent 6 years ago, Dm2, ?CKD, HTN, found to have R internal capsule, R corona radiata. Now with left hemiparesis, dysphasia.    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 GN serologies previously NEG. Eosinophilia raises concern for ATIN. 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  FU with Endocrinology upon discharge    #Pain control  - Tylenol PRN    #Insomnia  - increase melatonin from 3mg to 6mg and then 9 mg. Didn't help sleep. Plan to changed melatonin to 3 mg and start trazodone 25 mg (3/26)    #GI/Bowel Mgmt   - Continent c/w Senna    #Bladder management  - Continent,     #DVT  - Heparin  - SCDs, TEDs     #Skin:  -No active issues at this time    FEN   - Diet - Dysphagia 2 Mechanical Soft-Thin Liquids; Consistent Carb/DASH    - Dysphagia  SLP - evaluation and treatment    Precautions / PROPHYLAXIS:   - Falls, Cardiac  - ortho: Weight bearing status: WBAT   - Lungs: Aspiration, Incentive Spirometer   - Pressure injury/Skin: Turn Q2hrs while in bed, OOB to Chair, PT/OT/SLP    Dispo: IDT Round 3/23/21  - Lives in private home with , 4 TRESSA and 10 steps inside  - Supervision for eating, grooming, Min A for lower body dressing and toilet transfers  - Goal for Mod I for self care and supervision for shower transfers  - SLP - reduced awareness deficits, mild deficits comprehension  - Tentative DC date 4/6/21 Home with Home Care    Followups:  Libman, Richard B (MD)  Neurology; Vascular Neurology  [address], [address]  [place] [address]  Phone: [phone]  Fax: [phone]  Follow Up Time:     Lillian Koch)  EndocrinologyMetabDiabetes  [address] [place] [address]  Phone: [phone]  Fax: [phone]

## 2021-04-05 NOTE — PROGRESS NOTE ADULT - ASSESSMENT
70F with PMH CAD s/p stent (6 years ago), Type 2 DM, CKD Stage 4, HTN admitted to [facility] after acute CVA    #Acute CVA  -Continue comprehensive rehab program  -Continue ASA 81mg daily and lipitor 80mg q hs  - fall precautions    #DM Type II  -HA1C 13.9 (3/15)  - on Lantus 10 units qAM, Lantus 10 units qHS, 7 units pre-meal breakfast and dinner, 5 units pre-meal at lunch,   - BS low in evening. Changed admelog to 5 unit TIDAC. 4/3  - Target BS in hospital setting 100-180 to avoid hypoglycemia  - low dose insulin sliding scale  -Given uncontrolled A1C, indication for subq insulin on discharge  -Hypoglycemia protocol, accu-cheks    #BROCK/ATN on CKD IV  -Fluctuating Cr with baseline Cr of 2-2.1  -Avoid nephrotoxic agents  -monitor BMP  -Nephrology recommendations appreciated. Renal f/u OP    #CAD s/p stent  #HTN: uncontrolled  -Continue ASA, statin, fenofibrate  -Continue Nifedipine 90mg daily  - increased Carvedilol 6.25mg BID to 12.5 BID on 4/1  -Monitor vitals  - DASH/TLC  - monitor BP and adjust dose    #DVT ppx - heparin

## 2021-04-05 NOTE — DISCHARGE NOTE PROVIDER - NPI NUMBER (FOR SYSADMIN USE ONLY) :
[[id number]],[[id number]] [[id number]],[[id number]],[[id number]],[[id number]]

## 2021-04-05 NOTE — DISCHARGE NOTE PROVIDER - CARE PROVIDERS DIRECT ADDRESSES
,[email or web address]

## 2021-04-06 ENCOUNTER — TRANSCRIPTION ENCOUNTER (OUTPATIENT)
Age: 71
End: 2021-04-06

## 2021-04-06 VITALS
OXYGEN SATURATION: 100 % | TEMPERATURE: 97 F | RESPIRATION RATE: 15 BRPM | HEART RATE: 83 BPM | SYSTOLIC BLOOD PRESSURE: 161 MMHG | DIASTOLIC BLOOD PRESSURE: 79 MMHG

## 2021-04-06 LAB
ANION GAP SERPL CALC-SCNC: 10 MMOL/L — SIGNIFICANT CHANGE UP (ref 5–17)
BUN SERPL-MCNC: 64 MG/DL — HIGH (ref 7–23)
CALCIUM SERPL-MCNC: 8.4 MG/DL — SIGNIFICANT CHANGE UP (ref 8.4–10.5)
CHLORIDE SERPL-SCNC: 106 MMOL/L — SIGNIFICANT CHANGE UP (ref 96–108)
CO2 SERPL-SCNC: 23 MMOL/L — SIGNIFICANT CHANGE UP (ref 22–31)
CREAT SERPL-MCNC: 2.65 MG/DL — HIGH (ref 0.5–1.3)
GLUCOSE BLDC GLUCOMTR-MCNC: 246 MG/DL — HIGH (ref 70–99)
GLUCOSE BLDC GLUCOMTR-MCNC: 248 MG/DL — HIGH (ref 70–99)
GLUCOSE SERPL-MCNC: 223 MG/DL — HIGH (ref 70–99)
POTASSIUM SERPL-MCNC: 4.2 MMOL/L — SIGNIFICANT CHANGE UP (ref 3.5–5.3)
POTASSIUM SERPL-SCNC: 4.2 MMOL/L — SIGNIFICANT CHANGE UP (ref 3.5–5.3)
SODIUM SERPL-SCNC: 139 MMOL/L — SIGNIFICANT CHANGE UP (ref 135–145)

## 2021-04-06 PROCEDURE — 99232 SBSQ HOSP IP/OBS MODERATE 35: CPT

## 2021-04-06 PROCEDURE — 99238 HOSP IP/OBS DSCHRG MGMT 30/<: CPT

## 2021-04-06 RX ORDER — ATORVASTATIN CALCIUM 80 MG/1
1 TABLET, FILM COATED ORAL
Qty: 0 | Refills: 0 | DISCHARGE

## 2021-04-06 RX ORDER — ASPIRIN/CALCIUM CARB/MAGNESIUM 324 MG
81 TABLET ORAL
Qty: 0 | Refills: 0 | DISCHARGE

## 2021-04-06 RX ADMIN — Medication 5 UNIT(S): at 12:10

## 2021-04-06 RX ADMIN — Medication 2: at 12:11

## 2021-04-06 RX ADMIN — Medication 2: at 08:06

## 2021-04-06 RX ADMIN — Medication 81 MILLIGRAM(S): at 12:10

## 2021-04-06 RX ADMIN — CARVEDILOL PHOSPHATE 12.5 MILLIGRAM(S): 80 CAPSULE, EXTENDED RELEASE ORAL at 05:43

## 2021-04-06 RX ADMIN — INSULIN GLARGINE 10 UNIT(S): 100 INJECTION, SOLUTION SUBCUTANEOUS at 08:31

## 2021-04-06 RX ADMIN — HEPARIN SODIUM 5000 UNIT(S): 5000 INJECTION INTRAVENOUS; SUBCUTANEOUS at 05:43

## 2021-04-06 RX ADMIN — Medication 5 UNIT(S): at 08:03

## 2021-04-06 RX ADMIN — Medication 90 MILLIGRAM(S): at 05:43

## 2021-04-06 NOTE — PROGRESS NOTE ADULT - PROVIDER SPECIALTY LIST ADULT
Hospitalist
Internal Medicine
Nephrology
Rehab Medicine
Nephrology
Rehab Medicine
Hospitalist
Internal Medicine
Nephrology
Nephrology
Hospitalist

## 2021-04-06 NOTE — DISCHARGE NOTE NURSING/CASE MANAGEMENT/SOCIAL WORK - NSDCFUADDAPPT_GEN_ALL_CORE_FT
Dr. Bridget Hemphill - Nephrology    Patient has a PCP but wishes to switch. She was provided with the names of 2 PMD's at [facility] who see outpatients-     Patient is being referred to outpatient PT- SW assisting with referral (in process) Dr. Bridget Hemphill - Nephrology    Patient has a PCP but wishes to switch. She was provided with the names of 2 PMD's at [facility] who see outpatients-     Patient is being referred to outpatient PT. Family has selected [facility] in [place]. Social Work will facillitate referral to Gloria Valenzuela [facility].

## 2021-04-06 NOTE — PROGRESS NOTE ADULT - NSICDXPILOT_GEN_ALL_CORE
[place]

## 2021-04-06 NOTE — PROGRESS NOTE ADULT - REASON FOR ADMISSION
01.1 Left Body Involvement (Right Brain)

## 2021-04-06 NOTE — PROGRESS NOTE ADULT - SUBJECTIVE AND OBJECTIVE BOX
Cc: Gait dysfunction    HPI: Patient with no new medical issues today.  Patient reports that therapy is going well.  Pain controlled, no chest pain, no N/V, no Fevers/Chills. No other new ROS  Has been tolerating rehabilitation program.    acetaminophen   Tablet .. 650 milliGRAM(s) Oral every 6 hours PRN  aspirin  chewable 81 milliGRAM(s) Oral daily  atorvastatin 80 milliGRAM(s) Oral at bedtime  carvedilol 6.25 milliGRAM(s) Oral every 12 hours  dextrose 40% Gel 15 Gram(s) Oral once  dextrose 5%. 1000 milliLiter(s) IV Continuous <Continuous>  dextrose 5%. 1000 milliLiter(s) IV Continuous <Continuous>  dextrose 50% Injectable 12.5 Gram(s) IV Push once  dextrose 50% Injectable 25 Gram(s) IV Push once  dextrose 50% Injectable 25 Gram(s) IV Push once  fenofibrate Tablet 48 milliGRAM(s) Oral daily  glucagon  Injectable 1 milliGRAM(s) IntraMuscular once  heparin   Injectable 5000 Unit(s) SubCutaneous every 12 hours  insulin glargine Injectable (LANTUS) 18 Unit(s) SubCutaneous every morning  insulin glargine Injectable (LANTUS) 3 Unit(s) SubCutaneous at bedtime  insulin lispro (ADMELOG) corrective regimen sliding scale   SubCutaneous three times a day before meals  insulin lispro (ADMELOG) corrective regimen sliding scale   SubCutaneous at bedtime  insulin lispro Injectable (ADMELOG) 7 Unit(s) SubCutaneous before breakfast  insulin lispro Injectable (ADMELOG) 5 Unit(s) SubCutaneous before lunch  insulin lispro Injectable (ADMELOG) 7 Unit(s) SubCutaneous before dinner  melatonin 3 milliGRAM(s) Oral at bedtime  NIFEdipine XL 90 milliGRAM(s) Oral daily  pantoprazole    Tablet 40 milliGRAM(s) Oral before breakfast  senna 2 Tablet(s) Oral at bedtime PRN  sodium chloride 0.45%. 1000 milliLiter(s) IV Continuous <Continuous>  traZODone 25 milliGRAM(s) Oral at bedtime      T(C): 37 (03-27-21 @ 07:30), Max: 37.2 (03-26-21 @ 20:35)  HR: 87 (03-27-21 @ 07:30) (87 - 96)  BP: 163/84 (03-27-21 @ 07:30) (155/89 - 163/84)  RR: 16 (03-27-21 @ 07:30) (14 - 16)  SpO2: 100% (03-27-21 @ 07:30) (96% - 100%)    In NAD  HEENT- EOMI  Heart- well perfused  Lungs- breathing well on RA  Abd- + BS, NT  Ext- No calf pain  Neuro- Exam unchanged          Imp: Patient with diagnosis of Rt CVA w/ left sided weakness admitted for comprehensive acute rehabilitation.    Plan:  - Continue therapies  - Nephrology recs appreciated.  IVF trial ordered.  BMP showing Cr downtrending.  UA and urine lytes pending.  - DVT prophylaxis  - Skin- Turn q2h, check skin daily  - Continue current medications; patient medically stable.   - Patient is stable to continue current rehabilitation program.   
Patient is a 70y old  Female who presents with a chief complaint of 01.1 Left Body Involvement (Right Brain) (06 Apr 2021 12:08)    No events overnight  Denies chest pain, SOB  Tolerating diet    Patient seen and examined at bedside.    ALLERGIES:  No Known Allergies    MEDICATIONS  (STANDING):  aspirin  chewable 81 milliGRAM(s) Oral daily  atorvastatin 80 milliGRAM(s) Oral at bedtime  carvedilol 12.5 milliGRAM(s) Oral every 12 hours  dextrose 40% Gel 15 Gram(s) Oral once  dextrose 5%. 1000 milliLiter(s) (50 mL/Hr) IV Continuous <Continuous>  dextrose 5%. 1000 milliLiter(s) (100 mL/Hr) IV Continuous <Continuous>  dextrose 50% Injectable 12.5 Gram(s) IV Push once  dextrose 50% Injectable 25 Gram(s) IV Push once  dextrose 50% Injectable 25 Gram(s) IV Push once  glucagon  Injectable 1 milliGRAM(s) IntraMuscular once  heparin   Injectable 5000 Unit(s) SubCutaneous every 12 hours  insulin glargine Injectable (LANTUS) 10 Unit(s) SubCutaneous every morning  insulin glargine Injectable (LANTUS) 10 Unit(s) SubCutaneous at bedtime  insulin lispro (ADMELOG) corrective regimen sliding scale   SubCutaneous three times a day before meals  insulin lispro (ADMELOG) corrective regimen sliding scale   SubCutaneous at bedtime  insulin lispro Injectable (ADMELOG) 5 Unit(s) SubCutaneous three times a day before meals  NIFEdipine XL 90 milliGRAM(s) Oral daily  traZODone 25 milliGRAM(s) Oral <User Schedule>    MEDICATIONS  (PRN):  acetaminophen   Tablet .. 650 milliGRAM(s) Oral every 6 hours PRN Temp greater or equal to 38C (100.4F), Mild Pain (1 - 3)  senna 2 Tablet(s) Oral at bedtime PRN Constipation    Vital Signs Last 24 Hrs  T(F): 97.4 (06 Apr 2021 07:39), Max: 98.3 (05 Apr 2021 20:39)  HR: 83 (06 Apr 2021 07:39) (83 - 96)  BP: 161/79 (06 Apr 2021 07:39) (136/69 - 170/77)  RR: 15 (06 Apr 2021 07:39) (14 - 15)  SpO2: 100% (06 Apr 2021 07:39) (97% - 100%)  I&O's Summary      PHYSICAL EXAM:  General: NAD, A/O x 3  ENT: MMM, no scleral icterus  Neck: Supple, No JVD, no thyroidomegaly  Lungs: Clear to auscultation bilaterally, no wheezes, no rales, no rhonchi, good inspiratory effort  Cardio: RRR, S1/S2, No murmurs  Abdomen: Soft, Nontender, Nondistended; Bowel sounds present  Extremities: No calf tenderness, No pitting edema, no skin changes    LABS:                        9.1    10.41 )-----------( 467      ( 05 Apr 2021 05:30 )             27.6       04-06    139  |  106  |  64  ----------------------------<  223  4.2   |  23  |  2.65    Ca    8.4      06 Apr 2021 06:15    TPro  6.1  /  Alb  2.3  /  TBili  <0.1  /  DBili  x   /  AST  22  /  ALT  28  /  AlkPhos  58  04-05     eGFR if Non African American: 18 mL/min/1.73M2 (04-06-21 @ 06:15)  eGFR if African American: 21 mL/min/1.73M2 (04-06-21 @ 06:15)     03-14 Chol 263 mg/dL LDL -- HDL 34 mg/dL Trig 396 mg/dL      POCT Blood Glucose.: 248 mg/dL (06 Apr 2021 12:07)  POCT Blood Glucose.: 246 mg/dL (06 Apr 2021 07:41)  POCT Blood Glucose.: 192 mg/dL (05 Apr 2021 20:57)  POCT Blood Glucose.: 79 mg/dL (05 Apr 2021 17:10)      COVID-19 PCR: NotDetec (03-30-21 @ 22:00)  COVID-19 PCR: NotDetec (03-19-21 @ 11:43)  COVID-19 PCR: NotDetec (03-14-21 @ 00:37)        RADIOLOGY & ADDITIONAL TESTS:No new imaging noted    
Patient is a 70y old  Female who presents with a chief complaint of 01.1 Left Body Involvement (Right Brain) (23 Mar 2021 09:02)    71 y/o woman with pmhx of CAD s/p stent 6 years ago, Dm2, ?CKD, HTN who presents to the ER with 1-2 days of generalized weakness.  Reportedly per her son she had fallen and was slightly confused but with no LOC or head strike. She was found to have slurred speech at some point as well. She denies any headache or vision changes. Last normal per son around 10 PM friday. in the ER pt was given asa, ceftriaxone and 2L NS.   CT head revealed acute cva involving R internal capsule, R corona radiata.   Medically stable for transfer to [facility] on 3/19/2021. Patient seen and examined at bedside, no complaints.  (19 Mar 2021 17:06)      PAST MEDICAL & SURGICAL HISTORY:  Hypertension  DM (diabetes mellitus)  CAD (coronary artery disease)  No significant past surgical history    Allergies  No Known Allergies  Intolerances    TODAY'S SUBJECTIVE & REVIEW OF SYMPTOMS:  Patient seen and evaluated this morning. No acute events overnight or over the weekend. Participating well in therapy. Pain is well controlled. Denies chest pain, SOB, nausea, vomiting, constipation or diarrhea. Slept very well last night.     PHYSICAL EXAM Vital Signs Last 24 Hrs  T(C): 36.3 (06 Apr 2021 07:39), Max: 36.8 (05 Apr 2021 20:39)  T(F): 97.4 (06 Apr 2021 07:39), Max: 98.3 (05 Apr 2021 20:39)  HR: 83 (06 Apr 2021 07:39) (83 - 96)  BP: 161/79 (06 Apr 2021 07:39) (136/69 - 170/77)  BP(mean): --  RR: 15 (06 Apr 2021 07:39) (14 - 15)  SpO2: 100% (06 Apr 2021 07:39) (97% - 100%)  ----------------------------------------------------------------------------------------  PHYSICAL EXAM  Constitutional - NAD, Comfortable  HEENT - NCAT, EOMI  Neck - Supple, No limited ROM  Chest - Breathing comfortably, No wheezing  Cardiovascular - S1S2   Abdomen - Soft   Extremities - No C/C/E, No calf tenderness   Neurologic Exam -                    Cognitive - Awake, Alert, AAO to self, place, date, year, situation     Communication - Fluent, No dysarthria     Cranial Nerves - slight left facial droop     Motor - No focal deficits                    LEFT    UE - ShAB 4/5, EF 4/5, EE 4/5, WE 4/5,  4/5                    RIGHT UE - ShAB 5/5, EF 5/5, EE 5/5, WE 5/5,  5/5                    LEFT    LE - HF 4/5, KE 4/5, DF 4/5, PF 4/5                    RIGHT LE - HF 5/5, KE 5/5, DF 5/5, PF 5/5        Sensory - Intact to LT     Reflexes - DTR Intact     Coordination - FTN intact on right; slight difficulty on left     OculoVestibular - No saccades, No nystagmus, VOR      Psychiatric - Mood stable, Affect WNL                        RECENT LABS                        9.1    10.41 )-----------( 467      ( 05 Apr 2021 05:30 )             27.6     04-05    141  |  108  |  66<H>  ----------------------------<  123<H>  4.4   |  22  |  2.62<H>    Ca    8.9      05 Apr 2021 05:30    TPro  6.1  /  Alb  2.3<L>  /  TBili  <0.1<L>  /  DBili  x   /  AST  22  /  ALT  28  /  AlkPhos  58  04-05      MEDICATIONS  (STANDING):  aspirin  chewable 81 milliGRAM(s) Oral daily  atorvastatin 80 milliGRAM(s) Oral at bedtime  carvedilol 12.5 milliGRAM(s) Oral every 12 hours  dextrose 40% Gel 15 Gram(s) Oral once  dextrose 5%. 1000 milliLiter(s) (50 mL/Hr) IV Continuous <Continuous>  dextrose 5%. 1000 milliLiter(s) (100 mL/Hr) IV Continuous <Continuous>  dextrose 50% Injectable 25 Gram(s) IV Push once  dextrose 50% Injectable 12.5 Gram(s) IV Push once  dextrose 50% Injectable 25 Gram(s) IV Push once  glucagon  Injectable 1 milliGRAM(s) IntraMuscular once  heparin   Injectable 5000 Unit(s) SubCutaneous every 12 hours  insulin glargine Injectable (LANTUS) 10 Unit(s) SubCutaneous every morning  insulin glargine Injectable (LANTUS) 10 Unit(s) SubCutaneous at bedtime  insulin lispro (ADMELOG) corrective regimen sliding scale   SubCutaneous three times a day before meals  insulin lispro (ADMELOG) corrective regimen sliding scale   SubCutaneous at bedtime  insulin lispro Injectable (ADMELOG) 5 Unit(s) SubCutaneous three times a day before meals  NIFEdipine XL 90 milliGRAM(s) Oral daily  traZODone 25 milliGRAM(s) Oral <User Schedule>    MEDICATIONS  (PRN):  acetaminophen   Tablet .. 650 milliGRAM(s) Oral every 6 hours PRN Temp greater or equal to 38C (100.4F), Mild Pain (1 - 3)  senna 2 Tablet(s) Oral at bedtime PRN Constipation      IMPRESSION AND PLAN:  71 y/o woman with pmhx of CAD s/p stent 6 years ago, Dm2, ?CKD, HTN, found to have R internal capsule, R corona radiata. Now with left hemiparesis, dysphasia.    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 GN serologies previously NEG. Eosinophilia raises concern for ATIN. 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  FU with Endocrinology upon discharge    #Pain control  - Tylenol PRN    #Insomnia  - increase melatonin from 3mg to 6mg and then 9 mg. Didn't help sleep. Plan to changed melatonin to 3 mg and start trazodone 25 mg (3/26)    #GI/Bowel Mgmt   - Continent c/w Senna    #Bladder management  - Continent,     #DVT  - Heparin  - SCDs, TEDs     #Skin:  -No active issues at this time    FEN   - Diet - Dysphagia 2 Mechanical Soft-Thin Liquids; Consistent Carb/DASH    - Dysphagia  SLP - evaluation and treatment    Precautions / PROPHYLAXIS:   - Falls, Cardiac  - ortho: Weight bearing status: WBAT   - Lungs: Aspiration, Incentive Spirometer   - Pressure injury/Skin: Turn Q2hrs while in bed, OOB to Chair, PT/OT/SLP    Dispo: IDT Round 3/23/21  - Lives in private home with , 4 TRESSA and 10 steps inside  - Supervision for eating, grooming, Min A for lower body dressing and toilet transfers  - Goal for Mod I for self care and supervision for shower transfers  - SLP - reduced awareness deficits, mild deficits comprehension  - Tentative DC date 4/6/21 Home with Home Care    Followups:  Libman, Richard B (MD)  Neurology; Vascular Neurology  [address], [address]  [place] [address]  Phone: [phone]  Fax: [phone]  Follow Up Time:     Lillian Koch)  Endocrinology[facility]ab[facility]abetes  [address] [place] [address]  Phone: [phone]  Fax: [phone]      
Patient is a 70y old  Female who presents with a chief complaint of 01.1 Left Body Involvement (Right Brain) (31 Mar 2021 13:38)      Patient seen and examined at bedside. No overnight events. Pain well controlled. Participating in therapy. Having breakfast.    ALLERGIES:  No Known Allergies    MEDICATIONS  (STANDING):  aspirin  chewable 81 milliGRAM(s) Oral daily  atorvastatin 80 milliGRAM(s) Oral at bedtime  carvedilol 6.25 milliGRAM(s) Oral every 12 hours  dextrose 40% Gel 15 Gram(s) Oral once  dextrose 5%. 1000 milliLiter(s) (50 mL/Hr) IV Continuous <Continuous>  dextrose 5%. 1000 milliLiter(s) (100 mL/Hr) IV Continuous <Continuous>  dextrose 50% Injectable 25 Gram(s) IV Push once  dextrose 50% Injectable 12.5 Gram(s) IV Push once  dextrose 50% Injectable 25 Gram(s) IV Push once  glucagon  Injectable 1 milliGRAM(s) IntraMuscular once  heparin   Injectable 5000 Unit(s) SubCutaneous every 12 hours  insulin glargine Injectable (LANTUS) 10 Unit(s) SubCutaneous at bedtime  insulin glargine Injectable (LANTUS) 10 Unit(s) SubCutaneous every morning  insulin lispro (ADMELOG) corrective regimen sliding scale   SubCutaneous three times a day before meals  insulin lispro (ADMELOG) corrective regimen sliding scale   SubCutaneous at bedtime  insulin lispro Injectable (ADMELOG) 7 Unit(s) SubCutaneous before dinner  insulin lispro Injectable (ADMELOG) 7 Unit(s) SubCutaneous before breakfast  insulin lispro Injectable (ADMELOG) 5 Unit(s) SubCutaneous before lunch  NIFEdipine XL 90 milliGRAM(s) Oral daily  sodium chloride 0.9%. 1000 milliLiter(s) (75 mL/Hr) IV Continuous <Continuous>  traZODone 25 milliGRAM(s) Oral <User Schedule>    MEDICATIONS  (PRN):  acetaminophen   Tablet .. 650 milliGRAM(s) Oral every 6 hours PRN Temp greater or equal to 38C (100.4F), Mild Pain (1 - 3)  senna 2 Tablet(s) Oral at bedtime PRN Constipation    Vital Signs Last 24 Hrs  T(F): 97.8 (31 Mar 2021 07:47), Max: 97.8 (31 Mar 2021 07:47)  HR: 85 (31 Mar 2021 07:47) (78 - 88)  BP: 156/70 (31 Mar 2021 07:47) (155/71 - 178/96)  RR: 15 (31 Mar 2021 07:47) (14 - 15)  SpO2: 99% (31 Mar 2021 07:47) (98% - 99%)  I&O's Summary    30 Mar 2021 07:01  -  31 Mar 2021 07:00  --------------------------------------------------------  IN: 826 mL / OUT: 1 mL / NET: 825 mL          PHYSICAL EXAM:  General: NAD, A/O x 3  ENT: MMM, no scleral icterus  Neck: Supple, No JVD  Lungs: Clear to auscultation bilaterally, no wheezes, rales, rhonchi  Cardio: RRR, S1/S2, No murmurs  Abdomen: Soft, Nontender, Nondistended; Bowel sounds present  Extremities: No calf tenderness, No pitting edema    LABS:                        9.3    10.58 )-----------( 399      ( 29 Mar 2021 05:00 )             28.2       03-31    142  |  108  |  66  ----------------------------<  115  4.1   |  22  |  2.98    Ca    8.6      31 Mar 2021 05:00    TPro  5.9  /  Alb  2.1  /  TBili  0.1  /  DBili  x   /  AST  16  /  ALT  25  /  AlkPhos  56  03-29     eGFR if Non African American: 15 mL/min/1.73M2 (03-31-21 @ 05:00)  eGFR if African American: 18 mL/min/1.73M2 (03-31-21 @ 05:00)         CARDIAC MARKERS ( 31 Mar 2021 05:00 )  x     / x     / 73 U/L / x     / x          03-14 Chol 263 mg/dL LDL -- HDL 34 mg/dL Trig 396 mg/dL              POCT Blood Glucose.: 146 mg/dL (31 Mar 2021 11:52)  POCT Blood Glucose.: 136 mg/dL (31 Mar 2021 07:44)  POCT Blood Glucose.: 115 mg/dL (30 Mar 2021 21:07)  POCT Blood Glucose.: 109 mg/dL (30 Mar 2021 17:10)            RADIOLOGY & ADDITIONAL TESTS:    Care Discussed with Consultants/Other Providers: Dr. Velarde (physiatry)  
Subjective: no complaints.       MEDICATIONS  (STANDING):  aspirin  chewable 81 milliGRAM(s) Oral daily  atorvastatin 80 milliGRAM(s) Oral at bedtime  carvedilol 12.5 milliGRAM(s) Oral every 12 hours  dextrose 40% Gel 15 Gram(s) Oral once  dextrose 5%. 1000 milliLiter(s) (50 mL/Hr) IV Continuous <Continuous>  dextrose 5%. 1000 milliLiter(s) (100 mL/Hr) IV Continuous <Continuous>  dextrose 50% Injectable 25 Gram(s) IV Push once  dextrose 50% Injectable 12.5 Gram(s) IV Push once  dextrose 50% Injectable 25 Gram(s) IV Push once  glucagon  Injectable 1 milliGRAM(s) IntraMuscular once  heparin   Injectable 5000 Unit(s) SubCutaneous every 12 hours  insulin glargine Injectable (LANTUS) 10 Unit(s) SubCutaneous every morning  insulin glargine Injectable (LANTUS) 10 Unit(s) SubCutaneous at bedtime  insulin lispro (ADMELOG) corrective regimen sliding scale   SubCutaneous three times a day before meals  insulin lispro (ADMELOG) corrective regimen sliding scale   SubCutaneous at bedtime  insulin lispro Injectable (ADMELOG) 7 Unit(s) SubCutaneous before breakfast  insulin lispro Injectable (ADMELOG) 5 Unit(s) SubCutaneous before lunch  insulin lispro Injectable (ADMELOG) 7 Unit(s) SubCutaneous before dinner  NIFEdipine XL 90 milliGRAM(s) Oral daily  traZODone 25 milliGRAM(s) Oral <User Schedule>    MEDICATIONS  (PRN):  acetaminophen   Tablet .. 650 milliGRAM(s) Oral every 6 hours PRN Temp greater or equal to 38C (100.4F), Mild Pain (1 - 3)  senna 2 Tablet(s) Oral at bedtime PRN Constipation          T(C): 36.5 (04-01-21 @ 10:13), Max: 36.5 (04-01-21 @ 10:13)  HR: 85 (04-01-21 @ 10:13) (85 - 88)  BP: 130/68 (04-01-21 @ 10:13) (130/68 - 176/81)  RR: 16 (04-01-21 @ 10:13) (16 - 16)  SpO2: 99% (04-01-21 @ 10:13) (99% - 99%)  Wt(kg): --        I&O's Detail           PHYSICAL EXAM:    GENERAL: NAD  NECK: Supple, no inc in JVP  CHEST/LUNG: Clear  HEART: S1S2  ABDOMEN: Soft, Nontender, Nondistended; Bowel sounds present  EXTREMITIES:  no edema  NEURO: no asterixis      LABS:  CBC Full  -  ( 01 Apr 2021 05:00 )  WBC Count : 10.10 K/uL  RBC Count : 3.69 M/uL  Hemoglobin : 10.2 g/dL  Hematocrit : 31.3 %  Platelet Count - Automated : 458 K/uL  Mean Cell Volume : 84.8 fl  Mean Cell Hemoglobin : 27.6 pg  Mean Cell Hemoglobin Concentration : 32.6 gm/dL  Auto Neutrophil # : 5.52 K/uL  Auto Lymphocyte # : 2.76 K/uL  Auto Monocyte # : 0.75 K/uL  Auto Eosinophil # : 0.94 K/uL  Auto Basophil # : 0.03 K/uL  Auto Neutrophil % : 54.7 %  Auto Lymphocyte % : 27.3 %  Auto Monocyte % : 7.4 %  Auto Eosinophil % : 9.3 %  Auto Basophil % : 0.3 %    04-01    140  |  105  |  73<H>  ----------------------------<  161<H>  4.0   |  21<L>  |  2.90<H>    Ca    8.7      01 Apr 2021 05:00    TPro  6.4  /  Alb  2.4<L>  /  TBili  0.2  /  DBili  x   /  AST  28  /  ALT  35  /  AlkPhos  59  04-01          ASSESSMENT:   Inpatient BROCK x 1 week old, etiology not obvious. GN serologies previously NEG. Eosinophilia raises concern for ATIN. 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   Daily BMP  May dc to outpt renal f/u              
Denies CP, SOB, N/V    Vital Signs Last 24 Hrs  T(C): 36.8 (03-25-21 @ 09:18), Max: 36.8 (03-25-21 @ 05:28)  T(F): 98.3 (03-25-21 @ 09:18), Max: 98.3 (03-25-21 @ 09:18)  HR: 87 (03-25-21 @ 17:00) (64 - 96)  BP: 169/82 (03-25-21 @ 17:00) (137/67 - 169/82)  RR: 18 (03-25-21 @ 09:18) (18 - 18)  SpO2: 99% (03-25-21 @ 09:18) (99% - 100%)    s1s2  clear b/l  soft, ND  no edema                        10.9   11.10 )-----------( 410      ( 25 Mar 2021 07:37 )             34.0     25 Mar 2021 07:37    140    |  107    |  59     ----------------------------<  193    4.7     |  27     |  2.96     Ca    8.7        25 Mar 2021 07:37    TPro  5.5    /  Alb  2.4    /  TBili  0.2    /  DBili  x      /  AST  24     /  ALT  33     /  AlkPhos  63     25 Mar 2021 07:37    LIVER FUNCTIONS - ( 25 Mar 2021 07:37 )  Alb: 2.4 g/dL / Pro: 5.5 g/dL / ALK PHOS: 63 U/L / ALT: 33 U/L / AST: 24 U/L / GGT: x           acetaminophen   Tablet .. 650 milliGRAM(s) Oral every 6 hours PRN  aspirin  chewable 81 milliGRAM(s) Oral daily  atorvastatin 80 milliGRAM(s) Oral at bedtime  carvedilol 6.25 milliGRAM(s) Oral every 12 hours  dextrose 40% Gel 15 Gram(s) Oral once  dextrose 5%. 1000 milliLiter(s) IV Continuous <Continuous>  dextrose 5%. 1000 milliLiter(s) IV Continuous <Continuous>  dextrose 50% Injectable 25 Gram(s) IV Push once  dextrose 50% Injectable 12.5 Gram(s) IV Push once  dextrose 50% Injectable 25 Gram(s) IV Push once  fenofibrate Tablet 48 milliGRAM(s) Oral daily  glucagon  Injectable 1 milliGRAM(s) IntraMuscular once  heparin   Injectable 5000 Unit(s) SubCutaneous every 12 hours  insulin glargine Injectable (LANTUS) 18 Unit(s) SubCutaneous every morning  insulin lispro (ADMELOG) corrective regimen sliding scale   SubCutaneous three times a day before meals  insulin lispro (ADMELOG) corrective regimen sliding scale   SubCutaneous at bedtime  insulin lispro Injectable (ADMELOG) 7 Unit(s) SubCutaneous before breakfast  insulin lispro Injectable (ADMELOG) 5 Unit(s) SubCutaneous before lunch  insulin lispro Injectable (ADMELOG) 7 Unit(s) SubCutaneous before dinner  melatonin 9 milliGRAM(s) Oral at bedtime PRN  NIFEdipine XL 90 milliGRAM(s) Oral daily  pantoprazole    Tablet 40 milliGRAM(s) Oral before breakfast  senna 2 Tablet(s) Oral at bedtime PRN    A/P:    Appears to have DM CKD 4 w/proteinuria and baseline Cr ~ 2 (Cr 2.13 - 3/20/21)  SONO w/small L kidney, no hydro  Etiology of BROCK not obvious, possibly ATN  Will check BMP in am  Check UA, lytes  Will f/u renal serologies  Avoid nephrotoxins  If Cr is higher in am, trial of [facility]    [phone]                      
Had hypoglycemia related to insulin.   Slept well.  Pain controlled, no chest pain, no N/V, no Fevers/Chills. No other new ROS.  Has been tolerating rehabilitation program.    VITALS  T(C): 36.2 (03-20-21 @ 20:38), Max: 36.6 (03-20-21 @ 09:28)  HR: 98 (03-21-21 @ 05:46) (96 - 98)  BP: 158/83 (03-21-21 @ 05:46) (123/70 - 158/83)  RR: 14 (03-21-21 @ 05:46) (14 - 16)  SpO2: 99% (03-21-21 @ 05:46) (98% - 100%)  Wt(kg): --     MEDICATIONS   acetaminophen   Tablet .. 650 milliGRAM(s) every 6 hours PRN  aspirin  chewable 81 milliGRAM(s) daily  atorvastatin 80 milliGRAM(s) at bedtime  dextrose 40% Gel 15 Gram(s) once  dextrose 5%. 1000 milliLiter(s) <Continuous>  dextrose 5%. 1000 milliLiter(s) <Continuous>  dextrose 50% Injectable 25 Gram(s) once  dextrose 50% Injectable 12.5 Gram(s) once  dextrose 50% Injectable 25 Gram(s) once  fenofibrate Tablet 48 milliGRAM(s) daily  glucagon  Injectable 1 milliGRAM(s) once  heparin   Injectable 5000 Unit(s) every 12 hours  insulin glargine Injectable (LANTUS) 18 Unit(s) every morning  insulin lispro (ADMELOG) corrective regimen sliding scale   three times a day before meals  insulin lispro (ADMELOG) corrective regimen sliding scale   at bedtime  insulin lispro Injectable (ADMELOG) 7 Unit(s) three times a day before meals  melatonin 3 milliGRAM(s) at bedtime  NIFEdipine XL 90 milliGRAM(s) daily  pantoprazole    Tablet 40 milliGRAM(s) before breakfast  senna 2 Tablet(s) at bedtime PRN      RECENT LABS/IMAGING                        12.1   10.63 )-----------( 387      ( 20 Mar 2021 06:00 )             37.2     03-20    138  |  104  |  41<H>  ----------------------------<  136<H>  4.4   |  26  |  2.13<H>    Ca    8.2<L>      20 Mar 2021 06:00    TPro  6.2  /  Alb  2.1<L>  /  TBili  0.2  /  DBili  x   /  AST  71<H>  /  ALT  79<H>  /  AlkPhos  68  03-20              POCT Blood Glucose.: 227 mg/dL (03-21-21 @ 07:29)  POCT Blood Glucose.: 282 mg/dL (03-20-21 @ 21:14)  POCT Blood Glucose.: 160 mg/dL (03-20-21 @ 18:15)  POCT Blood Glucose.: 86 mg/dL (03-20-21 @ 17:35)  POCT Blood Glucose.: 76 mg/dL (03-20-21 @ 16:56)  POCT Blood Glucose.: 55 mg/dL (03-20-21 @ 16:37)  POCT Blood Glucose.: 57 mg/dL (03-20-21 @ 16:36)  POCT Blood Glucose.: 182 mg/dL (03-20-21 @ 11:52)        ------------------------------------------  PHYSICAL EXAM  Constitutional - NAD, Comfortable  Pulm - Breathing comfortably, No wheezing  Abd - Nondistended  Extremities - No calf tenderness  Neurologic Exam - Awake, Alert  Psychiatric - Mood WNL     ASSESSMENT/PLAN  70y Female with impairments in mobility and ADLs   - Continue current rehabilitation program 3hrs a day   - Continue current medications, patient is medically stable - while monitoring FS, now since elevated and will need ongoing management given new to insulin  - DVT prophylaxis  - Skin - OOB and mobilization daily     
Medicine Progress Note    Patient is a 70y old  Female who presents with a chief complaint of 01.1 Left Body Involvement (Right Brain) (31 Mar 2021 14:14)      SUBJECTIVE / OVERNIGHT EVENTS:  seen and examined  Chart reviewed  No overnight events  Limb weakness improving with therapy  BM+  No pain      ADDITIONAL REVIEW OF SYSTEMS:  no fever/chills/CP/sob/palpitation/dizziness/ abd pain/nausea/vomiting/diarrhea/constipation/headaches. all other ROS neg    MEDICATIONS  (STANDING):  aspirin  chewable 81 milliGRAM(s) Oral daily  atorvastatin 80 milliGRAM(s) Oral at bedtime  carvedilol 12.5 milliGRAM(s) Oral every 12 hours  dextrose 40% Gel 15 Gram(s) Oral once  dextrose 5%. 1000 milliLiter(s) (50 mL/Hr) IV Continuous <Continuous>  dextrose 5%. 1000 milliLiter(s) (100 mL/Hr) IV Continuous <Continuous>  dextrose 50% Injectable 25 Gram(s) IV Push once  dextrose 50% Injectable 12.5 Gram(s) IV Push once  dextrose 50% Injectable 25 Gram(s) IV Push once  glucagon  Injectable 1 milliGRAM(s) IntraMuscular once  heparin   Injectable 5000 Unit(s) SubCutaneous every 12 hours  insulin glargine Injectable (LANTUS) 10 Unit(s) SubCutaneous every morning  insulin glargine Injectable (LANTUS) 10 Unit(s) SubCutaneous at bedtime  insulin lispro (ADMELOG) corrective regimen sliding scale   SubCutaneous three times a day before meals  insulin lispro (ADMELOG) corrective regimen sliding scale   SubCutaneous at bedtime  insulin lispro Injectable (ADMELOG) 7 Unit(s) SubCutaneous before breakfast  insulin lispro Injectable (ADMELOG) 5 Unit(s) SubCutaneous before lunch  insulin lispro Injectable (ADMELOG) 7 Unit(s) SubCutaneous before dinner  NIFEdipine XL 90 milliGRAM(s) Oral daily  traZODone 25 milliGRAM(s) Oral <User Schedule>    MEDICATIONS  (PRN):  acetaminophen   Tablet .. 650 milliGRAM(s) Oral every 6 hours PRN Temp greater or equal to 38C (100.4F), Mild Pain (1 - 3)  senna 2 Tablet(s) Oral at bedtime PRN Constipation      PHYSICAL EXAM:    Vital Signs Last 24 Hrs  T(C): 36.7 (02 Apr 2021 07:18), Max: 37.3 (01 Apr 2021 19:47)  T(F): 98 (02 Apr 2021 07:18), Max: 99.2 (01 Apr 2021 19:47)  HR: 73 (02 Apr 2021 07:18) (73 - 89)  BP: 111/64 (02 Apr 2021 07:18) (111/64 - 182/79)  BP(mean): --  RR: 14 (02 Apr 2021 07:18) (14 - 16)  SpO2: 97% (02 Apr 2021 07:18) (97% - 99%)      CAPILLARY BLOOD GLUCOSE      POCT Blood Glucose.: 155 mg/dL (02 Apr 2021 12:03)  POCT Blood Glucose.: 133 mg/dL (02 Apr 2021 08:00)  POCT Blood Glucose.: 186 mg/dL (01 Apr 2021 21:44)  POCT Blood Glucose.: 83 mg/dL (01 Apr 2021 16:56)      GENERAL: Not in distress. Alert    HEENT:  MMM  NECK: Supple.    CARDIOVASCULAR: RRR S1, S2. No murmur/rubs/gallop  LUNGS: BLAE+, no rales, no wheezing, no rhonchi.    ABDOMEN: ND. Soft,  NT, no guarding / rebound / rigidity.   EXTREMITIES: no edema. no leg or calf TP.  SKIN: warm and dry  NEUROLOGIC: AAO*3. left sided weakness  PSYCHIATRIC: Calm.  No agitation.    LABS:                        10.2   10.10 )-----------( 458      ( 01 Apr 2021 05:00 )             31.3     04-01    140  |  105  |  73<H>  ----------------------------<  161<H>  4.0   |  21<L>  |  2.90<H>    Ca    8.7      01 Apr 2021 05:00    TPro  6.4  /  Alb  2.4<L>  /  TBili  0.2  /  DBili  x   /  AST  28  /  ALT  35  /  AlkPhos  59  04-01      CARDIAC MARKERS ( 31 Mar 2021 05:00 )  x     / x     / 73 U/L / x     / x              COVID-19 PCR: NotDetec (30 Mar 2021 22:00)  COVID-19 PCR: NotDetec (19 Mar 2021 11:43)  COVID-19 PCR: NotDetec (14 Mar 2021 00:37)      RADIOLOGY & ADDITIONAL TESTS:  Imaging from Last 24 Hours:    Electrocardiogram/QTc Interval:    COORDINATION OF CARE:  Care Discussed with Consultants/Other Providers:  
[facility] NEPHROLOGY SERVICES, [facility]  NEPHROLOGY AND HYPERTENSION  [address]  [address]  [place] [address]  [phone]    MD ROSIO ROBIN MD ANDREY GONCHARUK, MD MADHU KORRAPATI, MD YELENA ROSENBERG, MD BINNY KOSHY, MD CHRISTOPHER CAPUTO, MD EDWARD BOVER, MD          Patient events noted    MEDICATIONS  (STANDING):  aspirin  chewable 81 milliGRAM(s) Oral daily  atorvastatin 80 milliGRAM(s) Oral at bedtime  carvedilol 6.25 milliGRAM(s) Oral every 12 hours  dextrose 40% Gel 15 Gram(s) Oral once  dextrose 5%. 1000 milliLiter(s) (50 mL/Hr) IV Continuous <Continuous>  dextrose 5%. 1000 milliLiter(s) (100 mL/Hr) IV Continuous <Continuous>  dextrose 50% Injectable 25 Gram(s) IV Push once  dextrose 50% Injectable 12.5 Gram(s) IV Push once  dextrose 50% Injectable 25 Gram(s) IV Push once  glucagon  Injectable 1 milliGRAM(s) IntraMuscular once  heparin   Injectable 5000 Unit(s) SubCutaneous every 12 hours  insulin glargine Injectable (LANTUS) 10 Unit(s) SubCutaneous every morning  insulin glargine Injectable (LANTUS) 10 Unit(s) SubCutaneous at bedtime  insulin lispro (ADMELOG) corrective regimen sliding scale   SubCutaneous three times a day before meals  insulin lispro (ADMELOG) corrective regimen sliding scale   SubCutaneous at bedtime  insulin lispro Injectable (ADMELOG) 7 Unit(s) SubCutaneous before breakfast  insulin lispro Injectable (ADMELOG) 5 Unit(s) SubCutaneous before lunch  insulin lispro Injectable (ADMELOG) 7 Unit(s) SubCutaneous before dinner  NIFEdipine XL 90 milliGRAM(s) Oral daily  sodium chloride 0.9%. 1000 milliLiter(s) (75 mL/Hr) IV Continuous <Continuous>  traZODone 25 milliGRAM(s) Oral <User Schedule>    MEDICATIONS  (PRN):  acetaminophen   Tablet .. 650 milliGRAM(s) Oral every 6 hours PRN Temp greater or equal to 38C (100.4F), Mild Pain (1 - 3)  senna 2 Tablet(s) Oral at bedtime PRN Constipation      03-28-21 @ 07:01  -  03-29-21 @ 07:00  --------------------------------------------------------  IN: 600 mL / OUT: 0 mL / NET: 600 mL    03-29-21 @ 07:01  -  03-29-21 @ 21:04  --------------------------------------------------------  IN: 825 mL / OUT: 0 mL / NET: 825 mL      PHYSICAL EXAM:      T(C): --  HR: 97 (03-29-21 @ 06:00) (97 - 97)  BP: 159/87 (03-29-21 @ 06:00) (159/87 - 159/87)  RR: --  SpO2: --  Wt(kg): --  Lungs clear  Heart S1S2  Abd soft NT ND  Extremities:   tr edema                                    9.3    10.58 )-----------( 399      ( 29 Mar 2021 05:00 )             28.2     03-29    139  |  107  |  72<H>  ----------------------------<  259<H>  4.2   |  23  |  3.47<H>    Ca    8.4      29 Mar 2021 05:00    TPro  5.9<L>  /  Alb  2.1<L>  /  TBili  0.1<L>  /  DBili  x   /  AST  16  /  ALT  25  /  AlkPhos  56  03-29      Eosinophil Count, Urine (03.25.21 @ 20:50)    Eosinophil Count, Urine: Negative    Urine Microscopic-Add On (NC) (03.25.21 @ 20:50)    Red Blood Cell - Urine: 0-4 /HPF    White Blood Cell - Urine: 0-2 /HPF    Bacteria: Negative /HPF    Epithelial Cells: Neg.-Few        LIVER FUNCTIONS - ( 29 Mar 2021 05:00 )  Alb: 2.1 g/dL / Pro: 5.9 g/dL / ALK PHOS: 56 U/L / ALT: 25 U/L / AST: 16 U/L / GGT: x             Antineutrophil Cytoplasmic Antibody (03.25.21 @ 07:37)    Perinuclear (p-ANCA) Antibody: Negative    Cytoplasmic (c-ANCA) Antibody: Negative    Atypical ANCA: Negative    Anti-Nuclear Antibody in AM (03.25.21 @ 07:37)    Anti Nuclear Factor Titer: Negative      Creatinine Trend: 3.47<--, 3.35<--, 1.10<--, 2.67<--, 2.99<--, 2.96<--    Assessment     Appears to have DM CKD 4 w/proteinuria and baseline Cr ~ 2 (Cr 2.13 - 3/20/21)  SONO w/small L kidney, no hydro  Etiology of BROCK not obvious, possibly pre-enal     Plan  Gentle IVF  Bladder scan  BMP in am  Avoid nephrotoxins      King Walls MD
Patient is a 70y old  Female who presents with a chief complaint of 01.1 Left Body Involvement (Right Brain) (23 Mar 2021 09:02)    69 y/o woman with pmhx of CAD s/p stent 6 years ago, Dm2, ?CKD, HTN who presents to the ER with 1-2 days of generalized weakness.  Reportedly per her son she had fallen and was slightly confused but with no LOC or head strike. She was found to have slurred speech at some point as well. She denies any headache or vision changes. Last normal per son around 10 PM friday. in the ER pt was given asa, ceftriaxone and 2L NS.   CT head revealed acute cva involving R internal capsule, R corona radiata.   Medically stable for transfer to [facility] on 3/19/2021. Patient seen and examined at bedside, no complaints.  (19 Mar 2021 17:06)      PAST MEDICAL & SURGICAL HISTORY:  Hypertension  DM (diabetes mellitus)  CAD (coronary artery disease)  No significant past surgical history    Allergies  No Known Allergies  Intolerances    TODAY'S SUBJECTIVE & REVIEW OF SYMPTOMS:  Patient seen and examined today, no acute events overnight. slept well last night. Doing well in therapy, denies headache, chest pain, shortness of breath, nausea, vomiting, diarrhea, or constipation.      PHYSICAL EXAM Vital Signs Last 24 Hrs  T(C): 36.8 (25 Mar 2021 09:18), Max: 36.8 (25 Mar 2021 05:28)  T(F): 98.3 (25 Mar 2021 09:18), Max: 98.3 (25 Mar 2021 09:18)  HR: 87 (25 Mar 2021 09:18) (64 - 96)  BP: 158/73 (25 Mar 2021 09:18) (137/67 - 158/73)  BP(mean): --  RR: 18 (25 Mar 2021 09:18) (18 - 18)  SpO2: 99% (25 Mar 2021 09:18) (99% - 100%)  ----------------------------------------------------------------------------------------  PHYSICAL EXAM  Constitutional - NAD, Comfortable  HEENT - NCAT, EOMI  Neck - Supple, No limited ROM  Chest - Breathing comfortably, No wheezing  Cardiovascular - S1S2   Abdomen - Soft   Extremities - No C/C/E, No calf tenderness   Neurologic Exam -                    Cognitive - Awake, Alert, AAO to self, place, date, year, situation     Communication - Fluent, No dysarthria     Cranial Nerves - slight left facial droop     Motor - No focal deficits                    LEFT    UE - ShAB 4/5, EF 4/5, EE 4/5, WE 4/5,  4/5                    RIGHT UE - ShAB 5/5, EF 5/5, EE 5/5, WE 5/5,  5/5                    LEFT    LE - HF 4/5, KE 4/5, DF 4/5, PF 4/5                    RIGHT LE - HF 5/5, KE 5/5, DF 5/5, PF 5/5        Sensory - Intact to LT     Reflexes - DTR Intact     Coordination - FTN intact on right; slight difficulty on left     OculoVestibular - No saccades, No nystagmus, VOR      Psychiatric - Mood stable, Affect WNL        RECENT LABS:                       10.9   11.10 )-----------( 410      ( 25 Mar 2021 07:37 )             34.0     03-25    140  |  107  |  59<H>  ----------------------------<  193<H>  4.7   |  27  |  2.96<H>    Ca    8.7      25 Mar 2021 07:37    TPro  6.6  /  Alb  2.4<L>  /  TBili  0.2  /  DBili  x   /  AST  24  /  ALT  33  /  AlkPhos  63  03-25            CAPILLARY BLOOD GLUCOSE  POCT Blood Glucose.: 159 mg/dL (23 Mar 2021 11:33)  POCT Blood Glucose.: 140 mg/dL (23 Mar 2021 07:55)  POCT Blood Glucose.: 217 mg/dL (22 Mar 2021 21:12)  POCT Blood Glucose.: 91 mg/dL (22 Mar 2021 16:43)  POCT Blood Glucose.: 120 mg/dL (22 Mar 2021 12:14)      MEDICATIONS  (STANDING):  aspirin  chewable 81 milliGRAM(s) Oral daily  atorvastatin 80 milliGRAM(s) Oral at bedtime  carvedilol 6.25 milliGRAM(s) Oral every 12 hours  dextrose 40% Gel 15 Gram(s) Oral once  dextrose 5%. 1000 milliLiter(s) (50 mL/Hr) IV Continuous <Continuous>  dextrose 5%. 1000 milliLiter(s) (100 mL/Hr) IV Continuous <Continuous>  dextrose 50% Injectable 25 Gram(s) IV Push once  dextrose 50% Injectable 12.5 Gram(s) IV Push once  dextrose 50% Injectable 25 Gram(s) IV Push once  fenofibrate Tablet 48 milliGRAM(s) Oral daily  glucagon  Injectable 1 milliGRAM(s) IntraMuscular once  heparin   Injectable 5000 Unit(s) SubCutaneous every 12 hours  insulin glargine Injectable (LANTUS) 18 Unit(s) SubCutaneous every morning  insulin lispro (ADMELOG) corrective regimen sliding scale   SubCutaneous three times a day before meals  insulin lispro (ADMELOG) corrective regimen sliding scale   SubCutaneous at bedtime  insulin lispro Injectable (ADMELOG) 7 Unit(s) SubCutaneous three times a day before meals  melatonin 3 milliGRAM(s) Oral at bedtime  NIFEdipine XL 90 milliGRAM(s) Oral daily  pantoprazole    Tablet 40 milliGRAM(s) Oral before breakfast    MEDICATIONS  (PRN):  acetaminophen   Tablet .. 650 milliGRAM(s) Oral every 6 hours PRN Temp greater or equal to 38C (100.4F), Mild Pain (1 - 3)  senna 2 Tablet(s) Oral at bedtime PRN Constipation      IMPRESSION AND PLAN:  69 y/o woman with pmhx of CAD s/p stent 6 years ago, Dm2, ?CKD, HTN, found to have R internal capsule, R corona radiata. Now with left hemiparesis, dysphasia.    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- will need outpatient follow up  - Avoid nephrotoxic agents  - Monitor BMP- Consider renal consult if worsens  - CR 2.9 (3/24), renal consult, PVR check Q6h    #DM A1C 14  - Continue with Lantus 18U  - CAMRON - 7U pre-prandial  - Holding home oral DM regimen- hold metformin for current GFR  - Unable to start ACEI/ARB for proteinuria due to BROCK/CKD   - Diabetes Education from Diabetes Nurse Practitioner- patient declined learning to do her own fingersticks-  - If BG readings remain in current range- Discharge on Lantus or equivalent substitute  18 units every morning, Admelog or equivalent substitute 7 units before each meal.  FU with Endocrinology upon discharge    #Pain control  - Tylenol PRN    #Insomnia  - increase melatonin from 3mg to 6mg     #GI/Bowel Mgmt   - Continent c/w Senna    #Bladder management  - Continent,     #DVT  - Heparin  - SCDs, TEDs     #Skin:  -No active issues at this time    FEN   - Diet - Dysphagia 2 Mechanical Soft-Thin Liquids; Consistent Carb/DASH    - Dysphagia  SLP - evaluation and treatment    Precautions / PROPHYLAXIS:   - Falls, Cardiac  - ortho: Weight bearing status: WBAT   - Lungs: Aspiration, Incentive Spirometer   - Pressure injury/Skin: Turn Q2hrs while in bed, OOB to Chair, PT/OT/SLP    Dispo: IDT Round 3/23/21  - Lives in private home with , 4 TRESSA and 10 steps inside  - Supervision for eating, grooming, Min A for lower body dressing and toilet transfers  - Goal for Mod I for self care and supervision for shower transfers  - SLP - reduced awareness deficits, mild deficits comprehension  - Tentative DC date 4/2/21 Home with Home Care    Followups:  Libman, Richard B (MD)  Neurology; Vascular Neurology  [address], [address]  [place] [address]  Phone: [phone]  Fax: [phone]  Follow Up Time:     Lillian Koch)  EndocrinologyMetabDiabetes  [address] [place] [address]  Phone: [phone]  Fax: [phone]      
Patient is a 70y old  Female who presents with a chief complaint of 01.1 Left Body Involvement (Right Brain) (23 Mar 2021 09:02)    69 y/o woman with pmhx of CAD s/p stent 6 years ago, Dm2, ?CKD, HTN who presents to the ER with 1-2 days of generalized weakness.  Reportedly per her son she had fallen and was slightly confused but with no LOC or head strike. She was found to have slurred speech at some point as well. She denies any headache or vision changes. Last normal per son around 10 PM friday. in the ER pt was given asa, ceftriaxone and 2L NS.   CT head revealed acute cva involving R internal capsule, R corona radiata.   Medically stable for transfer to [facility] on 3/19/2021. Patient seen and examined at bedside, no complaints.  (19 Mar 2021 17:06)      PAST MEDICAL & SURGICAL HISTORY:  Hypertension  DM (diabetes mellitus)  CAD (coronary artery disease)  No significant past surgical history    Allergies  No Known Allergies  Intolerances    TODAY'S SUBJECTIVE & REVIEW OF SYMPTOMS:  Patient seen and evaluated this morning. No acute events overnight. Participating well in therapy. Pain is well controlled. Denies chest pain, SOB, nausea, vomiting, constipation or diarrhea.Didn't sleep well last night even with increase in melatonin.     PHYSICAL EXAM Vital Signs Last 24 Hrs   Vital Signs Last 24 Hrs  T(C): 36.8 (26 Mar 2021 08:47), Max: 36.8 (25 Mar 2021 19:44)  T(F): 98.2 (26 Mar 2021 08:47), Max: 98.2 (25 Mar 2021 19:44)  HR: 85 (26 Mar 2021 08:47) (85 - 89)  BP: 103/65 (26 Mar 2021 08:47) (103/65 - 169/82)  BP(mean): --  RR: 14 (26 Mar 2021 08:47) (14 - 16)  SpO2: 100% (26 Mar 2021 08:47) (95% - 100%)  ----------------------------------------------------------------------------------------  PHYSICAL EXAM  Constitutional - NAD, Comfortable  HEENT - NCAT, EOMI  Neck - Supple, No limited ROM  Chest - Breathing comfortably, No wheezing  Cardiovascular - S1S2   Abdomen - Soft   Extremities - No C/C/E, No calf tenderness   Neurologic Exam -                    Cognitive - Awake, Alert, AAO to self, place, date, year, situation     Communication - Fluent, No dysarthria     Cranial Nerves - slight left facial droop     Motor - No focal deficits                    LEFT    UE - ShAB 4/5, EF 4/5, EE 4/5, WE 4/5,  4/5                    RIGHT UE - ShAB 5/5, EF 5/5, EE 5/5, WE 5/5,  5/5                    LEFT    LE - HF 4/5, KE 4/5, DF 4/5, PF 4/5                    RIGHT LE - HF 5/5, KE 5/5, DF 5/5, PF 5/5        Sensory - Intact to LT     Reflexes - DTR Intact     Coordination - FTN intact on right; slight difficulty on left     OculoVestibular - No saccades, No nystagmus, VOR      Psychiatric - Mood stable, Affect WNL        RECENT LABS:                              10.9   11.10 )-----------( 410      ( 25 Mar 2021 07:37 )             34.0     03-25    140  |  107  |  59<H>  ----------------------------<  193<H>  4.7   |  27  |  2.96<H>    Ca    8.7      25 Mar 2021 07:37    TPro  5.5<L>  /  Alb  2.4<L>  /  TBili  0.2  /  DBili  x   /  AST  24  /  ALT  33  /  AlkPhos  63  03-25      Urinalysis Basic - ( 25 Mar 2021 20:50 )    Color: Yellow / Appearance: Clear / S.010 / pH: x  Gluc: x / Ketone: Negative  / Bili: Negative / Urobili: Negative   Blood: x / Protein: 500 mg/dL / Nitrite: Negative   Leuk Esterase: Negative / RBC: 0-4 /HPF / WBC 0-2 /HPF   Sq Epi: x / Non Sq Epi: Neg.-Few / Bacteria: Negative /HPF        MEDICATIONS  (STANDING):  aspirin  chewable 81 milliGRAM(s) Oral daily  atorvastatin 80 milliGRAM(s) Oral at bedtime  carvedilol 6.25 milliGRAM(s) Oral every 12 hours  dextrose 40% Gel 15 Gram(s) Oral once  dextrose 5%. 1000 milliLiter(s) (50 mL/Hr) IV Continuous <Continuous>  dextrose 5%. 1000 milliLiter(s) (100 mL/Hr) IV Continuous <Continuous>  dextrose 50% Injectable 25 Gram(s) IV Push once  dextrose 50% Injectable 12.5 Gram(s) IV Push once  dextrose 50% Injectable 25 Gram(s) IV Push once  fenofibrate Tablet 48 milliGRAM(s) Oral daily  glucagon  Injectable 1 milliGRAM(s) IntraMuscular once  heparin   Injectable 5000 Unit(s) SubCutaneous every 12 hours  insulin glargine Injectable (LANTUS) 18 Unit(s) SubCutaneous every morning  insulin glargine Injectable (LANTUS) 3 Unit(s) SubCutaneous at bedtime  insulin lispro (ADMELOG) corrective regimen sliding scale   SubCutaneous three times a day before meals  insulin lispro (ADMELOG) corrective regimen sliding scale   SubCutaneous at bedtime  insulin lispro Injectable (ADMELOG) 7 Unit(s) SubCutaneous before breakfast  insulin lispro Injectable (ADMELOG) 5 Unit(s) SubCutaneous before lunch  insulin lispro Injectable (ADMELOG) 7 Unit(s) SubCutaneous before dinner  NIFEdipine XL 90 milliGRAM(s) Oral daily  pantoprazole    Tablet 40 milliGRAM(s) Oral before breakfast    MEDICATIONS  (PRN):  acetaminophen   Tablet .. 650 milliGRAM(s) Oral every 6 hours PRN Temp greater or equal to 38C (100.4F), Mild Pain (1 - 3)  melatonin 9 milliGRAM(s) Oral at bedtime PRN Insomnia  senna 2 Tablet(s) Oral at bedtime PRN Constipation        IMPRESSION AND PLAN:  69 y/o woman with pmhx of CAD s/p stent 6 years ago, Dm2, ?CKD, HTN, found to have R internal capsule, R corona radiata. Now with left hemiparesis, dysphasia.    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- will need outpatient follow up  - Avoid nephrotoxic agents  - Monitor BMP- Consider renal consult if worsens  - CR 2.9 (3/24), renal consult, PVR check Q6h    #DM A1C 14  - Continue with Lantus 18U  - CAMRON - 7U pre-prandial  - Holding home oral DM regimen- hold metformin for current GFR  - Unable to start ACEI/ARB for proteinuria due to BROCK/CKD   - Diabetes Education from Diabetes Nurse Practitioner- patient declined learning to do her own fingersticks-  - If BG readings remain in current range- Discharge on Lantus or equivalent substitute  18 units every morning, Admelog or equivalent substitute 7 units before each meal.  FU with Endocrinology upon discharge    #Pain control  - Tylenol PRN    #Insomnia  - increase melatonin from 3mg to 6mg and then 9 mg. Didn't help sleep. Plan to changed melatonin to 3 mg and start trazodone 25 mg (3/26)    #GI/Bowel Mgmt   - Continent c/w Senna    #Bladder management  - Continent,     #DVT  - Heparin  - SCDs, TEDs     #Skin:  -No active issues at this time    FEN   - Diet - Dysphagia 2 Mechanical Soft-Thin Liquids; Consistent Carb/DASH    - Dysphagia  SLP - evaluation and treatment    Precautions / PROPHYLAXIS:   - Falls, Cardiac  - ortho: Weight bearing status: WBAT   - Lungs: Aspiration, Incentive Spirometer   - Pressure injury/Skin: Turn Q2hrs while in bed, OOB to Chair, PT/OT/SLP    Dispo: IDT Round 3/23/21  - Lives in private home with , 4 TRESSA and 10 steps inside  - Supervision for eating, grooming, Min A for lower body dressing and toilet transfers  - Goal for Mod I for self care and supervision for shower transfers  - SLP - reduced awareness deficits, mild deficits comprehension  - Tentative DC date 21 Home with Home Care    Followups:  Libman, Richard B (MD)  Neurology; Vascular Neurology  [address], [address]  [place] [address]  Phone: [phone]  Fax: [phone]  Follow Up Time:     Lillian Koch (MD)  EndocrinologyMetabDiabetes  [address] [place] [address]  Phone: [phone]  Fax: [phone]      
Patient is a 70y old  Female who presents with a chief complaint of 01.1 Left Body Involvement (Right Brain) (24 Mar 2021 22:12)      SUBJECTIVE / OVERNIGHT EVENTS:  Pt seen and examined at bedside. No acute events overnight.  Pt denies cp, palpitations, sob, abd pain, N/V, fever, chills.    ROS:  All other review of systems negative    Allergies    No Known Allergies    Intolerances        MEDICATIONS  (STANDING):  aspirin  chewable 81 milliGRAM(s) Oral daily  atorvastatin 80 milliGRAM(s) Oral at bedtime  carvedilol 6.25 milliGRAM(s) Oral every 12 hours  dextrose 40% Gel 15 Gram(s) Oral once  dextrose 5%. 1000 milliLiter(s) (50 mL/Hr) IV Continuous <Continuous>  dextrose 5%. 1000 milliLiter(s) (100 mL/Hr) IV Continuous <Continuous>  dextrose 50% Injectable 25 Gram(s) IV Push once  dextrose 50% Injectable 12.5 Gram(s) IV Push once  dextrose 50% Injectable 25 Gram(s) IV Push once  fenofibrate Tablet 48 milliGRAM(s) Oral daily  glucagon  Injectable 1 milliGRAM(s) IntraMuscular once  heparin   Injectable 5000 Unit(s) SubCutaneous every 12 hours  insulin glargine Injectable (LANTUS) 18 Unit(s) SubCutaneous every morning  insulin lispro (ADMELOG) corrective regimen sliding scale   SubCutaneous three times a day before meals  insulin lispro (ADMELOG) corrective regimen sliding scale   SubCutaneous at bedtime  insulin lispro Injectable (ADMELOG) 7 Unit(s) SubCutaneous before breakfast  insulin lispro Injectable (ADMELOG) 5 Unit(s) SubCutaneous before lunch  insulin lispro Injectable (ADMELOG) 7 Unit(s) SubCutaneous before dinner  NIFEdipine XL 90 milliGRAM(s) Oral daily  pantoprazole    Tablet 40 milliGRAM(s) Oral before breakfast    MEDICATIONS  (PRN):  acetaminophen   Tablet .. 650 milliGRAM(s) Oral every 6 hours PRN Temp greater or equal to 38C (100.4F), Mild Pain (1 - 3)  melatonin 9 milliGRAM(s) Oral at bedtime PRN Insomnia  senna 2 Tablet(s) Oral at bedtime PRN Constipation      Vital Signs Last 24 Hrs  T(C): 36.8 (25 Mar 2021 09:18), Max: 36.8 (25 Mar 2021 05:28)  T(F): 98.3 (25 Mar 2021 09:18), Max: 98.3 (25 Mar 2021 09:18)  HR: 87 (25 Mar 2021 09:18) (64 - 96)  BP: 158/73 (25 Mar 2021 09:18) (137/67 - 158/73)  BP(mean): --  RR: 18 (25 Mar 2021 09:18) (18 - 18)  SpO2: 99% (25 Mar 2021 09:18) (99% - 100%)  CAPILLARY BLOOD GLUCOSE      POCT Blood Glucose.: 208 mg/dL (25 Mar 2021 07:56)  POCT Blood Glucose.: 239 mg/dL (24 Mar 2021 21:04)  POCT Blood Glucose.: 86 mg/dL (24 Mar 2021 17:00)  POCT Blood Glucose.: 208 mg/dL (24 Mar 2021 11:52)    I&O's Summary      PHYSICAL EXAM:  GENERAL: NAD, thin female  HEAD:  Atraumatic, Normocephalic  CHEST/LUNG: Clear to auscultation bilaterally; No wheeze, nonlabored breathing  HEART: Regular rate and rhythm; No murmurs, rubs, or gallops  ABDOMEN: Soft, Nontender, Nondistended; Bowel sounds present  EXTREMITIES:  2+ Peripheral Pulses, No clubbing, cyanosis, or edema  NEUROLOGY: AAOx3, non-focal  PSYCH: calm, appropriate mood    LABS:                        10.9   11.10 )-----------( 410      ( 25 Mar 2021 07:37 )             34.0     03-25    140  |  107  |  59<H>  ----------------------------<  193<H>  4.7   |  27  |  2.96<H>    Ca    8.7      25 Mar 2021 07:37    TPro  6.6  /  Alb  2.4<L>  /  TBili  0.2  /  DBili  x   /  AST  24  /  ALT  33  /  AlkPhos  63  03-25              RADIOLOGY & ADDITIONAL TESTS:  Results Reviewed:   Imaging Personally Reviewed:  Electrocardiogram Personally Reviewed:    COORDINATION OF CARE:  Care Discussed with Consultants/Other Providers [Y/N]:  Prior or Outpatient Records Reviewed [Y/N]:
Patient is a 70y old  Female who presents with a chief complaint of 01.1 Left Body Involvement (Right Brain) (25 Mar 2021 19:36)      SUBJECTIVE / OVERNIGHT EVENTS:  Pt seen and examined at bedside. No acute events overnight.  Pt denies cp, palpitations, sob, abd pain, N/V, fever, chills.    ROS:  All other review of systems negative    Allergies    No Known Allergies    Intolerances        MEDICATIONS  (STANDING):  aspirin  chewable 81 milliGRAM(s) Oral daily  atorvastatin 80 milliGRAM(s) Oral at bedtime  carvedilol 6.25 milliGRAM(s) Oral every 12 hours  dextrose 40% Gel 15 Gram(s) Oral once  dextrose 5%. 1000 milliLiter(s) (50 mL/Hr) IV Continuous <Continuous>  dextrose 5%. 1000 milliLiter(s) (100 mL/Hr) IV Continuous <Continuous>  dextrose 50% Injectable 25 Gram(s) IV Push once  dextrose 50% Injectable 12.5 Gram(s) IV Push once  dextrose 50% Injectable 25 Gram(s) IV Push once  fenofibrate Tablet 48 milliGRAM(s) Oral daily  glucagon  Injectable 1 milliGRAM(s) IntraMuscular once  heparin   Injectable 5000 Unit(s) SubCutaneous every 12 hours  insulin glargine Injectable (LANTUS) 18 Unit(s) SubCutaneous every morning  insulin lispro (ADMELOG) corrective regimen sliding scale   SubCutaneous three times a day before meals  insulin lispro (ADMELOG) corrective regimen sliding scale   SubCutaneous at bedtime  insulin lispro Injectable (ADMELOG) 7 Unit(s) SubCutaneous before breakfast  insulin lispro Injectable (ADMELOG) 5 Unit(s) SubCutaneous before lunch  insulin lispro Injectable (ADMELOG) 7 Unit(s) SubCutaneous before dinner  NIFEdipine XL 90 milliGRAM(s) Oral daily  pantoprazole    Tablet 40 milliGRAM(s) Oral before breakfast    MEDICATIONS  (PRN):  acetaminophen   Tablet .. 650 milliGRAM(s) Oral every 6 hours PRN Temp greater or equal to 38C (100.4F), Mild Pain (1 - 3)  melatonin 9 milliGRAM(s) Oral at bedtime PRN Insomnia  senna 2 Tablet(s) Oral at bedtime PRN Constipation      Vital Signs Last 24 Hrs  T(C): 36.8 (26 Mar 2021 08:47), Max: 36.8 (25 Mar 2021 19:44)  T(F): 98.2 (26 Mar 2021 08:47), Max: 98.2 (25 Mar 2021 19:44)  HR: 85 (26 Mar 2021 08:47) (85 - 89)  BP: 103/65 (26 Mar 2021 08:47) (103/65 - 169/82)  BP(mean): --  RR: 14 (26 Mar 2021 08:47) (14 - 16)  SpO2: 100% (26 Mar 2021 08:47) (95% - 100%)  CAPILLARY BLOOD GLUCOSE      POCT Blood Glucose.: 178 mg/dL (26 Mar 2021 08:00)  POCT Blood Glucose.: 206 mg/dL (25 Mar 2021 21:36)  POCT Blood Glucose.: 180 mg/dL (25 Mar 2021 16:53)  POCT Blood Glucose.: 123 mg/dL (25 Mar 2021 12:07)    I&O's Summary      PHYSICAL EXAM:  GENERAL: NAD, thin female  HEAD:  Atraumatic, Normocephalic  CHEST/LUNG: Clear to auscultation bilaterally; No wheeze, nonlabored breathing  HEART: Regular rate and rhythm; No murmurs, rubs, or gallops  ABDOMEN: Soft, Nontender, Nondistended; Bowel sounds present  EXTREMITIES:  2+ Peripheral Pulses, No clubbing, cyanosis, or edema  NEUROLOGY: AAOx3, non-focal  PSYCH: calm, appropriate mood    LABS:                        10.9   11.10 )-----------( 410      ( 25 Mar 2021 07:37 )             34.0     03-25    140  |  107  |  59<H>  ----------------------------<  193<H>  4.7   |  27  |  2.96<H>    Ca    8.7      25 Mar 2021 07:37    TPro  5.5<L>  /  Alb  2.4<L>  /  TBili  0.2  /  DBili  x   /  AST  24  /  ALT  33  /  AlkPhos  63  03-25          Urinalysis Basic - ( 25 Mar 2021 20:50 )    Color: Yellow / Appearance: Clear / S.010 / pH: x  Gluc: x / Ketone: Negative  / Bili: Negative / Urobili: Negative   Blood: x / Protein: 500 mg/dL / Nitrite: Negative   Leuk Esterase: Negative / RBC: 0-4 /HPF / WBC 0-2 /HPF   Sq Epi: x / Non Sq Epi: Neg.-Few / Bacteria: Negative /HPF        RADIOLOGY & ADDITIONAL TESTS:  Results Reviewed:   Imaging Personally Reviewed:  Electrocardiogram Personally Reviewed:    COORDINATION OF CARE:  Care Discussed with Consultants/Other Providers [Y/N]:  Prior or Outpatient Records Reviewed [Y/N]:
Patient is a 70y old  Female who presents with a chief complaint of 01.1 Left Body Involvement (Right Brain) (29 Mar 2021 12:28)      SUBJECTIVE / OVERNIGHT EVENTS:  Pt seen and examined at bedside. No acute events overnight.  Pt denies cp, palpitations, sob, abd pain, N/V, fever, chills.    ROS:  All other review of systems negative    Allergies    No Known Allergies    Intolerances        MEDICATIONS  (STANDING):  aspirin  chewable 81 milliGRAM(s) Oral daily  atorvastatin 80 milliGRAM(s) Oral at bedtime  carvedilol 6.25 milliGRAM(s) Oral every 12 hours  dextrose 40% Gel 15 Gram(s) Oral once  dextrose 5%. 1000 milliLiter(s) (50 mL/Hr) IV Continuous <Continuous>  dextrose 5%. 1000 milliLiter(s) (100 mL/Hr) IV Continuous <Continuous>  dextrose 50% Injectable 25 Gram(s) IV Push once  dextrose 50% Injectable 12.5 Gram(s) IV Push once  dextrose 50% Injectable 25 Gram(s) IV Push once  glucagon  Injectable 1 milliGRAM(s) IntraMuscular once  heparin   Injectable 5000 Unit(s) SubCutaneous every 12 hours  insulin glargine Injectable (LANTUS) 10 Unit(s) SubCutaneous every morning  insulin glargine Injectable (LANTUS) 10 Unit(s) SubCutaneous at bedtime  insulin lispro (ADMELOG) corrective regimen sliding scale   SubCutaneous three times a day before meals  insulin lispro (ADMELOG) corrective regimen sliding scale   SubCutaneous at bedtime  insulin lispro Injectable (ADMELOG) 7 Unit(s) SubCutaneous before breakfast  insulin lispro Injectable (ADMELOG) 5 Unit(s) SubCutaneous before lunch  insulin lispro Injectable (ADMELOG) 7 Unit(s) SubCutaneous before dinner  NIFEdipine XL 90 milliGRAM(s) Oral daily  sodium chloride 0.9%. 1000 milliLiter(s) (75 mL/Hr) IV Continuous <Continuous>  traZODone 25 milliGRAM(s) Oral <User Schedule>    MEDICATIONS  (PRN):  acetaminophen   Tablet .. 650 milliGRAM(s) Oral every 6 hours PRN Temp greater or equal to 38C (100.4F), Mild Pain (1 - 3)  senna 2 Tablet(s) Oral at bedtime PRN Constipation      Vital Signs Last 24 Hrs  T(C): 36.6 (28 Mar 2021 20:35), Max: 36.6 (28 Mar 2021 20:35)  T(F): 97.8 (28 Mar 2021 20:35), Max: 97.8 (28 Mar 2021 20:35)  HR: 97 (29 Mar 2021 06:00) (92 - 97)  BP: 159/87 (29 Mar 2021 06:00) (144/80 - 159/87)  BP(mean): --  RR: 15 (28 Mar 2021 20:35) (15 - 15)  SpO2: 98% (28 Mar 2021 20:35) (98% - 98%)  CAPILLARY BLOOD GLUCOSE      POCT Blood Glucose.: 84 mg/dL (29 Mar 2021 12:04)  POCT Blood Glucose.: 256 mg/dL (29 Mar 2021 07:37)  POCT Blood Glucose.: 187 mg/dL (28 Mar 2021 20:37)  POCT Blood Glucose.: 212 mg/dL (28 Mar 2021 16:55)    I&O's Summary    28 Mar 2021 07:01  -  29 Mar 2021 07:00  --------------------------------------------------------  IN: 600 mL / OUT: 0 mL / NET: 600 mL        PHYSICAL EXAM:  GENERAL: NAD, thin female  HEAD:  Atraumatic, Normocephalic  CHEST/LUNG: Clear to auscultation bilaterally; No wheeze, nonlabored breathing  HEART: Regular rate and rhythm; No murmurs, rubs, or gallops  ABDOMEN: Soft, Nontender, Nondistended; Bowel sounds present  EXTREMITIES:  2+ Peripheral Pulses, No clubbing, cyanosis, or edema  NEUROLOGY: AAOx3, non-focal  PSYCH: calm, appropriate mood    LABS:                        9.3    10.58 )-----------( 399      ( 29 Mar 2021 05:00 )             28.2     03-29    139  |  107  |  72<H>  ----------------------------<  259<H>  4.2   |  23  |  3.47<H>    Ca    8.4      29 Mar 2021 05:00    TPro  5.9<L>  /  Alb  2.1<L>  /  TBili  0.1<L>  /  DBili  x   /  AST  16  /  ALT  25  /  AlkPhos  56  03-29              RADIOLOGY & ADDITIONAL TESTS:  Results Reviewed:   Imaging Personally Reviewed:  Electrocardiogram Personally Reviewed:    COORDINATION OF CARE:  Care Discussed with Consultants/Other Providers [Y/N]:  Prior or Outpatient Records Reviewed [Y/N]:
Cc: Gait dysfunction    HPI: Patient with no new medical issues today.  Patient reports that therapy is going well.  No new issues overnight.  Pain controlled, no chest pain, no N/V, no Fevers/Chills. No other new ROS  Has been tolerating rehabilitation program.    acetaminophen   Tablet .. 650 milliGRAM(s) Oral every 6 hours PRN  aspirin  chewable 81 milliGRAM(s) Oral daily  atorvastatin 80 milliGRAM(s) Oral at bedtime  carvedilol 6.25 milliGRAM(s) Oral every 12 hours  dextrose 40% Gel 15 Gram(s) Oral once  dextrose 5%. 1000 milliLiter(s) IV Continuous <Continuous>  dextrose 5%. 1000 milliLiter(s) IV Continuous <Continuous>  dextrose 50% Injectable 12.5 Gram(s) IV Push once  dextrose 50% Injectable 25 Gram(s) IV Push once  dextrose 50% Injectable 25 Gram(s) IV Push once  fenofibrate Tablet 48 milliGRAM(s) Oral daily  glucagon  Injectable 1 milliGRAM(s) IntraMuscular once  heparin   Injectable 5000 Unit(s) SubCutaneous every 12 hours  insulin glargine Injectable (LANTUS) 18 Unit(s) SubCutaneous every morning  insulin glargine Injectable (LANTUS) 3 Unit(s) SubCutaneous at bedtime  insulin lispro (ADMELOG) corrective regimen sliding scale   SubCutaneous three times a day before meals  insulin lispro (ADMELOG) corrective regimen sliding scale   SubCutaneous at bedtime  insulin lispro Injectable (ADMELOG) 7 Unit(s) SubCutaneous before breakfast  insulin lispro Injectable (ADMELOG) 5 Unit(s) SubCutaneous before lunch  insulin lispro Injectable (ADMELOG) 7 Unit(s) SubCutaneous before dinner  melatonin 3 milliGRAM(s) Oral at bedtime  NIFEdipine XL 90 milliGRAM(s) Oral daily  pantoprazole    Tablet 40 milliGRAM(s) Oral before breakfast  senna 2 Tablet(s) Oral at bedtime PRN  sodium chloride 0.45%. 1000 milliLiter(s) IV Continuous <Continuous>  traZODone 25 milliGRAM(s) Oral at bedtime      T(C): 37 (03-27-21 @ 07:30), Max: 37.2 (03-26-21 @ 20:35)  HR: 87 (03-27-21 @ 07:30) (87 - 96)  BP: 163/84 (03-27-21 @ 07:30) (155/89 - 163/84)  RR: 16 (03-27-21 @ 07:30) (14 - 16)  SpO2: 100% (03-27-21 @ 07:30) (96% - 100%)    In NAD  HEENT- EOMI  Heart- well perfused  Lungs- breathing well on RA  Abd- + BS, NT, ND  Ext- No calf pain  Neuro- Exam unchanged          Imp: Patient with diagnosis of Rt CVA w/ left sided weakness admitted for comprehensive acute rehabilitation.    Plan:  - Continue therapies  - Nephrology recs appreciated.  IVF trial completed and BROCK resolved on latest BMP.  Will avoid nephrotoxins and continue to monitor.  - DVT prophylaxis  - Skin- Turn q2h, check skin daily  - Continue current medications; patient medically stable.   - Patient is stable to continue current rehabilitation program.   
Denies CP, SOB, N/V    Vital Signs Last 24 Hrs  T(C): 36.8 (21 @ 08:47), Max: 36.8 (21 @ 19:44)  T(F): 98.2 (21 @ 08:47), Max: 98.2 (21 @ 19:44)  HR: 85 (21 @ 08:47) (85 - 89)  BP: 103/65 (21 @ 08:47) (103/65 - 162/84)  RR: 14 (21 @ 08:47) (14 - 16)  SpO2: 100% (21 @ 08:47) (95% - 100%)    s1s2  clear b/l  soft, ND  no edema                        10.9   11.10 )-----------( 410      ( 25 Mar 2021 07:37 )             34.0     26 Mar 2021 12:28    137    |  104    |  59     ----------------------------<  89     4.6     |  26     |  2.99     Ca    8.8        26 Mar 2021 12:28    TPro  5.5    /  Alb  2.6    /  TBili  0.2    /  DBili  x      /  AST  24     /  ALT  33     /  AlkPhos  63     25 Mar 2021 07:37    LIVER FUNCTIONS - ( 25 Mar 2021 07:37 )  Alb: 2.6 g/dL / Pro: 5.5 g/dL / ALK PHOS: 63 U/L / ALT: 33 U/L / AST: 24 U/L / GGT: x           Urinalysis Basic - ( 25 Mar 2021 20:50 )    Color: Yellow / Appearance: Clear / S.010 / pH: x  Gluc: x / Ketone: Negative  / Bili: Negative / Urobili: Negative   Blood: x / Protein: 500 mg/dL / Nitrite: Negative   Leuk Esterase: Negative / RBC: 0-4 /HPF / WBC 0-2 /HPF   Sq Epi: x / Non Sq Epi: Neg.-Few / Bacteria: Negative /HPF    acetaminophen   Tablet .. 650 milliGRAM(s) Oral every 6 hours PRN  aspirin  chewable 81 milliGRAM(s) Oral daily  atorvastatin 80 milliGRAM(s) Oral at bedtime  carvedilol 6.25 milliGRAM(s) Oral every 12 hours  dextrose 40% Gel 15 Gram(s) Oral once  dextrose 5%. 1000 milliLiter(s) IV Continuous <Continuous>  dextrose 5%. 1000 milliLiter(s) IV Continuous <Continuous>  dextrose 50% Injectable 25 Gram(s) IV Push once  dextrose 50% Injectable 12.5 Gram(s) IV Push once  dextrose 50% Injectable 25 Gram(s) IV Push once  fenofibrate Tablet 48 milliGRAM(s) Oral daily  glucagon  Injectable 1 milliGRAM(s) IntraMuscular once  heparin   Injectable 5000 Unit(s) SubCutaneous every 12 hours  insulin glargine Injectable (LANTUS) 18 Unit(s) SubCutaneous every morning  insulin glargine Injectable (LANTUS) 3 Unit(s) SubCutaneous at bedtime  insulin lispro (ADMELOG) corrective regimen sliding scale   SubCutaneous three times a day before meals  insulin lispro (ADMELOG) corrective regimen sliding scale   SubCutaneous at bedtime  insulin lispro Injectable (ADMELOG) 7 Unit(s) SubCutaneous before breakfast  insulin lispro Injectable (ADMELOG) 5 Unit(s) SubCutaneous before lunch  insulin lispro Injectable (ADMELOG) 7 Unit(s) SubCutaneous before dinner  melatonin 3 milliGRAM(s) Oral at bedtime  NIFEdipine XL 90 milliGRAM(s) Oral daily  pantoprazole    Tablet 40 milliGRAM(s) Oral before breakfast  senna 2 Tablet(s) Oral at bedtime PRN  sodium chloride 0.45%. 1000 milliLiter(s) IV Continuous <Continuous>  traZODone 25 milliGRAM(s) Oral at bedtime    A/P:    Appears to have DM CKD 4 w/proteinuria and baseline Cr ~ 2 (Cr 2.13 - 3/20/21)  SONO w/small L kidney, no hydro  Etiology of BROCK not obvious, possibly ATN  Will check BMP in am  Check UA, lytes  Will f/u renal serologies  Avoid nephrotoxins  Trial of [facility]    [phone]                      
NEPHROLOGY PROGRESS NOTE    CHIEF COMPLAINT:  BROCK/CKD    HPI:  Renal function slowly worse as of yesterday.  She is currently on IV fluid.   BP neither low or high.  No nephrotoxic exposures noted.   Bladder scan  mL.      ROS:  denies SOB    EXAM:  T(F): 97.8 (03-30-21 @ 08:55)  HR: 88 (03-30-21 @ 08:55)  BP: 126/69 (03-30-21 @ 08:55)  RR: 14 (03-30-21 @ 08:55)  SpO2: 100% (03-30-21 @ 08:55)    Conversant, in no apparent distress  Normal respiratory effort, lungs clear bilaterally  Heart RRR with no murmur, no peripheral edema         LABS                             9.3    10.58 )-----------( 399      ( 29 Mar 2021 05:00 )             28.2     Eos 6.3-10.4%      ANCA, PEGGY, GBM all negative    C3/C4, SPEP normal             03-29    139  |  107  |  72<H>  ----------------------------<  259<H>  4.2   |  23  |  3.47<H>    Ca    8.4      29 Mar 2021 05:00    TPro  5.9<L>  /  Alb  2.1<L>  /  TBili  0.1<L>  /  DBili  x   /  AST  16  /  ALT  25  /  AlkPhos  56  03-29    URINE 500 protein, trace blood, urine P/C 6.8, urine Eos negative      ASSESSMENT  Inpatient BROCK x 1 week old, etiology not obvious from chart review, only clue may be significant eosinophilia, but no medication noted on MAR that could provoke ATIN;  she was on fenofibrate so should consider rhabdomyolysis  CKD 4 due to DM w/proteinuria and baseline Cr ~ 2 (Cr 2.13 - 3/20/21)  Unilateral small left kidney    RECOMMEND  May discontinue IVF if no improvement in BUN/Cr today  Check CPK  Daily BMP        
No overnight events.      REVIEW OF SYSTEMS  Constitutional - No fever,  No fatigue  Neurological - No headaches, No loss of strength  Musculoskeletal - No joint pain, No joint swelling, No muscle pain    VITALS  T(C): 36.7 (04-04-21 @ 08:35), Max: 37.2 (04-03-21 @ 20:29)  HR: 68 (04-04-21 @ 08:35) (68 - 91)  BP: 126/68 (04-04-21 @ 08:35) (126/68 - 166/76)  RR: 14 (04-04-21 @ 08:35) (14 - 16)  SpO2: 97% (04-04-21 @ 08:35) (95% - 97%)  Wt(kg): --       MEDICATIONS   acetaminophen   Tablet .. 650 milliGRAM(s) every 6 hours PRN  aspirin  chewable 81 milliGRAM(s) daily  atorvastatin 80 milliGRAM(s) at bedtime  carvedilol 12.5 milliGRAM(s) every 12 hours  dextrose 40% Gel 15 Gram(s) once  dextrose 5%. 1000 milliLiter(s) <Continuous>  dextrose 5%. 1000 milliLiter(s) <Continuous>  dextrose 50% Injectable 25 Gram(s) once  dextrose 50% Injectable 12.5 Gram(s) once  dextrose 50% Injectable 25 Gram(s) once  glucagon  Injectable 1 milliGRAM(s) once  heparin   Injectable 5000 Unit(s) every 12 hours  insulin glargine Injectable (LANTUS) 10 Unit(s) every morning  insulin glargine Injectable (LANTUS) 10 Unit(s) at bedtime  insulin lispro (ADMELOG) corrective regimen sliding scale   three times a day before meals  insulin lispro (ADMELOG) corrective regimen sliding scale   at bedtime  insulin lispro Injectable (ADMELOG) 5 Unit(s) three times a day before meals  NIFEdipine XL 90 milliGRAM(s) daily  senna 2 Tablet(s) at bedtime PRN  traZODone 25 milliGRAM(s) <User Schedule>      RECENT LABS/IMAGING    04-04    136  |  106  |  76<H>  ----------------------------<  198<H>  4.1   |  19<L>  |  2.88<H>    Ca    8.3<L>      04 Apr 2021 05:45                  POCT Blood Glucose.: 143 mg/dL (04-04-21 @ 11:47)  POCT Blood Glucose.: 189 mg/dL (04-04-21 @ 07:27)  POCT Blood Glucose.: 168 mg/dL (04-03-21 @ 22:01)  POCT Blood Glucose.: 114 mg/dL (04-03-21 @ 17:03)    ---------    ---  PHYSICAL EXAM  Constitutional - NAD, Comfortable, in bed   Pulm - Breathing comfortably, No wheezing  Abd - Soft, NTND  Extremities - No edema, No calf tenderness  Neurologic Exam -                    Cognitive - Awake, Alert     Communication - Fluent     Motor - left side weakness, facial droop      Sensory - Intact to LT  Psychiatric - Mood WNL, Affect WNL    ASSESSMENT/PLAN  70y Female pmhx of CAD s/p stent 6 years ago, Dm2, ?CKD, HTN with functional deficits after CVA, right internal capsule, right corona radiata, left hemiparesis   ASA, lipitor   HTN uncontrolled, carvedilol increased, continue to monitor   Continue current medical management  Pain - Tylenol PRN  DVT PPX - heparin   Continue 3hrs a day of comprehensive rehab program.         
No overnight events.  feels comfortable    REVIEW OF SYSTEMS  Constitutional - No fever,  No fatigue  Neurological - No headaches, No loss of strength  Musculoskeletal - No joint pain, No joint swelling, No muscle pain    VITALS  T(C): 36.6 (04-03-21 @ 07:23), Max: 36.9 (04-02-21 @ 22:44)  HR: 65 (04-03-21 @ 07:23) (65 - 92)  BP: 122/69 (04-03-21 @ 07:23) (122/69 - 179/91)  RR: 16 (04-03-21 @ 07:23) (16 - 16)  SpO2: 98% (04-03-21 @ 07:23) (98% - 99%)  Wt(kg): --       MEDICATIONS   acetaminophen   Tablet .. 650 milliGRAM(s) every 6 hours PRN  aspirin  chewable 81 milliGRAM(s) daily  atorvastatin 80 milliGRAM(s) at bedtime  carvedilol 25 milliGRAM(s) every 12 hours  dextrose 40% Gel 15 Gram(s) once  dextrose 5%. 1000 milliLiter(s) <Continuous>  dextrose 5%. 1000 milliLiter(s) <Continuous>  dextrose 50% Injectable 25 Gram(s) once  dextrose 50% Injectable 12.5 Gram(s) once  dextrose 50% Injectable 25 Gram(s) once  glucagon  Injectable 1 milliGRAM(s) once  heparin   Injectable 5000 Unit(s) every 12 hours  insulin glargine Injectable (LANTUS) 10 Unit(s) every morning  insulin glargine Injectable (LANTUS) 10 Unit(s) at bedtime  insulin lispro (ADMELOG) corrective regimen sliding scale   three times a day before meals  insulin lispro (ADMELOG) corrective regimen sliding scale   at bedtime  insulin lispro Injectable (ADMELOG) 5 Unit(s) three times a day before meals  NIFEdipine XL 90 milliGRAM(s) daily  senna 2 Tablet(s) at bedtime PRN  traZODone 25 milliGRAM(s) <User Schedule>      RECENT LABS/IMAGING    04-03    142  |  110<H>  |  75<H>  ----------------------------<  86  4.1   |  23  |  2.81<H>    Ca    8.9      03 Apr 2021 05:30                  POCT Blood Glucose.: 218 mg/dL (04-03-21 @ 11:49)  POCT Blood Glucose.: 92 mg/dL (04-03-21 @ 07:34)  POCT Blood Glucose.: 157 mg/dL (04-02-21 @ 22:41)  POCT Blood Glucose.: 97 mg/dL (04-02-21 @ 17:08)    ---------  PHYSICAL EXAM  Constitutional - NAD, Comfortable, in bed   Pulm - Breathing comfortably, No wheezing  Abd - Soft, NTND  Extremities - No edema, No calf tenderness  Neurologic Exam -                    Cognitive - Awake, Alert     Communication - Fluent     Motor - left side weakness, facial droop      Sensory - Intact to LT  Psychiatric - Mood WNL, Affect WNL    ASSESSMENT/PLAN  70y Female pmhx of CAD s/p stent 6 years ago, Dm2, ?CKD, HTN with functional deficits after CVA, right internal capsule, right corona radiata, left hemiparesis   ASA, lipitor   HTN uncontrolled, carvedilol increased today   Continue current medical management  Pain - Tylenol PRN  DVT PPX - heparin   Continue 3hrs a day of comprehensive rehab program.       
Patient is a 70y old  Female who presents with a chief complaint of 01.1 Left Body Involvement (Right Brain) (05 Apr 2021 12:49)    No events overnight  Denies chest pain, SOB  Had a BM yesterday    Patient seen and examined at bedside.    ALLERGIES:  No Known Allergies    MEDICATIONS  (STANDING):  aspirin  chewable 81 milliGRAM(s) Oral daily  atorvastatin 80 milliGRAM(s) Oral at bedtime  carvedilol 12.5 milliGRAM(s) Oral every 12 hours  dextrose 40% Gel 15 Gram(s) Oral once  dextrose 5%. 1000 milliLiter(s) (50 mL/Hr) IV Continuous <Continuous>  dextrose 5%. 1000 milliLiter(s) (100 mL/Hr) IV Continuous <Continuous>  dextrose 50% Injectable 25 Gram(s) IV Push once  dextrose 50% Injectable 12.5 Gram(s) IV Push once  dextrose 50% Injectable 25 Gram(s) IV Push once  glucagon  Injectable 1 milliGRAM(s) IntraMuscular once  heparin   Injectable 5000 Unit(s) SubCutaneous every 12 hours  insulin glargine Injectable (LANTUS) 10 Unit(s) SubCutaneous every morning  insulin glargine Injectable (LANTUS) 10 Unit(s) SubCutaneous at bedtime  insulin lispro (ADMELOG) corrective regimen sliding scale   SubCutaneous three times a day before meals  insulin lispro (ADMELOG) corrective regimen sliding scale   SubCutaneous at bedtime  insulin lispro Injectable (ADMELOG) 5 Unit(s) SubCutaneous three times a day before meals  NIFEdipine XL 90 milliGRAM(s) Oral daily  traZODone 25 milliGRAM(s) Oral <User Schedule>    MEDICATIONS  (PRN):  acetaminophen   Tablet .. 650 milliGRAM(s) Oral every 6 hours PRN Temp greater or equal to 38C (100.4F), Mild Pain (1 - 3)  senna 2 Tablet(s) Oral at bedtime PRN Constipation    Vital Signs Last 24 Hrs  T(F): 97.3 (05 Apr 2021 07:43), Max: 97.5 (04 Apr 2021 20:39)  HR: 86 (05 Apr 2021 07:43) (72 - 87)  BP: 145/72 (05 Apr 2021 07:43) (134/74 - 168/78)  RR: 14 (05 Apr 2021 07:43) (14 - 14)  SpO2: 95% (05 Apr 2021 07:43) (95% - 96%)  I&O's Summary      PHYSICAL EXAM:  General: NAD, A/O x 3  ENT: MMM, no scleral icterus  Neck: Supple, No JVD, no thyroidomegaly  Lungs: Clear to auscultation bilaterally, no wheezes, no rales, no rhonchi, good inspiratory effort  Cardio: RRR, S1/S2, No murmurs  Abdomen: Soft, Nontender, Nondistended; Bowel sounds present  Extremities: No calf tenderness, No pitting edema, no skin changes    LABS:                        9.1    10.41 )-----------( 467      ( 05 Apr 2021 05:30 )             27.6       04-05    141  |  108  |  66  ----------------------------<  123  4.4   |  22  |  2.62    Ca    8.9      05 Apr 2021 05:30    TPro  6.1  /  Alb  2.3  /  TBili  <0.1  /  DBili  x   /  AST  22  /  ALT  28  /  AlkPhos  58  04-05     eGFR if Non African American: 18 mL/min/1.73M2 (04-05-21 @ 05:30)  eGFR if African American: 21 mL/min/1.73M2 (04-05-21 @ 05:30)       03-14 Chol 263 mg/dL LDL -- HDL 34 mg/dL Trig 396 mg/dL    POCT Blood Glucose.: 125 mg/dL (05 Apr 2021 12:02)  POCT Blood Glucose.: 148 mg/dL (05 Apr 2021 07:38)  POCT Blood Glucose.: 144 mg/dL (04 Apr 2021 21:04)  POCT Blood Glucose.: 95 mg/dL (04 Apr 2021 16:53)    COVID-19 PCR: NotDetec (03-30-21 @ 22:00)  COVID-19 PCR: NotDetec (03-19-21 @ 11:43)  COVID-19 PCR: NotDetec (03-14-21 @ 00:37)        RADIOLOGY & ADDITIONAL TESTS:  No new imaging    
Patient is a 70y old  Female who presents with a chief complaint of 01.1 Left Body Involvement (Right Brain) (23 Mar 2021 09:02)    69 y/o woman with pmhx of CAD s/p stent 6 years ago, Dm2, ?CKD, HTN who presents to the ER with 1-2 days of generalized weakness.  Reportedly per her son she had fallen and was slightly confused but with no LOC or head strike. She was found to have slurred speech at some point as well. She denies any headache or vision changes. Last normal per son around 10 PM friday. in the ER pt was given asa, ceftriaxone and 2L NS.   CT head revealed acute cva involving R internal capsule, R corona radiata.   Medically stable for transfer to [facility] on 3/19/2021. Patient seen and examined at bedside, no complaints.  (19 Mar 2021 17:06)      PAST MEDICAL & SURGICAL HISTORY:  Hypertension  DM (diabetes mellitus)  CAD (coronary artery disease)  No significant past surgical history    Allergies  No Known Allergies  Intolerances    TODAY'S SUBJECTIVE & REVIEW OF SYMPTOMS:  Patient seen and examined today, no acute events overnight. Patient states she did not sleep well last night, requests higher dose melatonin. Doing well in therapy, denies headache, chest pain, shortness of breath, nausea, vomiting, diarrhea, or constipation.      PHYSICAL EXAM  Vital Signs Last 24 Hrs  T(C): 36.7 (23 Mar 2021 07:14), Max: 37.1 (22 Mar 2021 20:37)  T(F): 98 (23 Mar 2021 07:14), Max: 98.8 (22 Mar 2021 20:37)  HR: 91 (23 Mar 2021 07:14) (87 - 101)  BP: 133/74 (23 Mar 2021 07:14) (133/74 - 162/85)  BP(mean): --  RR: 15 (23 Mar 2021 07:14) (14 - 15)  SpO2: 100% (23 Mar 2021 07:14) (96% - 100%)      ----------------------------------------------------------------------------------------  PHYSICAL EXAM  Constitutional - NAD, Comfortable  HEENT - NCAT, EOMI  Neck - Supple, No limited ROM  Chest - Breathing comfortably, No wheezing  Cardiovascular - S1S2   Abdomen - Soft   Extremities - No C/C/E, No calf tenderness   Neurologic Exam -                    Cognitive - Awake, Alert, AAO to self, place, date, year, situation     Communication - Fluent, No dysarthria     Cranial Nerves - slight left facial droop     Motor - No focal deficits                    LEFT    UE - ShAB 4/5, EF 4/5, EE 4/5, WE 4/5,  4/5                    RIGHT UE - ShAB 5/5, EF 5/5, EE 5/5, WE 5/5,  5/5                    LEFT    LE - HF 4/5, KE 4/5, DF 4/5, PF 4/5                    RIGHT LE - HF 5/5, KE 5/5, DF 5/5, PF 5/5        Sensory - Intact to LT     Reflexes - DTR Intact     Coordination - FTN intact on right; slight difficulty on left     OculoVestibular - No saccades, No nystagmus, VOR      Psychiatric - Mood stable, Affect WNL        RECENT LABS:                          10.3   11.61 )-----------( 350      ( 22 Mar 2021 05:45 )             31.5     03-22    138  |  103  |  52<H>  ----------------------------<  189<H>  4.3   |  26  |  2.80<H>    Ca    8.2<L>      22 Mar 2021 05:45    TPro  5.9<L>  /  Alb  2.1<L>  /  TBili  0.2  /  DBili  x   /  AST  24  /  ALT  44  /  AlkPhos  58  03-22    LIVER FUNCTIONS - ( 22 Mar 2021 05:45 )  Alb: 2.1 g/dL / Pro: 5.9 g/dL / ALK PHOS: 58 U/L / ALT: 44 U/L / AST: 24 U/L / GGT: x             CAPILLARY BLOOD GLUCOSE  POCT Blood Glucose.: 159 mg/dL (23 Mar 2021 11:33)  POCT Blood Glucose.: 140 mg/dL (23 Mar 2021 07:55)  POCT Blood Glucose.: 217 mg/dL (22 Mar 2021 21:12)  POCT Blood Glucose.: 91 mg/dL (22 Mar 2021 16:43)  POCT Blood Glucose.: 120 mg/dL (22 Mar 2021 12:14)      MEDICATIONS  (STANDING):  aspirin  chewable 81 milliGRAM(s) Oral daily  atorvastatin 80 milliGRAM(s) Oral at bedtime  carvedilol 6.25 milliGRAM(s) Oral every 12 hours  dextrose 40% Gel 15 Gram(s) Oral once  dextrose 5%. 1000 milliLiter(s) (50 mL/Hr) IV Continuous <Continuous>  dextrose 5%. 1000 milliLiter(s) (100 mL/Hr) IV Continuous <Continuous>  dextrose 50% Injectable 25 Gram(s) IV Push once  dextrose 50% Injectable 12.5 Gram(s) IV Push once  dextrose 50% Injectable 25 Gram(s) IV Push once  fenofibrate Tablet 48 milliGRAM(s) Oral daily  glucagon  Injectable 1 milliGRAM(s) IntraMuscular once  heparin   Injectable 5000 Unit(s) SubCutaneous every 12 hours  insulin glargine Injectable (LANTUS) 18 Unit(s) SubCutaneous every morning  insulin lispro (ADMELOG) corrective regimen sliding scale   SubCutaneous three times a day before meals  insulin lispro (ADMELOG) corrective regimen sliding scale   SubCutaneous at bedtime  insulin lispro Injectable (ADMELOG) 7 Unit(s) SubCutaneous three times a day before meals  melatonin 3 milliGRAM(s) Oral at bedtime  NIFEdipine XL 90 milliGRAM(s) Oral daily  pantoprazole    Tablet 40 milliGRAM(s) Oral before breakfast    MEDICATIONS  (PRN):  acetaminophen   Tablet .. 650 milliGRAM(s) Oral every 6 hours PRN Temp greater or equal to 38C (100.4F), Mild Pain (1 - 3)  senna 2 Tablet(s) Oral at bedtime PRN Constipation      IMPRESSION AND PLAN:  69 y/o woman with pmhx of CAD s/p stent 6 years ago, Dm2, ?CKD, HTN, found to have R internal capsule, R corona radiata. Now with left hemiparesis, dysphasia.    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- will need outpatient follow up  - Avoid nephrotoxic agents  - Monitor BMP- Consider renal consult if worsens    #DM A1C 14  - Continue with Lantus 18U  - CAMRON - 7U pre-prandial  - Holding home oral DM regimen- hold metformin for current GFR  - Unable to start ACEI/ARB for proteinuria due to BROCK/CKD   - Diabetes Education from Diabetes Nurse Practitioner- patient declined learning to do her own fingersticks-  - If BG readings remain in current range- Discharge on Lantus or equivalent substitute  18 units every morning, Admelog or equivalent substitute 7 units before each meal.  FU with Endocrinology upon discharge    #Pain control  - Tylenol PRN    #Insomnia  - increase melatonin from 3mg to 6mg     #GI/Bowel Mgmt   - Continent c/w Senna    #Bladder management  - Continent,     #DVT  - Heparin  - SCDs, TEDs     #Skin:  -No active issues at this time    FEN   - Diet - Dysphagia 2 Mechanical Soft-Thin Liquids; Consistent Carb/DASH    - Dysphagia  SLP - evaluation and treatment    Precautions / PROPHYLAXIS:   - Falls, Cardiac  - ortho: Weight bearing status: WBAT   - Lungs: Aspiration, Incentive Spirometer   - Pressure injury/Skin: Turn Q2hrs while in bed, OOB to Chair, PT/OT/SLP    Dispo: IDT Round 3/23/21  - Lives in private home with , 4 TRESSA and 10 steps inside  - Supervision for eating, grooming, Min A for lower body dressing and toilet transfers  - Goal for Mod I for self care and supervision for shower transfers  - SLP - reduced awareness deficits, mild deficits comprehension  - Tentative DC date 4/2/21 Home with Home Care    Followups:  Libman, Richard B (MD)  Neurology; Vascular Neurology  [address] [facility], [address]  [place] [address]  Phone: [phone]  Fax: [phone]  Follow Up Time:     Lillian Koch)  EndocrinologyMetabDiabetes  [address] [place] [id number]  Phone: [phone]  Fax: [phone]      
Patient is a 70y old  Female who presents with a chief complaint of 01.1 Left Body Involvement (Right Brain) (23 Mar 2021 09:02)    69 y/o woman with pmhx of CAD s/p stent 6 years ago, Dm2, ?CKD, HTN who presents to the ER with 1-2 days of generalized weakness.  Reportedly per her son she had fallen and was slightly confused but with no LOC or head strike. She was found to have slurred speech at some point as well. She denies any headache or vision changes. Last normal per son around 10 PM friday. in the ER pt was given asa, ceftriaxone and 2L NS.   CT head revealed acute cva involving R internal capsule, R corona radiata.   Medically stable for transfer to [facility] on 3/19/2021. Patient seen and examined at bedside, no complaints.  (19 Mar 2021 17:06)      PAST MEDICAL & SURGICAL HISTORY:  Hypertension  DM (diabetes mellitus)  CAD (coronary artery disease)  No significant past surgical history    Allergies  No Known Allergies  Intolerances    TODAY'S SUBJECTIVE & REVIEW OF SYMPTOMS:  Patient seen and evaluated this morning. No acute events overnight. Participating well in therapy. Pain is well controlled. Denies chest pain, SOB, nausea, vomiting, constipation or diarrhea. Slept very well last night.    Vital Signs Last 24 Hrs  Vital Signs Last 24 Hrs  T(C): 36.7 (02 Apr 2021 07:18), Max: 37.3 (01 Apr 2021 19:47)  T(F): 98 (02 Apr 2021 07:18), Max: 99.2 (01 Apr 2021 19:47)  HR: 73 (02 Apr 2021 07:18) (73 - 89)  BP: 111/64 (02 Apr 2021 07:18) (111/64 - 182/79)  BP(mean): --  RR: 14 (02 Apr 2021 07:18) (14 - 16)  SpO2: 97% (02 Apr 2021 07:18) (97% - 99%)  ----------------------------------------------------------------------------------------  PHYSICAL EXAM  Constitutional - NAD, Comfortable  HEENT - NCAT, EOMI  Neck - Supple, No limited ROM  Chest - Breathing comfortably, No wheezing  Cardiovascular - S1S2   Abdomen - Soft   Extremities - No C/C/E, No calf tenderness   Neurologic Exam -                    Cognitive - Awake, Alert, AAO to self, place, date, year, situation     Communication - Fluent, No dysarthria     Cranial Nerves - slight left facial droop     Motor - No focal deficits                    LEFT    UE - ShAB 4/5, EF 4/5, EE 4/5, WE 4/5,  4/5                    RIGHT UE - ShAB 5/5, EF 5/5, EE 5/5, WE 5/5,  5/5                    LEFT    LE - HF 4/5, KE 4/5, DF 4/5, PF 4/5                    RIGHT LE - HF 5/5, KE 5/5, DF 5/5, PF 5/5        Sensory - Intact to LT     Reflexes - DTR Intact     Coordination - FTN intact on right; slight difficulty on left     OculoVestibular - No saccades, No nystagmus, VOR      Psychiatric - Mood stable, Affect WNL                                   10.2   10.10 )-----------( 458      ( 01 Apr 2021 05:00 )             31.3     04-02    140  |  107  |  81<H>  ----------------------------<  107<H>  4.4   |  21<L>  |  3.09<H>    Ca    8.5      02 Apr 2021 05:00    TPro  6.4  /  Alb  2.4<L>  /  TBili  0.2  /  DBili  x   /  AST  28  /  ALT  35  /  AlkPhos  59  04-01              MEDICATIONS  (STANDING):  aspirin  chewable 81 milliGRAM(s) Oral daily  atorvastatin 80 milliGRAM(s) Oral at bedtime  carvedilol 6.25 milliGRAM(s) Oral every 12 hours  dextrose 40% Gel 15 Gram(s) Oral once  dextrose 5%. 1000 milliLiter(s) (50 mL/Hr) IV Continuous <Continuous>  dextrose 5%. 1000 milliLiter(s) (100 mL/Hr) IV Continuous <Continuous>  dextrose 50% Injectable 25 Gram(s) IV Push once  dextrose 50% Injectable 12.5 Gram(s) IV Push once  dextrose 50% Injectable 25 Gram(s) IV Push once  fenofibrate Tablet 48 milliGRAM(s) Oral daily  glucagon  Injectable 1 milliGRAM(s) IntraMuscular once  heparin   Injectable 5000 Unit(s) SubCutaneous every 12 hours  insulin glargine Injectable (LANTUS) 18 Unit(s) SubCutaneous every morning  insulin glargine Injectable (LANTUS) 3 Unit(s) SubCutaneous at bedtime  insulin lispro (ADMELOG) corrective regimen sliding scale   SubCutaneous three times a day before meals  insulin lispro (ADMELOG) corrective regimen sliding scale   SubCutaneous at bedtime  insulin lispro Injectable (ADMELOG) 7 Unit(s) SubCutaneous before breakfast  insulin lispro Injectable (ADMELOG) 5 Unit(s) SubCutaneous before lunch  insulin lispro Injectable (ADMELOG) 7 Unit(s) SubCutaneous before dinner  NIFEdipine XL 90 milliGRAM(s) Oral daily  pantoprazole    Tablet 40 milliGRAM(s) Oral before breakfast    MEDICATIONS  (PRN):  acetaminophen   Tablet .. 650 milliGRAM(s) Oral every 6 hours PRN Temp greater or equal to 38C (100.4F), Mild Pain (1 - 3)  melatonin 9 milliGRAM(s) Oral at bedtime PRN Insomnia  senna 2 Tablet(s) Oral at bedtime PRN Constipation        IMPRESSION AND PLAN:  69 y/o woman with pmhx of CAD s/p stent 6 years ago, Dm2, ?CKD, HTN, found to have R internal capsule, R corona radiata. Now with left hemiparesis, dysphasia.    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- will need outpatient follow up  - Avoid nephrotoxic agents  - Monitor BMP- Consider renal consult if worsens  - CR 2.9 (3/24), renal consult, PVR check Q6h    #DM A1C 14  - Continue with Lantus 18U  - CAMRON - 7U pre-prandial  - Holding home oral DM regimen- hold metformin for current GFR  - Unable to start ACEI/ARB for proteinuria due to BROCK/CKD   - Diabetes Education from Diabetes Nurse Practitioner- patient declined learning to do her own fingersticks-  - If BG readings remain in current range- Discharge on Lantus or equivalent substitute  18 units every morning, Admelog or equivalent substitute 7 units before each meal.  FU with Endocrinology upon discharge    #Pain control  - Tylenol PRN    #Insomnia  - increase melatonin from 3mg to 6mg and then 9 mg. Didn't help sleep. Plan to changed melatonin to 3 mg and start trazodone 25 mg (3/26)    #GI/Bowel Mgmt   - Continent c/w Senna    #Bladder management  - Continent,     #DVT  - Heparin  - SCDs, TEDs     #Skin:  -No active issues at this time    FEN   - Diet - Dysphagia 2 Mechanical Soft-Thin Liquids; Consistent Carb/DASH    - Dysphagia  SLP - evaluation and treatment    Precautions / PROPHYLAXIS:   - Falls, Cardiac  - ortho: Weight bearing status: WBAT   - Lungs: Aspiration, Incentive Spirometer   - Pressure injury/Skin: Turn Q2hrs while in bed, OOB to Chair, PT/OT/SLP    Dispo: IDT Round 3/23/21  - Lives in private home with , 4 TRESSA and 10 steps inside  - Supervision for eating, grooming, Min A for lower body dressing and toilet transfers  - Goal for Mod I for self care and supervision for shower transfers  - SLP - reduced awareness deficits, mild deficits comprehension  - Tentative DC date 4/2/21 Home with Home Care    Followups:  Libman, Richard B (MD)  Neurology; Vascular Neurology  [address] [facility], [address]  [place] [address]  Phone: [phone]  Fax: [phone]  Follow Up Time:     Lillian Koch)  EndocrinologyMetabDiabetes  [address] [place] [address]  Phone: [phone]  Fax: [phone]      
Patient is a 70y old  Female who presents with a chief complaint of 01.1 Left Body Involvement (Right Brain) (23 Mar 2021 09:02)    69 y/o woman with pmhx of CAD s/p stent 6 years ago, Dm2, ?CKD, HTN who presents to the ER with 1-2 days of generalized weakness.  Reportedly per her son she had fallen and was slightly confused but with no LOC or head strike. She was found to have slurred speech at some point as well. She denies any headache or vision changes. Last normal per son around 10 PM friday. in the ER pt was given asa, ceftriaxone and 2L NS.   CT head revealed acute cva involving R internal capsule, R corona radiata.   Medically stable for transfer to [facility] on 3/19/2021. Patient seen and examined at bedside, no complaints.  (19 Mar 2021 17:06)      PAST MEDICAL & SURGICAL HISTORY:  Hypertension  DM (diabetes mellitus)  CAD (coronary artery disease)  No significant past surgical history    Allergies  No Known Allergies  Intolerances    TODAY'S SUBJECTIVE & REVIEW OF SYMPTOMS:  Patient seen and evaluated this morning. No acute events overnight. Participating well in therapy. Pain is well controlled. Denies chest pain, SOB, nausea, vomiting, constipation or diarrhea. Didn't sleep well last night, tired this morning, on IVF    PHYSICAL EXAM Vital Signs Last 24 Hrs   Vital Signs Last 24 Hrs  T(C): 36.6 (28 Mar 2021 20:35), Max: 36.6 (28 Mar 2021 20:35)  T(F): 97.8 (28 Mar 2021 20:35), Max: 97.8 (28 Mar 2021 20:35)  HR: 97 (29 Mar 2021 06:00) (92 - 97)  BP: 159/87 (29 Mar 2021 06:00) (144/80 - 159/87)  BP(mean): --  RR: 15 (28 Mar 2021 20:35) (15 - 15)  SpO2: 98% (28 Mar 2021 20:35) (98% - 98%)  ----------------------------------------------------------------------------------------  PHYSICAL EXAM  Constitutional - NAD, Comfortable  HEENT - NCAT, EOMI  Neck - Supple, No limited ROM  Chest - Breathing comfortably, No wheezing  Cardiovascular - S1S2   Abdomen - Soft   Extremities - No C/C/E, No calf tenderness   Neurologic Exam -                    Cognitive - Awake, Alert, AAO to self, place, date, year, situation     Communication - Fluent, No dysarthria     Cranial Nerves - slight left facial droop     Motor - No focal deficits                    LEFT    UE - ShAB 4/5, EF 4/5, EE 4/5, WE 4/5,  4/5                    RIGHT UE - ShAB 5/5, EF 5/5, EE 5/5, WE 5/5,  5/5                    LEFT    LE - HF 4/5, KE 4/5, DF 4/5, PF 4/5                    RIGHT LE - HF 5/5, KE 5/5, DF 5/5, PF 5/5        Sensory - Intact to LT     Reflexes - DTR Intact     Coordination - FTN intact on right; slight difficulty on left     OculoVestibular - No saccades, No nystagmus, VOR      Psychiatric - Mood stable, Affect WNL        RECENT LABS:                        9.3    10.58 )-----------( 399      ( 29 Mar 2021 05:00 )             28.2     03-29    139  |  107  |  72<H>  ----------------------------<  259<H>  4.2   |  23  |  3.47<H>    Ca    8.4      29 Mar 2021 05:00    TPro  5.9<L>  /  Alb  2.1<L>  /  TBili  0.1<L>  /  DBili  x   /  AST  16  /  ALT  25  /  AlkPhos  56  03-29                MEDICATIONS  (STANDING):  aspirin  chewable 81 milliGRAM(s) Oral daily  atorvastatin 80 milliGRAM(s) Oral at bedtime  carvedilol 6.25 milliGRAM(s) Oral every 12 hours  dextrose 40% Gel 15 Gram(s) Oral once  dextrose 5%. 1000 milliLiter(s) (50 mL/Hr) IV Continuous <Continuous>  dextrose 5%. 1000 milliLiter(s) (100 mL/Hr) IV Continuous <Continuous>  dextrose 50% Injectable 25 Gram(s) IV Push once  dextrose 50% Injectable 12.5 Gram(s) IV Push once  dextrose 50% Injectable 25 Gram(s) IV Push once  fenofibrate Tablet 48 milliGRAM(s) Oral daily  glucagon  Injectable 1 milliGRAM(s) IntraMuscular once  heparin   Injectable 5000 Unit(s) SubCutaneous every 12 hours  insulin glargine Injectable (LANTUS) 18 Unit(s) SubCutaneous every morning  insulin glargine Injectable (LANTUS) 3 Unit(s) SubCutaneous at bedtime  insulin lispro (ADMELOG) corrective regimen sliding scale   SubCutaneous three times a day before meals  insulin lispro (ADMELOG) corrective regimen sliding scale   SubCutaneous at bedtime  insulin lispro Injectable (ADMELOG) 7 Unit(s) SubCutaneous before breakfast  insulin lispro Injectable (ADMELOG) 5 Unit(s) SubCutaneous before lunch  insulin lispro Injectable (ADMELOG) 7 Unit(s) SubCutaneous before dinner  NIFEdipine XL 90 milliGRAM(s) Oral daily  pantoprazole    Tablet 40 milliGRAM(s) Oral before breakfast    MEDICATIONS  (PRN):  acetaminophen   Tablet .. 650 milliGRAM(s) Oral every 6 hours PRN Temp greater or equal to 38C (100.4F), Mild Pain (1 - 3)  melatonin 9 milliGRAM(s) Oral at bedtime PRN Insomnia  senna 2 Tablet(s) Oral at bedtime PRN Constipation        IMPRESSION AND PLAN:  69 y/o woman with pmhx of CAD s/p stent 6 years ago, Dm2, ?CKD, HTN, found to have R internal capsule, R corona radiata. Now with left hemiparesis, dysphasia.    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- will need outpatient follow up  - Avoid nephrotoxic agents  - Monitor BMP- Consider renal consult if worsens  - CR 2.9 (3/24), renal consult, PVR check Q6h    #DM A1C 14  - Continue with Lantus 18U  - CAMRON - 7U pre-prandial  - Holding home oral DM regimen- hold metformin for current GFR  - Unable to start ACEI/ARB for proteinuria due to BROCK/CKD   - Diabetes Education from Diabetes Nurse Practitioner- patient declined learning to do her own fingersticks-  - If BG readings remain in current range- Discharge on Lantus or equivalent substitute  18 units every morning, Admelog or equivalent substitute 7 units before each meal.  FU with Endocrinology upon discharge    #Pain control  - Tylenol PRN    #Insomnia  - increase melatonin from 3mg to 6mg and then 9 mg. Didn't help sleep. Plan to changed melatonin to 3 mg and start trazodone 25 mg (3/26)    #GI/Bowel Mgmt   - Continent c/w Senna    #Bladder management  - Continent,     #DVT  - Heparin  - SCDs, TEDs     #Skin:  -No active issues at this time    FEN   - Diet - Dysphagia 2 Mechanical Soft-Thin Liquids; Consistent Carb/DASH    - Dysphagia  SLP - evaluation and treatment    Precautions / PROPHYLAXIS:   - Falls, Cardiac  - ortho: Weight bearing status: WBAT   - Lungs: Aspiration, Incentive Spirometer   - Pressure injury/Skin: Turn Q2hrs while in bed, OOB to Chair, PT/OT/SLP    Dispo: IDT Round 3/23/21  - Lives in private home with , 4 TRESSA and 10 steps inside  - Supervision for eating, grooming, Min A for lower body dressing and toilet transfers  - Goal for Mod I for self care and supervision for shower transfers  - SLP - reduced awareness deficits, mild deficits comprehension  - Tentative DC date 4/2/21 Home with Home Care    Followups:  Libman, Richard B (MD)  Neurology; Vascular Neurology  [address], [address]  [place] [address]  Phone: [phone]  Fax: [phone]  Follow Up Time:     Lillian Koch)  EndocrinologyMetabDiabetes  [address] [place] [address]  Phone: [phone]  Fax: [phone]      
Patient is a 70y old  Female who presents with a chief complaint of 01.1 Left Body Involvement (Right Brain) (23 Mar 2021 09:02)    69 y/o woman with pmhx of CAD s/p stent 6 years ago, Dm2, ?CKD, HTN who presents to the ER with 1-2 days of generalized weakness.  Reportedly per her son she had fallen and was slightly confused but with no LOC or head strike. She was found to have slurred speech at some point as well. She denies any headache or vision changes. Last normal per son around 10 PM friday. in the ER pt was given asa, ceftriaxone and 2L NS.   CT head revealed acute cva involving R internal capsule, R corona radiata.   Medically stable for transfer to [facility] on 3/19/2021. Patient seen and examined at bedside, no complaints.  (19 Mar 2021 17:06)      PAST MEDICAL & SURGICAL HISTORY:  Hypertension  DM (diabetes mellitus)  CAD (coronary artery disease)  No significant past surgical history    Allergies  No Known Allergies  Intolerances    TODAY'S SUBJECTIVE & REVIEW OF SYMPTOMS:  Patient seen and evaluated this morning. No acute events overnight. Participating well in therapy. Pain is well controlled. Denies chest pain, SOB, nausea, vomiting, constipation or diarrhea. Slept very well last night.    Vital Signs Last 24 Hrs  T(C): 36.5 (01 Apr 2021 10:13), Max: 36.5 (01 Apr 2021 10:13)  T(F): 97.7 (01 Apr 2021 10:13), Max: 97.7 (01 Apr 2021 10:13)  HR: 85 (01 Apr 2021 10:13) (85 - 88)  BP: 130/68 (01 Apr 2021 10:13) (130/68 - 176/81)  BP(mean): --  RR: 16 (01 Apr 2021 10:13) (16 - 16)  SpO2: 99% (01 Apr 2021 10:13) (99% - 99%)  ----------------------------------------------------------------------------------------  PHYSICAL EXAM  Constitutional - NAD, Comfortable  HEENT - NCAT, EOMI  Neck - Supple, No limited ROM  Chest - Breathing comfortably, No wheezing  Cardiovascular - S1S2   Abdomen - Soft   Extremities - No C/C/E, No calf tenderness   Neurologic Exam -                    Cognitive - Awake, Alert, AAO to self, place, date, year, situation     Communication - Fluent, No dysarthria     Cranial Nerves - slight left facial droop     Motor - No focal deficits                    LEFT    UE - ShAB 4/5, EF 4/5, EE 4/5, WE 4/5,  4/5                    RIGHT UE - ShAB 5/5, EF 5/5, EE 5/5, WE 5/5,  5/5                    LEFT    LE - HF 4/5, KE 4/5, DF 4/5, PF 4/5                    RIGHT LE - HF 5/5, KE 5/5, DF 5/5, PF 5/5        Sensory - Intact to LT     Reflexes - DTR Intact     Coordination - FTN intact on right; slight difficulty on left     OculoVestibular - No saccades, No nystagmus, VOR      Psychiatric - Mood stable, Affect WNL                         10.2   10.10 )-----------( 458      ( 01 Apr 2021 05:00 )             31.3     04-01    140  |  105  |  73<H>  ----------------------------<  161<H>  4.0   |  21<L>  |  2.90<H>    Ca    8.7      01 Apr 2021 05:00    TPro  6.4  /  Alb  2.4<L>  /  TBili  0.2  /  DBili  x   /  AST  28  /  ALT  35  /  AlkPhos  59  04-01                  MEDICATIONS  (STANDING):  aspirin  chewable 81 milliGRAM(s) Oral daily  atorvastatin 80 milliGRAM(s) Oral at bedtime  carvedilol 6.25 milliGRAM(s) Oral every 12 hours  dextrose 40% Gel 15 Gram(s) Oral once  dextrose 5%. 1000 milliLiter(s) (50 mL/Hr) IV Continuous <Continuous>  dextrose 5%. 1000 milliLiter(s) (100 mL/Hr) IV Continuous <Continuous>  dextrose 50% Injectable 25 Gram(s) IV Push once  dextrose 50% Injectable 12.5 Gram(s) IV Push once  dextrose 50% Injectable 25 Gram(s) IV Push once  fenofibrate Tablet 48 milliGRAM(s) Oral daily  glucagon  Injectable 1 milliGRAM(s) IntraMuscular once  heparin   Injectable 5000 Unit(s) SubCutaneous every 12 hours  insulin glargine Injectable (LANTUS) 18 Unit(s) SubCutaneous every morning  insulin glargine Injectable (LANTUS) 3 Unit(s) SubCutaneous at bedtime  insulin lispro (ADMELOG) corrective regimen sliding scale   SubCutaneous three times a day before meals  insulin lispro (ADMELOG) corrective regimen sliding scale   SubCutaneous at bedtime  insulin lispro Injectable (ADMELOG) 7 Unit(s) SubCutaneous before breakfast  insulin lispro Injectable (ADMELOG) 5 Unit(s) SubCutaneous before lunch  insulin lispro Injectable (ADMELOG) 7 Unit(s) SubCutaneous before dinner  NIFEdipine XL 90 milliGRAM(s) Oral daily  pantoprazole    Tablet 40 milliGRAM(s) Oral before breakfast    MEDICATIONS  (PRN):  acetaminophen   Tablet .. 650 milliGRAM(s) Oral every 6 hours PRN Temp greater or equal to 38C (100.4F), Mild Pain (1 - 3)  melatonin 9 milliGRAM(s) Oral at bedtime PRN Insomnia  senna 2 Tablet(s) Oral at bedtime PRN Constipation        IMPRESSION AND PLAN:  69 y/o woman with pmhx of CAD s/p stent 6 years ago, Dm2, ?CKD, HTN, found to have R internal capsule, R corona radiata. Now with left hemiparesis, dysphasia.    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- will need outpatient follow up  - Avoid nephrotoxic agents  - Monitor BMP- Consider renal consult if worsens  - CR 2.9 (3/24), renal consult, PVR check Q6h    #DM A1C 14  - Continue with Lantus 18U  - CAMRON - 7U pre-prandial  - Holding home oral DM regimen- hold metformin for current GFR  - Unable to start ACEI/ARB for proteinuria due to BROCK/CKD   - Diabetes Education from Diabetes Nurse Practitioner- patient declined learning to do her own fingersticks-  - If BG readings remain in current range- Discharge on Lantus or equivalent substitute  18 units every morning, Admelog or equivalent substitute 7 units before each meal.  FU with Endocrinology upon discharge    #Pain control  - Tylenol PRN    #Insomnia  - increase melatonin from 3mg to 6mg and then 9 mg. Didn't help sleep. Plan to changed melatonin to 3 mg and start trazodone 25 mg (3/26)    #GI/Bowel Mgmt   - Continent c/w Senna    #Bladder management  - Continent,     #DVT  - Heparin  - SCDs, TEDs     #Skin:  -No active issues at this time    FEN   - Diet - Dysphagia 2 Mechanical Soft-Thin Liquids; Consistent Carb/DASH    - Dysphagia  SLP - evaluation and treatment    Precautions / PROPHYLAXIS:   - Falls, Cardiac  - ortho: Weight bearing status: WBAT   - Lungs: Aspiration, Incentive Spirometer   - Pressure injury/Skin: Turn Q2hrs while in bed, OOB to Chair, PT/OT/SLP    Dispo: IDT Round 3/23/21  - Lives in private home with , 4 TRESSA and 10 steps inside  - Supervision for eating, grooming, Min A for lower body dressing and toilet transfers  - Goal for Mod I for self care and supervision for shower transfers  - SLP - reduced awareness deficits, mild deficits comprehension  - Tentative DC date 4/2/21 Home with Home Care    Followups:  Libman, Richard B (MD)  Neurology; Vascular Neurology  [address] [facility], [address]  [place] [address]  Phone: [phone]  Fax: [phone]  Follow Up Time:     Lillian Koch)  EndocrinologyMetabDiabetes  [address] [place] [address]  Phone: [phone]  Fax: [phone]      
Patient is a 70y old  Female who presents with a chief complaint of 01.1 Left Body Involvement (Right Brain) (23 Mar 2021 09:02)    71 y/o woman with pmhx of CAD s/p stent 6 years ago, Dm2, ?CKD, HTN who presents to the ER with 1-2 days of generalized weakness.  Reportedly per her son she had fallen and was slightly confused but with no LOC or head strike. She was found to have slurred speech at some point as well. She denies any headache or vision changes. Last normal per son around 10 PM friday. in the ER pt was given asa, ceftriaxone and 2L NS.   CT head revealed acute cva involving R internal capsule, R corona radiata.   Medically stable for transfer to [facility] on 3/19/2021. Patient seen and examined at bedside, no complaints.  (19 Mar 2021 17:06)      PAST MEDICAL & SURGICAL HISTORY:  Hypertension  DM (diabetes mellitus)  CAD (coronary artery disease)  No significant past surgical history    Allergies  No Known Allergies  Intolerances    TODAY'S SUBJECTIVE & REVIEW OF SYMPTOMS:  Patient seen and evaluated this morning. No acute events overnight. Participating well in therapy. Pain is well controlled. Denies chest pain, SOB, nausea, vomiting, constipation or diarrhea. Slept very well last night.     PHYSICAL EXAM Vital Signs Last 24 Hrs Vital Signs Last 24 Hrs  T(C): 36.6 (31 Mar 2021 07:47), Max: 36.6 (31 Mar 2021 07:47)  T(F): 97.8 (31 Mar 2021 07:47), Max: 97.8 (31 Mar 2021 07:47)  HR: 85 (31 Mar 2021 07:47) (78 - 88)  BP: 156/70 (31 Mar 2021 07:47) (155/71 - 178/96)  BP(mean): --  RR: 15 (31 Mar 2021 07:47) (14 - 15)  SpO2: 99% (31 Mar 2021 07:47) (98% - 99%)  ----------------------------------------------------------------------------------------  PHYSICAL EXAM  Constitutional - NAD, Comfortable  HEENT - NCAT, EOMI  Neck - Supple, No limited ROM  Chest - Breathing comfortably, No wheezing  Cardiovascular - S1S2   Abdomen - Soft   Extremities - No C/C/E, No calf tenderness   Neurologic Exam -                    Cognitive - Awake, Alert, AAO to self, place, date, year, situation     Communication - Fluent, No dysarthria     Cranial Nerves - slight left facial droop     Motor - No focal deficits                    LEFT    UE - ShAB 4/5, EF 4/5, EE 4/5, WE 4/5,  4/5                    RIGHT UE - ShAB 5/5, EF 5/5, EE 5/5, WE 5/5,  5/5                    LEFT    LE - HF 4/5, KE 4/5, DF 4/5, PF 4/5                    RIGHT LE - HF 5/5, KE 5/5, DF 5/5, PF 5/5        Sensory - Intact to LT     Reflexes - DTR Intact     Coordination - FTN intact on right; slight difficulty on left     OculoVestibular - No saccades, No nystagmus, VOR      Psychiatric - Mood stable, Affect WNL       03-31    142  |  108  |  66<H>  ----------------------------<  115<H>  4.1   |  22  |  2.98<H>    Ca    8.6      31 Mar 2021 05:00                        MEDICATIONS  (STANDING):  aspirin  chewable 81 milliGRAM(s) Oral daily  atorvastatin 80 milliGRAM(s) Oral at bedtime  carvedilol 6.25 milliGRAM(s) Oral every 12 hours  dextrose 40% Gel 15 Gram(s) Oral once  dextrose 5%. 1000 milliLiter(s) (50 mL/Hr) IV Continuous <Continuous>  dextrose 5%. 1000 milliLiter(s) (100 mL/Hr) IV Continuous <Continuous>  dextrose 50% Injectable 25 Gram(s) IV Push once  dextrose 50% Injectable 12.5 Gram(s) IV Push once  dextrose 50% Injectable 25 Gram(s) IV Push once  fenofibrate Tablet 48 milliGRAM(s) Oral daily  glucagon  Injectable 1 milliGRAM(s) IntraMuscular once  heparin   Injectable 5000 Unit(s) SubCutaneous every 12 hours  insulin glargine Injectable (LANTUS) 18 Unit(s) SubCutaneous every morning  insulin glargine Injectable (LANTUS) 3 Unit(s) SubCutaneous at bedtime  insulin lispro (ADMELOG) corrective regimen sliding scale   SubCutaneous three times a day before meals  insulin lispro (ADMELOG) corrective regimen sliding scale   SubCutaneous at bedtime  insulin lispro Injectable (ADMELOG) 7 Unit(s) SubCutaneous before breakfast  insulin lispro Injectable (ADMELOG) 5 Unit(s) SubCutaneous before lunch  insulin lispro Injectable (ADMELOG) 7 Unit(s) SubCutaneous before dinner  NIFEdipine XL 90 milliGRAM(s) Oral daily  pantoprazole    Tablet 40 milliGRAM(s) Oral before breakfast    MEDICATIONS  (PRN):  acetaminophen   Tablet .. 650 milliGRAM(s) Oral every 6 hours PRN Temp greater or equal to 38C (100.4F), Mild Pain (1 - 3)  melatonin 9 milliGRAM(s) Oral at bedtime PRN Insomnia  senna 2 Tablet(s) Oral at bedtime PRN Constipation        IMPRESSION AND PLAN:  71 y/o woman with pmhx of CAD s/p stent 6 years ago, Dm2, ?CKD, HTN, found to have R internal capsule, R corona radiata. Now with left hemiparesis, dysphasia.    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- will need outpatient follow up  - Avoid nephrotoxic agents  - Monitor BMP- Consider renal consult if worsens  - CR 2.9 (3/24), renal consult, PVR check Q6h    #DM A1C 14  - Continue with Lantus 18U  - CAMRON - 7U pre-prandial  - Holding home oral DM regimen- hold metformin for current GFR  - Unable to start ACEI/ARB for proteinuria due to BROCK/CKD   - Diabetes Education from Diabetes Nurse Practitioner- patient declined learning to do her own fingersticks-  - If BG readings remain in current range- Discharge on Lantus or equivalent substitute  18 units every morning, Admelog or equivalent substitute 7 units before each meal.  FU with Endocrinology upon discharge    #Pain control  - Tylenol PRN    #Insomnia  - increase melatonin from 3mg to 6mg and then 9 mg. Didn't help sleep. Plan to changed melatonin to 3 mg and start trazodone 25 mg (3/26)    #GI/Bowel Mgmt   - Continent c/w Senna    #Bladder management  - Continent,     #DVT  - Heparin  - SCDs, TEDs     #Skin:  -No active issues at this time    FEN   - Diet - Dysphagia 2 Mechanical Soft-Thin Liquids; Consistent Carb/DASH    - Dysphagia  SLP - evaluation and treatment    Precautions / PROPHYLAXIS:   - Falls, Cardiac  - ortho: Weight bearing status: WBAT   - Lungs: Aspiration, Incentive Spirometer   - Pressure injury/Skin: Turn Q2hrs while in bed, OOB to Chair, PT/OT/SLP    Dispo: IDT Round 3/23/21  - Lives in private home with , 4 TRESSA and 10 steps inside  - Supervision for eating, grooming, Min A for lower body dressing and toilet transfers  - Goal for Mod I for self care and supervision for shower transfers  - SLP - reduced awareness deficits, mild deficits comprehension  - Tentative DC date 4/2/21 Home with Home Care    Followups:  Libman, Richard B (MD)  Neurology; Vascular Neurology  [address] [facility] [address], [address]  [place] [address]  Phone: [phone]  Fax: [phone]  Follow Up Time:     Lillian Koch)  EndocrinologyMetabDiabetes  [address] [place] [address]  Phone: [phone]  Fax: [phone]      
Patient is a 70y old  Female who presents with a chief complaint of 01.1 Left Body Involvement (Right Brain) (23 Mar 2021 09:02)    71 y/o woman with pmhx of CAD s/p stent 6 years ago, Dm2, ?CKD, HTN who presents to the ER with 1-2 days of generalized weakness.  Reportedly per her son she had fallen and was slightly confused but with no LOC or head strike. She was found to have slurred speech at some point as well. She denies any headache or vision changes. Last normal per son around 10 PM friday. in the ER pt was given asa, ceftriaxone and 2L NS.   CT head revealed acute cva involving R internal capsule, R corona radiata.   Medically stable for transfer to [facility] on 3/19/2021. Patient seen and examined at bedside, no complaints.  (19 Mar 2021 17:06)      PAST MEDICAL & SURGICAL HISTORY:  Hypertension  DM (diabetes mellitus)  CAD (coronary artery disease)  No significant past surgical history    Allergies  No Known Allergies  Intolerances    TODAY'S SUBJECTIVE & REVIEW OF SYMPTOMS:  Patient seen and evaluated this morning. No acute events overnight. Participating well in therapy. Pain is well controlled. Denies chest pain, SOB, nausea, vomiting, constipation or diarrhea. Slept very well last night.     PHYSICAL EXAM Vital Signs Last 24 Hrs   Vital Signs Last 24 Hrs  T(C): 36.6 (30 Mar 2021 08:55), Max: 37.1 (29 Mar 2021 20:19)  T(F): 97.8 (30 Mar 2021 08:55), Max: 98.7 (29 Mar 2021 20:19)  HR: 88 (30 Mar 2021 08:55) (77 - 88)  BP: 126/69 (30 Mar 2021 08:55) (126/69 - 161/87)  BP(mean): --  RR: 14 (30 Mar 2021 08:55) (14 - 16)  SpO2: 100% (30 Mar 2021 08:55) (94% - 100%)  ----------------------------------------------------------------------------------------  PHYSICAL EXAM  Constitutional - NAD, Comfortable  HEENT - NCAT, EOMI  Neck - Supple, No limited ROM  Chest - Breathing comfortably, No wheezing  Cardiovascular - S1S2   Abdomen - Soft   Extremities - No C/C/E, No calf tenderness   Neurologic Exam -                    Cognitive - Awake, Alert, AAO to self, place, date, year, situation     Communication - Fluent, No dysarthria     Cranial Nerves - slight left facial droop     Motor - No focal deficits                    LEFT    UE - ShAB 4/5, EF 4/5, EE 4/5, WE 4/5,  4/5                    RIGHT UE - ShAB 5/5, EF 5/5, EE 5/5, WE 5/5,  5/5                    LEFT    LE - HF 4/5, KE 4/5, DF 4/5, PF 4/5                    RIGHT LE - HF 5/5, KE 5/5, DF 5/5, PF 5/5        Sensory - Intact to LT     Reflexes - DTR Intact     Coordination - FTN intact on right; slight difficulty on left     OculoVestibular - No saccades, No nystagmus, VOR      Psychiatric - Mood stable, Affect WNL        RECENT LABS:                            9.3    10.58 )-----------( 399      ( 29 Mar 2021 05:00 )             28.2     03-29    139  |  107  |  72<H>  ----------------------------<  259<H>  4.2   |  23  |  3.47<H>    Ca    8.4      29 Mar 2021 05:00    TPro  5.9<L>  /  Alb  2.1<L>  /  TBili  0.1<L>  /  DBili  x   /  AST  16  /  ALT  25  /  AlkPhos  56  03-29                      MEDICATIONS  (STANDING):  aspirin  chewable 81 milliGRAM(s) Oral daily  atorvastatin 80 milliGRAM(s) Oral at bedtime  carvedilol 6.25 milliGRAM(s) Oral every 12 hours  dextrose 40% Gel 15 Gram(s) Oral once  dextrose 5%. 1000 milliLiter(s) (50 mL/Hr) IV Continuous <Continuous>  dextrose 5%. 1000 milliLiter(s) (100 mL/Hr) IV Continuous <Continuous>  dextrose 50% Injectable 25 Gram(s) IV Push once  dextrose 50% Injectable 12.5 Gram(s) IV Push once  dextrose 50% Injectable 25 Gram(s) IV Push once  fenofibrate Tablet 48 milliGRAM(s) Oral daily  glucagon  Injectable 1 milliGRAM(s) IntraMuscular once  heparin   Injectable 5000 Unit(s) SubCutaneous every 12 hours  insulin glargine Injectable (LANTUS) 18 Unit(s) SubCutaneous every morning  insulin glargine Injectable (LANTUS) 3 Unit(s) SubCutaneous at bedtime  insulin lispro (ADMELOG) corrective regimen sliding scale   SubCutaneous three times a day before meals  insulin lispro (ADMELOG) corrective regimen sliding scale   SubCutaneous at bedtime  insulin lispro Injectable (ADMELOG) 7 Unit(s) SubCutaneous before breakfast  insulin lispro Injectable (ADMELOG) 5 Unit(s) SubCutaneous before lunch  insulin lispro Injectable (ADMELOG) 7 Unit(s) SubCutaneous before dinner  NIFEdipine XL 90 milliGRAM(s) Oral daily  pantoprazole    Tablet 40 milliGRAM(s) Oral before breakfast    MEDICATIONS  (PRN):  acetaminophen   Tablet .. 650 milliGRAM(s) Oral every 6 hours PRN Temp greater or equal to 38C (100.4F), Mild Pain (1 - 3)  melatonin 9 milliGRAM(s) Oral at bedtime PRN Insomnia  senna 2 Tablet(s) Oral at bedtime PRN Constipation        IMPRESSION AND PLAN:  71 y/o woman with pmhx of CAD s/p stent 6 years ago, Dm2, ?CKD, HTN, found to have R internal capsule, R corona radiata. Now with left hemiparesis, dysphasia.    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- will need outpatient follow up  - Avoid nephrotoxic agents  - Monitor BMP- Consider renal consult if worsens  - CR 2.9 (3/24), renal consult, PVR check Q6h    #DM A1C 14  - Continue with Lantus 18U  - CAMRON - 7U pre-prandial  - Holding home oral DM regimen- hold metformin for current GFR  - Unable to start ACEI/ARB for proteinuria due to BROCK/CKD   - Diabetes Education from Diabetes Nurse Practitioner- patient declined learning to do her own fingersticks-  - If BG readings remain in current range- Discharge on Lantus or equivalent substitute  18 units every morning, Admelog or equivalent substitute 7 units before each meal.  FU with Endocrinology upon discharge    #Pain control  - Tylenol PRN    #Insomnia  - increase melatonin from 3mg to 6mg and then 9 mg. Didn't help sleep. Plan to changed melatonin to 3 mg and start trazodone 25 mg (3/26)    #GI/Bowel Mgmt   - Continent c/w Senna    #Bladder management  - Continent,     #DVT  - Heparin  - SCDs, TEDs     #Skin:  -No active issues at this time    FEN   - Diet - Dysphagia 2 Mechanical Soft-Thin Liquids; Consistent Carb/DASH    - Dysphagia  SLP - evaluation and treatment    Precautions / PROPHYLAXIS:   - Falls, Cardiac  - ortho: Weight bearing status: WBAT   - Lungs: Aspiration, Incentive Spirometer   - Pressure injury/Skin: Turn Q2hrs while in bed, OOB to Chair, PT/OT/SLP    Dispo: IDT Round 3/23/21  - Lives in private home with , 4 TRESSA and 10 steps inside  - Supervision for eating, grooming, Min A for lower body dressing and toilet transfers  - Goal for Mod I for self care and supervision for shower transfers  - SLP - reduced awareness deficits, mild deficits comprehension  - Tentative DC date 4/2/21 Home with Home Care    Followups:  Libman, Richard B (MD)  Neurology; Vascular Neurology  [address] [facility], [address]  [place] [address]  Phone: [phone]  Fax: [phone]  Follow Up Time:     Lillian Koch)  EndocrinologyMetabDiabetes  [address] [place] [address]  Phone: [phone]  Fax: [phone]      
Patient is a 70y old  Female who presents with a chief complaint of 01.1 Left Body Involvement (Right Brain) (23 Mar 2021 09:02)    71 y/o woman with pmhx of CAD s/p stent 6 years ago, Dm2, ?CKD, HTN who presents to the ER with 1-2 days of generalized weakness.  Reportedly per her son she had fallen and was slightly confused but with no LOC or head strike. She was found to have slurred speech at some point as well. She denies any headache or vision changes. Last normal per son around 10 PM friday. in the ER pt was given asa, ceftriaxone and 2L NS.   CT head revealed acute cva involving R internal capsule, R corona radiata.   Medically stable for transfer to [facility] on 3/19/2021. Patient seen and examined at bedside, no complaints.  (19 Mar 2021 17:06)      PAST MEDICAL & SURGICAL HISTORY:  Hypertension  DM (diabetes mellitus)  CAD (coronary artery disease)  No significant past surgical history    Allergies  No Known Allergies  Intolerances    TODAY'S SUBJECTIVE & REVIEW OF SYMPTOMS:  Patient seen and examined today, no acute events overnight. Patient states she did not sleep well last night, requests higher dose melatonin. Doing well in therapy, denies headache, chest pain, shortness of breath, nausea, vomiting, diarrhea, or constipation.      PHYSICAL EXAM  Vital Signs Last 24 Hrs  T(C): 36.9 (24 Mar 2021 08:30), Max: 36.9 (23 Mar 2021 20:01)  T(F): 98.5 (24 Mar 2021 08:30), Max: 98.5 (24 Mar 2021 08:30)  HR: 87 (24 Mar 2021 08:30) (87 - 92)  BP: 153/84 (24 Mar 2021 08:30) (141/71 - 172/91)  BP(mean): --  RR: 16 (24 Mar 2021 08:30) (16 - 16)  SpO2: 100% (24 Mar 2021 08:30) (99% - 100%)    ----------------------------------------------------------------------------------------  PHYSICAL EXAM  Constitutional - NAD, Comfortable  HEENT - NCAT, EOMI  Neck - Supple, No limited ROM  Chest - Breathing comfortably, No wheezing  Cardiovascular - S1S2   Abdomen - Soft   Extremities - No C/C/E, No calf tenderness   Neurologic Exam -                    Cognitive - Awake, Alert, AAO to self, place, date, year, situation     Communication - Fluent, No dysarthria     Cranial Nerves - slight left facial droop     Motor - No focal deficits                    LEFT    UE - ShAB 4/5, EF 4/5, EE 4/5, WE 4/5,  4/5                    RIGHT UE - ShAB 5/5, EF 5/5, EE 5/5, WE 5/5,  5/5                    LEFT    LE - HF 4/5, KE 4/5, DF 4/5, PF 4/5                    RIGHT LE - HF 5/5, KE 5/5, DF 5/5, PF 5/5        Sensory - Intact to LT     Reflexes - DTR Intact     Coordination - FTN intact on right; slight difficulty on left     OculoVestibular - No saccades, No nystagmus, VOR      Psychiatric - Mood stable, Affect WNL        RECENT LABS:                         11.1   10.48 )-----------( 412      ( 24 Mar 2021 12:45 )             33.1     03-24    136  |  103  |  66<H>  ----------------------------<  235<H>  4.4   |  26  |  2.96<H>    Ca    8.5      24 Mar 2021 12:45              CAPILLARY BLOOD GLUCOSE  POCT Blood Glucose.: 159 mg/dL (23 Mar 2021 11:33)  POCT Blood Glucose.: 140 mg/dL (23 Mar 2021 07:55)  POCT Blood Glucose.: 217 mg/dL (22 Mar 2021 21:12)  POCT Blood Glucose.: 91 mg/dL (22 Mar 2021 16:43)  POCT Blood Glucose.: 120 mg/dL (22 Mar 2021 12:14)      MEDICATIONS  (STANDING):  aspirin  chewable 81 milliGRAM(s) Oral daily  atorvastatin 80 milliGRAM(s) Oral at bedtime  carvedilol 6.25 milliGRAM(s) Oral every 12 hours  dextrose 40% Gel 15 Gram(s) Oral once  dextrose 5%. 1000 milliLiter(s) (50 mL/Hr) IV Continuous <Continuous>  dextrose 5%. 1000 milliLiter(s) (100 mL/Hr) IV Continuous <Continuous>  dextrose 50% Injectable 25 Gram(s) IV Push once  dextrose 50% Injectable 12.5 Gram(s) IV Push once  dextrose 50% Injectable 25 Gram(s) IV Push once  fenofibrate Tablet 48 milliGRAM(s) Oral daily  glucagon  Injectable 1 milliGRAM(s) IntraMuscular once  heparin   Injectable 5000 Unit(s) SubCutaneous every 12 hours  insulin glargine Injectable (LANTUS) 18 Unit(s) SubCutaneous every morning  insulin lispro (ADMELOG) corrective regimen sliding scale   SubCutaneous three times a day before meals  insulin lispro (ADMELOG) corrective regimen sliding scale   SubCutaneous at bedtime  insulin lispro Injectable (ADMELOG) 7 Unit(s) SubCutaneous three times a day before meals  melatonin 3 milliGRAM(s) Oral at bedtime  NIFEdipine XL 90 milliGRAM(s) Oral daily  pantoprazole    Tablet 40 milliGRAM(s) Oral before breakfast    MEDICATIONS  (PRN):  acetaminophen   Tablet .. 650 milliGRAM(s) Oral every 6 hours PRN Temp greater or equal to 38C (100.4F), Mild Pain (1 - 3)  senna 2 Tablet(s) Oral at bedtime PRN Constipation      IMPRESSION AND PLAN:  71 y/o woman with pmhx of CAD s/p stent 6 years ago, Dm2, ?CKD, HTN, found to have R internal capsule, R corona radiata. Now with left hemiparesis, dysphasia.    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- will need outpatient follow up  - Avoid nephrotoxic agents  - Monitor BMP- Consider renal consult if worsens  - CR 2.9 (3/24), renal consult, PVR check Q6h    #DM A1C 14  - Continue with Lantus 18U  - CAMRON - 7U pre-prandial  - Holding home oral DM regimen- hold metformin for current GFR  - Unable to start ACEI/ARB for proteinuria due to BROCK/CKD   - Diabetes Education from Diabetes Nurse Practitioner- patient declined learning to do her own fingersticks-  - If BG readings remain in current range- Discharge on Lantus or equivalent substitute  18 units every morning, Admelog or equivalent substitute 7 units before each meal.  FU with Endocrinology upon discharge    #Pain control  - Tylenol PRN    #Insomnia  - increase melatonin from 3mg to 6mg     #GI/Bowel Mgmt   - Continent c/w Senna    #Bladder management  - Continent,     #DVT  - Heparin  - SCDs, TEDs     #Skin:  -No active issues at this time    FEN   - Diet - Dysphagia 2 Mechanical Soft-Thin Liquids; Consistent Carb/DASH    - Dysphagia  SLP - evaluation and treatment    Precautions / PROPHYLAXIS:   - Falls, Cardiac  - ortho: Weight bearing status: WBAT   - Lungs: Aspiration, Incentive Spirometer   - Pressure injury/Skin: Turn Q2hrs while in bed, OOB to Chair, PT/OT/SLP    Dispo: IDT Round 3/23/21  - Lives in private home with , 4 TRESSA and 10 steps inside  - Supervision for eating, grooming, Min A for lower body dressing and toilet transfers  - Goal for Mod I for self care and supervision for shower transfers  - SLP - reduced awareness deficits, mild deficits comprehension  - Tentative DC date 4/2/21 Home with Home Care    Followups:  Libman, Richard B (MD)  Neurology; Vascular Neurology  [address], [address]  [place] [address]  Phone: [phone]  Fax: [phone]  Follow Up Time:     Lillian Koch)  Endocrinology[facility]ab[facility]abetes  [address] [place] [address]  Phone: [phone]  Fax: [phone]      
Patient is a 70y old  Female who presents with a chief complaint of 01.1 Left Body Involvement (Right Brain) (27 Mar 2021 18:29)      No overnight events noted.  Patient seen and examined at bedside.  No new/acute symptoms reported.    ALLERGIES:  No Known Allergies    MEDICATIONS  (STANDING):  aspirin  chewable 81 milliGRAM(s) Oral daily  atorvastatin 80 milliGRAM(s) Oral at bedtime  carvedilol 6.25 milliGRAM(s) Oral every 12 hours  dextrose 40% Gel 15 Gram(s) Oral once  dextrose 5%. 1000 milliLiter(s) (50 mL/Hr) IV Continuous <Continuous>  dextrose 5%. 1000 milliLiter(s) (100 mL/Hr) IV Continuous <Continuous>  dextrose 50% Injectable 25 Gram(s) IV Push once  dextrose 50% Injectable 12.5 Gram(s) IV Push once  dextrose 50% Injectable 25 Gram(s) IV Push once  fenofibrate Tablet 48 milliGRAM(s) Oral daily  glucagon  Injectable 1 milliGRAM(s) IntraMuscular once  heparin   Injectable 5000 Unit(s) SubCutaneous every 12 hours  insulin glargine Injectable (LANTUS) 3 Unit(s) SubCutaneous at bedtime  insulin glargine Injectable (LANTUS) 18 Unit(s) SubCutaneous every morning  insulin lispro (ADMELOG) corrective regimen sliding scale   SubCutaneous three times a day before meals  insulin lispro (ADMELOG) corrective regimen sliding scale   SubCutaneous at bedtime  insulin lispro Injectable (ADMELOG) 7 Unit(s) SubCutaneous before breakfast  insulin lispro Injectable (ADMELOG) 5 Unit(s) SubCutaneous before lunch  insulin lispro Injectable (ADMELOG) 7 Unit(s) SubCutaneous before dinner  melatonin 3 milliGRAM(s) Oral at bedtime  NIFEdipine XL 90 milliGRAM(s) Oral daily  pantoprazole    Tablet 40 milliGRAM(s) Oral before breakfast  traZODone 25 milliGRAM(s) Oral at bedtime    MEDICATIONS  (PRN):  acetaminophen   Tablet .. 650 milliGRAM(s) Oral every 6 hours PRN Temp greater or equal to 38C (100.4F), Mild Pain (1 - 3)  senna 2 Tablet(s) Oral at bedtime PRN Constipation    Vital Signs Last 24 Hrs  T(F): 98 (28 Mar 2021 07:35), Max: 98.7 (27 Mar 2021 21:45)  HR: 87 (28 Mar 2021 07:35) (87 - 91)  BP: 154/82 (28 Mar 2021 07:35) (138/75 - 154/82)  RR: 16 (28 Mar 2021 07:35) (15 - 16)  SpO2: 100% (28 Mar 2021 07:35) (95% - 100%)    PHYSICAL EXAM:  GENERAL: NAD, thin female  HEAD:  Atraumatic, Normocephalic  CHEST/LUNG: Clear to auscultation bilaterally; No wheeze, nonlabored breathing  HEART: Regular rate and rhythm; No murmurs, rubs, or gallops  ABDOMEN: Soft, Nontender, Nondistended; Bowel sounds present  EXTREMITIES:  2+ Peripheral Pulses, No clubbing, cyanosis, or edema  NEUROLOGY: AAOx3, non-focal  PSYCH: calm, appropriate mood    LABS:          140  |  106  |  28  ----------------------------<  110  4.4   |  29  |  1.10    Ca    8.4      28 Mar 2021 08:05       eGFR if Non African American: 51 mL/min/1.73M2 (21 @ 08:05)  eGFR if : 59 mL/min/1.73M2 (21 @ 08:05)     Chol 263 mg/dL LDL -- HDL 34 mg/dL Trig 396 mg/dL    POCT Blood Glucose.: 149 mg/dL (28 Mar 2021 11:57)  POCT Blood Glucose.: 206 mg/dL (28 Mar 2021 07:35)  POCT Blood Glucose.: 143 mg/dL (27 Mar 2021 21:41)  POCT Blood Glucose.: 156 mg/dL (27 Mar 2021 17:01)    Urinalysis Basic - ( 25 Mar 2021 20:50 )    Color: Yellow / Appearance: Clear / S.010 / pH: x  Gluc: x / Ketone: Negative  / Bili: Negative / Urobili: Negative   Blood: x / Protein: 500 mg/dL / Nitrite: Negative   Leuk Esterase: Negative / RBC: 0-4 /HPF / WBC 0-2 /HPF   Sq Epi: x / Non Sq Epi: Neg.-Few / Bacteria: Negative /HPF    
Resting    Vital Signs Last 24 Hrs  T(C): 36.6 (03-28-21 @ 20:35), Max: 36.7 (03-28-21 @ 07:35)  T(F): 97.8 (03-28-21 @ 20:35), Max: 98 (03-28-21 @ 07:35)  HR: 92 (03-28-21 @ 20:35) (87 - 92)  BP: 144/80 (03-28-21 @ 20:35) (144/80 - 154/82)  RR: 15 (03-28-21 @ 20:35) (15 - 16)  SpO2: 98% (03-28-21 @ 20:35) (95% - 100%)    s1s2  clear b/l  soft, ND  no edema             28 Mar 2021 14:40    135    |  105    |  74     ----------------------------<  181    4.7     |  23     |  3.35     Ca    8.5        28 Mar 2021 14:40    acetaminophen   Tablet .. 650 milliGRAM(s) Oral every 6 hours PRN  aspirin  chewable 81 milliGRAM(s) Oral daily  atorvastatin 80 milliGRAM(s) Oral at bedtime  carvedilol 6.25 milliGRAM(s) Oral every 12 hours  dextrose 40% Gel 15 Gram(s) Oral once  dextrose 5%. 1000 milliLiter(s) IV Continuous <Continuous>  dextrose 5%. 1000 milliLiter(s) IV Continuous <Continuous>  dextrose 50% Injectable 25 Gram(s) IV Push once  dextrose 50% Injectable 12.5 Gram(s) IV Push once  dextrose 50% Injectable 25 Gram(s) IV Push once  fenofibrate Tablet 48 milliGRAM(s) Oral daily  glucagon  Injectable 1 milliGRAM(s) IntraMuscular once  heparin   Injectable 5000 Unit(s) SubCutaneous every 12 hours  insulin glargine Injectable (LANTUS) 18 Unit(s) SubCutaneous every morning  insulin glargine Injectable (LANTUS) 3 Unit(s) SubCutaneous at bedtime  insulin lispro (ADMELOG) corrective regimen sliding scale   SubCutaneous three times a day before meals  insulin lispro (ADMELOG) corrective regimen sliding scale   SubCutaneous at bedtime  insulin lispro Injectable (ADMELOG) 5 Unit(s) SubCutaneous before lunch  insulin lispro Injectable (ADMELOG) 7 Unit(s) SubCutaneous before dinner  insulin lispro Injectable (ADMELOG) 7 Unit(s) SubCutaneous before breakfast  melatonin 3 milliGRAM(s) Oral at bedtime  NIFEdipine XL 90 milliGRAM(s) Oral daily  pantoprazole    Tablet 40 milliGRAM(s) Oral before breakfast  senna 2 Tablet(s) Oral at bedtime PRN  traZODone 25 milliGRAM(s) Oral at bedtime    A/P:    Appears to have DM CKD 4 w/proteinuria and baseline Cr ~ 2 (Cr 2.13 - 3/20/21)  SONO w/small L kidney, no hydro  Etiology of BROCK not obvious, possibly pre-enal   Gentle IVF  BMP in am  Check UA, lytes, eos  F/u renal serologies  Avoid nephrotoxins    [phone]                      
Resting    Vital Signs Last 24 Hrs  T(C): 37 (21 @ 07:30), Max: 37.2 (21 @ 20:35)  T(F): 98.6 (21 @ 07:30), Max: 98.9 (21 @ 20:35)  HR: 87 (21 @ 07:30) (87 - 96)  BP: 163/84 (21 @ 07:30) (155/89 - 163/84)  RR: 16 (21 @ 07:30) (14 - 16)  SpO2: 100% (21 @ 07:30) (96% - 100%)    s1s2  clear b/l  soft, ND  no edema             27 Mar 2021 07:33    139    |  106    |  60     ----------------------------<  260    4.1     |  24     |  2.67     Ca    8.3        27 Mar 2021 07:33    Urinalysis Basic - ( 25 Mar 2021 20:50 )    Color: Yellow / Appearance: Clear / S.010 / pH: x  Gluc: x / Ketone: Negative  / Bili: Negative / Urobili: Negative   Blood: x / Protein: 500 mg/dL / Nitrite: Negative   Leuk Esterase: Negative / RBC: 0-4 /HPF / WBC 0-2 /HPF   Sq Epi: x / Non Sq Epi: Neg.-Few / Bacteria: Negative /HPF    acetaminophen   Tablet .. 650 milliGRAM(s) Oral every 6 hours PRN  aspirin  chewable 81 milliGRAM(s) Oral daily  atorvastatin 80 milliGRAM(s) Oral at bedtime  carvedilol 6.25 milliGRAM(s) Oral every 12 hours  dextrose 40% Gel 15 Gram(s) Oral once  dextrose 5%. 1000 milliLiter(s) IV Continuous <Continuous>  dextrose 5%. 1000 milliLiter(s) IV Continuous <Continuous>  dextrose 50% Injectable 25 Gram(s) IV Push once  dextrose 50% Injectable 25 Gram(s) IV Push once  dextrose 50% Injectable 12.5 Gram(s) IV Push once  fenofibrate Tablet 48 milliGRAM(s) Oral daily  glucagon  Injectable 1 milliGRAM(s) IntraMuscular once  heparin   Injectable 5000 Unit(s) SubCutaneous every 12 hours  insulin glargine Injectable (LANTUS) 18 Unit(s) SubCutaneous every morning  insulin glargine Injectable (LANTUS) 3 Unit(s) SubCutaneous at bedtime  insulin lispro (ADMELOG) corrective regimen sliding scale   SubCutaneous three times a day before meals  insulin lispro (ADMELOG) corrective regimen sliding scale   SubCutaneous at bedtime  insulin lispro Injectable (ADMELOG) 7 Unit(s) SubCutaneous before breakfast  insulin lispro Injectable (ADMELOG) 5 Unit(s) SubCutaneous before lunch  insulin lispro Injectable (ADMELOG) 7 Unit(s) SubCutaneous before dinner  melatonin 3 milliGRAM(s) Oral at bedtime  NIFEdipine XL 90 milliGRAM(s) Oral daily  pantoprazole    Tablet 40 milliGRAM(s) Oral before breakfast  senna 2 Tablet(s) Oral at bedtime PRN  sodium chloride 0.45%. 1000 milliLiter(s) IV Continuous <Continuous>  traZODone 25 milliGRAM(s) Oral at bedtime    A/P:    Appears to have DM CKD 4 w/proteinuria and baseline Cr ~ 2 (Cr 2.13 - 3/20/21)  SONO w/small L kidney, no hydro  Etiology of BROCK not obvious, possibly pre-enal   Cr is better w/IVF  BMP in am  Check UA, lytes  Will f/u renal serologies  Avoid nephrotoxins    [phone]                      
Subjective: no complaints.       MEDICATIONS  (STANDING):  aspirin  chewable 81 milliGRAM(s) Oral daily  atorvastatin 80 milliGRAM(s) Oral at bedtime  carvedilol 6.25 milliGRAM(s) Oral every 12 hours  dextrose 40% Gel 15 Gram(s) Oral once  dextrose 5%. 1000 milliLiter(s) (50 mL/Hr) IV Continuous <Continuous>  dextrose 5%. 1000 milliLiter(s) (100 mL/Hr) IV Continuous <Continuous>  dextrose 50% Injectable 25 Gram(s) IV Push once  dextrose 50% Injectable 12.5 Gram(s) IV Push once  dextrose 50% Injectable 25 Gram(s) IV Push once  glucagon  Injectable 1 milliGRAM(s) IntraMuscular once  heparin   Injectable 5000 Unit(s) SubCutaneous every 12 hours  insulin glargine Injectable (LANTUS) 10 Unit(s) SubCutaneous at bedtime  insulin glargine Injectable (LANTUS) 10 Unit(s) SubCutaneous every morning  insulin lispro (ADMELOG) corrective regimen sliding scale   SubCutaneous three times a day before meals  insulin lispro (ADMELOG) corrective regimen sliding scale   SubCutaneous at bedtime  insulin lispro Injectable (ADMELOG) 7 Unit(s) SubCutaneous before dinner  insulin lispro Injectable (ADMELOG) 7 Unit(s) SubCutaneous before breakfast  insulin lispro Injectable (ADMELOG) 5 Unit(s) SubCutaneous before lunch  NIFEdipine XL 90 milliGRAM(s) Oral daily  sodium chloride 0.9%. 1000 milliLiter(s) (75 mL/Hr) IV Continuous <Continuous>  traZODone 25 milliGRAM(s) Oral <User Schedule>    MEDICATIONS  (PRN):  acetaminophen   Tablet .. 650 milliGRAM(s) Oral every 6 hours PRN Temp greater or equal to 38C (100.4F), Mild Pain (1 - 3)  senna 2 Tablet(s) Oral at bedtime PRN Constipation          T(C): 36.6 (03-31-21 @ 07:47), Max: 36.6 (03-31-21 @ 07:47)  HR: 85 (03-31-21 @ 07:47) (78 - 88)  BP: 156/70 (03-31-21 @ 07:47) (155/71 - 178/96)  RR: 15 (03-31-21 @ 07:47) (14 - 15)  SpO2: 99% (03-31-21 @ 07:47) (98% - 99%)  Wt(kg): --        I&O's Detail    30 Mar 2021 07:01  -  31 Mar 2021 07:00  --------------------------------------------------------  IN:    Oral Fluid: 1 mL    sodium chloride 0.9%: 150 mL    sodium chloride 0.9%: 675 mL  Total IN: 826 mL    OUT:    Voided (mL): 1 mL  Total OUT: 1 mL    Total NET: 825 mL               PHYSICAL EXAM:    GENERAL: NAD  NECK: Supple, no inc in JVP  CHEST/LUNG: Clear  HEART: S1S2  ABDOMEN: Soft, Nontender, Nondistended; Bowel sounds present  EXTREMITIES:  no edema        LABS:    03-31    142  |  108  |  66<H>  ----------------------------<  115<H>  4.1   |  22  |  2.98<H>    Ca    8.6      31 Mar 2021 05:00          ASSESSMENT:   Inpatient BROCK x 1 week old, etiology not obvious. Eosinophilia raises concern for ATIN. Cr at plateau  CKD 4 due to DM w/proteinuria and baseline Cr ~ 2 (Cr 2.13 - 3/20/21)  Atrophic L kidney    RECOMMEND :   Discontinue saline.   Daily BMP  May dc to outpt renal f/u              
Denies complaints    Vital Signs Last 24 Hrs  T(C): 36.8 (04-05-21 @ 20:39), Max: 36.8 (04-05-21 @ 20:39)  T(F): 98.3 (04-05-21 @ 20:39), Max: 98.3 (04-05-21 @ 20:39)  HR: 92 (04-05-21 @ 20:39) (81 - 96)  BP: 136/69 (04-05-21 @ 20:39) (136/69 - 169/75)  RR: 14 (04-05-21 @ 20:39) (14 - 14)  SpO2: 98% (04-05-21 @ 20:39) (95% - 98%)    s1s2  clear b/l  soft, ND  no edema                                 9.1    10.41 )-----------( 467      ( 05 Apr 2021 05:30 )             27.6     05 Apr 2021 05:30    141    |  108    |  66     ----------------------------<  123    4.4     |  22     |  2.62     Ca    8.9        05 Apr 2021 05:30    TPro  6.1    /  Alb  2.3    /  TBili  <0.1   /  DBili  x      /  AST  22     /  ALT  28     /  AlkPhos  58     05 Apr 2021 05:30    LIVER FUNCTIONS - ( 05 Apr 2021 05:30 )  Alb: 2.3 g/dL / Pro: 6.1 g/dL / ALK PHOS: 58 U/L / ALT: 28 U/L / AST: 22 U/L / GGT: x           acetaminophen   Tablet .. 650 milliGRAM(s) Oral every 6 hours PRN  aspirin  chewable 81 milliGRAM(s) Oral daily  atorvastatin 80 milliGRAM(s) Oral at bedtime  carvedilol 12.5 milliGRAM(s) Oral every 12 hours  dextrose 40% Gel 15 Gram(s) Oral once  dextrose 5%. 1000 milliLiter(s) IV Continuous <Continuous>  dextrose 5%. 1000 milliLiter(s) IV Continuous <Continuous>  dextrose 50% Injectable 25 Gram(s) IV Push once  dextrose 50% Injectable 12.5 Gram(s) IV Push once  dextrose 50% Injectable 25 Gram(s) IV Push once  glucagon  Injectable 1 milliGRAM(s) IntraMuscular once  heparin   Injectable 5000 Unit(s) SubCutaneous every 12 hours  insulin glargine Injectable (LANTUS) 10 Unit(s) SubCutaneous every morning  insulin glargine Injectable (LANTUS) 10 Unit(s) SubCutaneous at bedtime  insulin lispro (ADMELOG) corrective regimen sliding scale   SubCutaneous three times a day before meals  insulin lispro (ADMELOG) corrective regimen sliding scale   SubCutaneous at bedtime  insulin lispro Injectable (ADMELOG) 5 Unit(s) SubCutaneous three times a day before meals  NIFEdipine XL 90 milliGRAM(s) Oral daily  senna 2 Tablet(s) Oral at bedtime PRN  traZODone 25 milliGRAM(s) Oral <User Schedule>    A/P:    Appears to have DM CKD 4 w/proteinuria and baseline Cr ~ 2 (Cr 2.13 - 3/20/21)  SONO w/small L kidney, no hydro  Etiology of BROCK not obvious, possibly pre-enal   Cr better today  BMP in am  Renal serologies are negative  Avoid nephrotoxins  Will follow    [phone]                      
Medicine Progress Note    Patient is a 70y old  Female who presents with a chief complaint of 01.1 Left Body Involvement (Right Brain) (31 Mar 2021 14:14)      SUBJECTIVE / OVERNIGHT EVENTS:      No overnight events  Limb weakness improving with therapy  BM+  No pain  no complaints      ADDITIONAL REVIEW OF SYSTEMS:  no fever/chills/CP/sob/palpitation/dizziness/ abd pain/nausea/vomiting/diarrhea/constipation/headaches. all other ROS neg    MEDICATIONS  (STANDING):  aspirin  chewable 81 milliGRAM(s) Oral daily  atorvastatin 80 milliGRAM(s) Oral at bedtime  carvedilol 12.5 milliGRAM(s) Oral every 12 hours  dextrose 40% Gel 15 Gram(s) Oral once  dextrose 5%. 1000 milliLiter(s) (50 mL/Hr) IV Continuous <Continuous>  dextrose 5%. 1000 milliLiter(s) (100 mL/Hr) IV Continuous <Continuous>  dextrose 50% Injectable 25 Gram(s) IV Push once  dextrose 50% Injectable 12.5 Gram(s) IV Push once  dextrose 50% Injectable 25 Gram(s) IV Push once  glucagon  Injectable 1 milliGRAM(s) IntraMuscular once  heparin   Injectable 5000 Unit(s) SubCutaneous every 12 hours  insulin glargine Injectable (LANTUS) 10 Unit(s) SubCutaneous every morning  insulin glargine Injectable (LANTUS) 10 Unit(s) SubCutaneous at bedtime  insulin lispro (ADMELOG) corrective regimen sliding scale   SubCutaneous three times a day before meals  insulin lispro (ADMELOG) corrective regimen sliding scale   SubCutaneous at bedtime  insulin lispro Injectable (ADMELOG) 5 Unit(s) SubCutaneous three times a day before meals  NIFEdipine XL 90 milliGRAM(s) Oral daily  traZODone 25 milliGRAM(s) Oral <User Schedule>    MEDICATIONS  (PRN):  acetaminophen   Tablet .. 650 milliGRAM(s) Oral every 6 hours PRN Temp greater or equal to 38C (100.4F), Mild Pain (1 - 3)  senna 2 Tablet(s) Oral at bedtime PRN Constipation          PHYSICAL EXAM:    Vital Signs Last 24 Hrs  T(C): 36.7 (04 Apr 2021 08:35), Max: 37.2 (03 Apr 2021 20:29)  T(F): 98 (04 Apr 2021 08:35), Max: 99 (03 Apr 2021 20:29)  HR: 68 (04 Apr 2021 08:35) (68 - 91)  BP: 126/68 (04 Apr 2021 08:35) (126/68 - 166/76)  BP(mean): --  RR: 14 (04 Apr 2021 08:35) (14 - 16)  SpO2: 97% (04 Apr 2021 08:35) (95% - 97%)    CAPILLARY BLOOD GLUCOSE      POCT Blood Glucose.: 189 mg/dL (04 Apr 2021 07:27)  POCT Blood Glucose.: 168 mg/dL (03 Apr 2021 22:01)  POCT Blood Glucose.: 114 mg/dL (03 Apr 2021 17:03)  POCT Blood Glucose.: 218 mg/dL (03 Apr 2021 11:49)      GENERAL: Not in distress. Alert    HEENT:  MMM  NECK: Supple.    CARDIOVASCULAR: RRR S1, S2. No murmur/rubs/gallop  LUNGS: BLAE+, no rales, no wheezing, no rhonchi.    ABDOMEN: ND. Soft,  NT, no guarding / rebound / rigidity.   EXTREMITIES: no edema. no leg or calf TP.  SKIN: warm and dry  NEUROLOGIC: AAO*3. left sided weakness  PSYCHIATRIC: Calm.  No agitation.    LABS:             04-03    142  |  110<H>  |  75<H>  ----------------------------<  86  4.1   |  23  |  2.81<H>    Ca    8.9      03 Apr 2021 05:30              COVID-19 PCR: NotDetec (30 Mar 2021 22:00)  COVID-19 PCR: NotDetec (19 Mar 2021 11:43)  COVID-19 PCR: NotDetec (14 Mar 2021 00:37)      RADIOLOGY & ADDITIONAL TESTS:  Imaging from Last 24 Hours:    Electrocardiogram/QTc Interval:    COORDINATION OF CARE:  Care Discussed with Consultants/Other Providers:  
Medicine Progress Note    Patient is a 70y old  Female who presents with a chief complaint of 01.1 Left Body Involvement (Right Brain) (31 Mar 2021 14:14)      SUBJECTIVE / OVERNIGHT EVENTS:  seen and examined  Chart reviewed  No overnight events  Limb weakness improving with therapy  BM+  No pain      ADDITIONAL REVIEW OF SYSTEMS:  no fever/chills/CP/sob/palpitation/dizziness/ abd pain/nausea/vomiting/diarrhea/constipation/headaches. all other ROS neg    MEDICATIONS  (STANDING):  aspirin  chewable 81 milliGRAM(s) Oral daily  atorvastatin 80 milliGRAM(s) Oral at bedtime  carvedilol 12.5 milliGRAM(s) Oral every 12 hours  dextrose 40% Gel 15 Gram(s) Oral once  dextrose 5%. 1000 milliLiter(s) (50 mL/Hr) IV Continuous <Continuous>  dextrose 5%. 1000 milliLiter(s) (100 mL/Hr) IV Continuous <Continuous>  dextrose 50% Injectable 25 Gram(s) IV Push once  dextrose 50% Injectable 12.5 Gram(s) IV Push once  dextrose 50% Injectable 25 Gram(s) IV Push once  glucagon  Injectable 1 milliGRAM(s) IntraMuscular once  heparin   Injectable 5000 Unit(s) SubCutaneous every 12 hours  insulin glargine Injectable (LANTUS) 10 Unit(s) SubCutaneous every morning  insulin glargine Injectable (LANTUS) 10 Unit(s) SubCutaneous at bedtime  insulin lispro (ADMELOG) corrective regimen sliding scale   SubCutaneous three times a day before meals  insulin lispro (ADMELOG) corrective regimen sliding scale   SubCutaneous at bedtime  insulin lispro Injectable (ADMELOG) 7 Unit(s) SubCutaneous before breakfast  insulin lispro Injectable (ADMELOG) 5 Unit(s) SubCutaneous before lunch  insulin lispro Injectable (ADMELOG) 7 Unit(s) SubCutaneous before dinner  NIFEdipine XL 90 milliGRAM(s) Oral daily  traZODone 25 milliGRAM(s) Oral <User Schedule>    MEDICATIONS  (PRN):  acetaminophen   Tablet .. 650 milliGRAM(s) Oral every 6 hours PRN Temp greater or equal to 38C (100.4F), Mild Pain (1 - 3)  senna 2 Tablet(s) Oral at bedtime PRN Constipation    CAPILLARY BLOOD GLUCOSE      POCT Blood Glucose.: 192 mg/dL (01 Apr 2021 08:36)  POCT Blood Glucose.: 72 mg/dL (31 Mar 2021 21:38)  POCT Blood Glucose.: 178 mg/dL (31 Mar 2021 16:40)  POCT Blood Glucose.: 146 mg/dL (31 Mar 2021 11:52)    I&O's Summary      PHYSICAL EXAM:  Vital Signs Last 24 Hrs  T(C): 36.5 (01 Apr 2021 10:13), Max: 36.5 (01 Apr 2021 10:13)  T(F): 97.7 (01 Apr 2021 10:13), Max: 97.7 (01 Apr 2021 10:13)  HR: 85 (01 Apr 2021 10:13) (85 - 88)  BP: 130/68 (01 Apr 2021 10:13) (130/68 - 176/81)  BP(mean): --  RR: 16 (01 Apr 2021 10:13) (16 - 16)  SpO2: 99% (01 Apr 2021 10:13) (99% - 99%)    GENERAL: Not in distress. Alert    HEENT:  MMM  NECK: Supple.    CARDIOVASCULAR: RRR S1, S2. No murmur/rubs/gallop  LUNGS: BLAE+, no rales, no wheezing, no rhonchi.    ABDOMEN: ND. Soft,  NT, no guarding / rebound / rigidity. BS normoactive. No CVA tenderness.    BACK: No spine tenderness.  EXTREMITIES: no edema. no leg or calf TP.  SKIN: warm and dry  NEUROLOGIC: AAO*3. left sided weakness  PSYCHIATRIC: Calm.  No agitation.    LABS:                        10.2   10.10 )-----------( 458      ( 01 Apr 2021 05:00 )             31.3     04-01    140  |  105  |  73<H>  ----------------------------<  161<H>  4.0   |  21<L>  |  2.90<H>    Ca    8.7      01 Apr 2021 05:00    TPro  6.4  /  Alb  2.4<L>  /  TBili  0.2  /  DBili  x   /  AST  28  /  ALT  35  /  AlkPhos  59  04-01      CARDIAC MARKERS ( 31 Mar 2021 05:00 )  x     / x     / 73 U/L / x     / x              COVID-19 PCR: NotDetec (30 Mar 2021 22:00)  COVID-19 PCR: NotDetec (19 Mar 2021 11:43)  COVID-19 PCR: NotDetec (14 Mar 2021 00:37)      RADIOLOGY & ADDITIONAL TESTS:  Imaging from Last 24 Hours:    Electrocardiogram/QTc Interval:    COORDINATION OF CARE:  Care Discussed with Consultants/Other Providers:  
No distress    Vital Signs Last 24 Hrs  T(C): 36.3 (04-06-21 @ 07:39), Max: 36.3 (04-06-21 @ 07:39)  T(F): 97.4 (04-06-21 @ 07:39), Max: 97.4 (04-06-21 @ 07:39)  HR: 83 (04-06-21 @ 07:39) (83 - 90)  BP: 161/79 (04-06-21 @ 07:39) (161/79 - 170/77)  RR: 15 (04-06-21 @ 07:39) (14 - 15)  SpO2: 100% (04-06-21 @ 07:39) (97% - 100%)    s1s2  clear b/l  soft, ND  no edema                                         9.1    10.41 )-----------( 467      ( 05 Apr 2021 05:30 )             27.6     06 Apr 2021 06:15    139    |  106    |  64     ----------------------------<  223    4.2     |  23     |  2.65     Ca    8.4        06 Apr 2021 06:15    TPro  6.1    /  Alb  2.3    /  TBili  <0.1   /  DBili  x      /  AST  22     /  ALT  28     /  AlkPhos  58     05 Apr 2021 05:30    LIVER FUNCTIONS - ( 05 Apr 2021 05:30 )  Alb: 2.3 g/dL / Pro: 6.1 g/dL / ALK PHOS: 58 U/L / ALT: 28 U/L / AST: 22 U/L / GGT: x           acetaminophen   Tablet .. 650 milliGRAM(s) Oral every 6 hours PRN  aspirin  chewable 81 milliGRAM(s) Oral daily  atorvastatin 80 milliGRAM(s) Oral at bedtime  carvedilol 12.5 milliGRAM(s) Oral every 12 hours  dextrose 40% Gel 15 Gram(s) Oral once  dextrose 5%. 1000 milliLiter(s) IV Continuous <Continuous>  dextrose 5%. 1000 milliLiter(s) IV Continuous <Continuous>  dextrose 50% Injectable 25 Gram(s) IV Push once  dextrose 50% Injectable 12.5 Gram(s) IV Push once  dextrose 50% Injectable 25 Gram(s) IV Push once  glucagon  Injectable 1 milliGRAM(s) IntraMuscular once  heparin   Injectable 5000 Unit(s) SubCutaneous every 12 hours  insulin glargine Injectable (LANTUS) 10 Unit(s) SubCutaneous every morning  insulin glargine Injectable (LANTUS) 10 Unit(s) SubCutaneous at bedtime  insulin lispro (ADMELOG) corrective regimen sliding scale   SubCutaneous at bedtime  insulin lispro (ADMELOG) corrective regimen sliding scale   SubCutaneous three times a day before meals  insulin lispro Injectable (ADMELOG) 5 Unit(s) SubCutaneous three times a day before meals  NIFEdipine XL 90 milliGRAM(s) Oral daily  senna 2 Tablet(s) Oral at bedtime PRN  traZODone 25 milliGRAM(s) Oral <User Schedule>    A/P:    Appears to have DM CKD 4 w/proteinuria and baseline Cr ~ 2 (Cr 2.13 - 3/20/21)  SONO w/small L kidney, no hydro  Etiology of BROCK not obvious, possibly pre-enal   BROCK w/improvement but incomplete resolution   Renal serologies are negative  Avoid nephrotoxins  No NSAID's (d/w pt)  Renal f/u as op    [phone]                      
Patient is a 70y old  Female who presents with a chief complaint of 01.1 Left Body Involvement (Right Brain) (20 Mar 2021 10:02)      Patient seen and examined at bedside.  No overnight events  No complaints this morning  Feels more motivated after first PT  FS noted    ALLERGIES:  No Known Allergies    MEDICATIONS  (STANDING):  aspirin  chewable 81 milliGRAM(s) Oral daily  atorvastatin 80 milliGRAM(s) Oral at bedtime  dextrose 40% Gel 15 Gram(s) Oral once  dextrose 5%. 1000 milliLiter(s) (50 mL/Hr) IV Continuous <Continuous>  dextrose 5%. 1000 milliLiter(s) (100 mL/Hr) IV Continuous <Continuous>  dextrose 50% Injectable 25 Gram(s) IV Push once  dextrose 50% Injectable 12.5 Gram(s) IV Push once  dextrose 50% Injectable 25 Gram(s) IV Push once  fenofibrate Tablet 48 milliGRAM(s) Oral daily  glucagon  Injectable 1 milliGRAM(s) IntraMuscular once  heparin   Injectable 5000 Unit(s) SubCutaneous every 12 hours  insulin glargine Injectable (LANTUS) 18 Unit(s) SubCutaneous every morning  insulin lispro (ADMELOG) corrective regimen sliding scale   SubCutaneous three times a day before meals  insulin lispro (ADMELOG) corrective regimen sliding scale   SubCutaneous at bedtime  insulin lispro Injectable (ADMELOG) 7 Unit(s) SubCutaneous three times a day before meals  melatonin 3 milliGRAM(s) Oral at bedtime  NIFEdipine XL 90 milliGRAM(s) Oral daily  pantoprazole    Tablet 40 milliGRAM(s) Oral before breakfast    MEDICATIONS  (PRN):  acetaminophen   Tablet .. 650 milliGRAM(s) Oral every 6 hours PRN Temp greater or equal to 38C (100.4F), Mild Pain (1 - 3)  senna 2 Tablet(s) Oral at bedtime PRN Constipation    Vital Signs Last 24 Hrs  T(F): 98.3 (21 Mar 2021 09:01), Max: 98.3 (21 Mar 2021 09:01)  HR: 100 (21 Mar 2021 09:01) (97 - 100)  BP: 156/77 (21 Mar 2021 09:01) (150/81 - 158/83)  RR: 16 (21 Mar 2021 09:01) (14 - 16)  SpO2: 97% (21 Mar 2021 09:01) (97% - 100%)  I&O's Summary    20 Mar 2021 07:01  -  21 Mar 2021 07:00  --------------------------------------------------------  IN: 0 mL / OUT: 1 mL / NET: -1 mL      PHYSICAL EXAM:  GENERAL: NAD  HENT:  Atraumatic, Normocephalic; No tonsillar erythema, exudates, or enlargement; Moist mucous membranes;   EYES: EOMI, PERRLA, conjunctiva and sclera clear, no lid-lag  NECK: Supple, No JVD, Normal thyroid  CHEST/LUNG: Clear to percussion bilaterally; No rales, rhonchi, wheezing, or rubs; normal respiratory effort, no intercostal retractions  HEART: Regular rate and rhythm; No murmurs, rubs, or gallops  ABDOMEN: Soft, Nontender, Nondistended; Bowel sounds present; No HSM  MUSCULOSKELETAL/EXTREMITIES:  2+ Peripheral Pulses, No clubbing, cyanosis, or peripheral edema; No digital cyanosis  PSYCH: Appropriate affect, Alert & Oriented x 3    LABS:                        12.1   10.63 )-----------( 387      ( 20 Mar 2021 06:00 )             37.2           138  |  104  |  41  ----------------------------<  136  4.4   |  26  |  2.13    Ca    8.2      20 Mar 2021 06:00    TPro  6.2  /  Alb  2.1  /  TBili  0.2  /  DBili  x   /  AST  71  /  ALT  79  /  AlkPhos  68       eGFR if Non African American: 23 mL/min/1.73M2 (21 @ 06:00)  eGFR if African American: 27 mL/min/1.73M2 (21 @ 06:00)      03- Chol 263 mg/dL LDL -- HDL 34 mg/dL Trig 396 mg/dL      POCT Blood Glucose.: 227 mg/dL (21 Mar 2021 07:29)  POCT Blood Glucose.: 282 mg/dL (20 Mar 2021 21:14)  POCT Blood Glucose.: 160 mg/dL (20 Mar 2021 18:15)  POCT Blood Glucose.: 86 mg/dL (20 Mar 2021 17:35)  POCT Blood Glucose.: 76 mg/dL (20 Mar 2021 16:56)  POCT Blood Glucose.: 55 mg/dL (20 Mar 2021 16:37)  POCT Blood Glucose.: 57 mg/dL (20 Mar 2021 16:36)  POCT Blood Glucose.: 182 mg/dL (20 Mar 2021 11:52)      Urinalysis Basic - ( 19 Mar 2021 04:04 )    Color: Light Yellow / Appearance: Clear / S.012 / pH: x  Gluc: x / Ketone: Negative  / Bili: Negative / Urobili: <2 mg/dL   Blood: x / Protein: 300 mg/dL / Nitrite: Negative   Leuk Esterase: Negative / RBC: 1 /HPF / WBC 1 /HPF   Sq Epi: x / Non Sq Epi: 1 /HPF / Bacteria: Negative      Care Discussed with Consultants/Other Providers: Yes  
Patient is a 70y old  Female who presents with a chief complaint of 01.1 Left Body Involvement (Right Brain) (21 Mar 2021 10:13)      SUBJECTIVE / OVERNIGHT EVENTS:  Pt seen and examined at bedside. No acute events overnight.  Pt denies cp, palpitations, sob, abd pain, N/V, fever, chills.    ROS:  All other review of systems negative    Allergies    No Known Allergies    Intolerances        MEDICATIONS  (STANDING):  aspirin  chewable 81 milliGRAM(s) Oral daily  atorvastatin 80 milliGRAM(s) Oral at bedtime  carvedilol 6.25 milliGRAM(s) Oral every 12 hours  dextrose 40% Gel 15 Gram(s) Oral once  dextrose 5%. 1000 milliLiter(s) (50 mL/Hr) IV Continuous <Continuous>  dextrose 5%. 1000 milliLiter(s) (100 mL/Hr) IV Continuous <Continuous>  dextrose 50% Injectable 25 Gram(s) IV Push once  dextrose 50% Injectable 12.5 Gram(s) IV Push once  dextrose 50% Injectable 25 Gram(s) IV Push once  fenofibrate Tablet 48 milliGRAM(s) Oral daily  glucagon  Injectable 1 milliGRAM(s) IntraMuscular once  heparin   Injectable 5000 Unit(s) SubCutaneous every 12 hours  insulin glargine Injectable (LANTUS) 18 Unit(s) SubCutaneous every morning  insulin lispro (ADMELOG) corrective regimen sliding scale   SubCutaneous three times a day before meals  insulin lispro (ADMELOG) corrective regimen sliding scale   SubCutaneous at bedtime  insulin lispro Injectable (ADMELOG) 7 Unit(s) SubCutaneous three times a day before meals  melatonin 3 milliGRAM(s) Oral at bedtime  NIFEdipine XL 90 milliGRAM(s) Oral daily  pantoprazole    Tablet 40 milliGRAM(s) Oral before breakfast    MEDICATIONS  (PRN):  acetaminophen   Tablet .. 650 milliGRAM(s) Oral every 6 hours PRN Temp greater or equal to 38C (100.4F), Mild Pain (1 - 3)  senna 2 Tablet(s) Oral at bedtime PRN Constipation      Vital Signs Last 24 Hrs  T(C): 37.2 (22 Mar 2021 08:09), Max: 37.2 (22 Mar 2021 08:09)  T(F): 98.9 (22 Mar 2021 08:09), Max: 98.9 (22 Mar 2021 08:09)  HR: 96 (22 Mar 2021 08:09) (94 - 100)  BP: 175/60 (22 Mar 2021 08:09) (159/74 - 175/60)  BP(mean): --  RR: 14 (22 Mar 2021 08:09) (14 - 16)  SpO2: 99% (22 Mar 2021 08:09) (97% - 100%)  CAPILLARY BLOOD GLUCOSE      POCT Blood Glucose.: 169 mg/dL (22 Mar 2021 07:42)  POCT Blood Glucose.: 115 mg/dL (21 Mar 2021 20:57)  POCT Blood Glucose.: 121 mg/dL (21 Mar 2021 16:55)  POCT Blood Glucose.: 139 mg/dL (21 Mar 2021 11:51)    I&O's Summary      PHYSICAL EXAM:  GENERAL: NAD, thin female  CHEST/LUNG: Clear to auscultation bilaterally; No wheeze  HEART: Regular rate and rhythm; No murmurs, rubs, or gallops  ABDOMEN: Soft, Nontender, Nondistended; Bowel sounds present  EXTREMITIES:  2+ Peripheral Pulses, No clubbing, cyanosis, or edema  NEUROLOGY: AAOx3, non-focal  PSYCH: calm    LABS:                        10.3   11.61 )-----------( 350      ( 22 Mar 2021 05:45 )             31.5     03-22    138  |  103  |  52<H>  ----------------------------<  189<H>  4.3   |  26  |  2.80<H>    Ca    8.2<L>      22 Mar 2021 05:45    TPro  5.9<L>  /  Alb  2.1<L>  /  TBili  0.2  /  DBili  x   /  AST  24  /  ALT  44  /  AlkPhos  58  03-22              RADIOLOGY & ADDITIONAL TESTS:  Results Reviewed:   Imaging Personally Reviewed:  Electrocardiogram Personally Reviewed:    COORDINATION OF CARE:  Care Discussed with Consultants/Other Providers [Y/N]:  Prior or Outpatient Records Reviewed [Y/N]:
Patient is a 70y old  Female who presents with a chief complaint of 01.1 Left Body Involvement (Right Brain) (22 Mar 2021 10:00)      SUBJECTIVE / OVERNIGHT EVENTS:  Pt seen and examined at bedside. No acute events overnight.  Pt denies cp, palpitations, sob, abd pain, N/V, fever, chills.    ROS:  All other review of systems negative    Allergies    No Known Allergies    Intolerances        MEDICATIONS  (STANDING):  aspirin  chewable 81 milliGRAM(s) Oral daily  atorvastatin 80 milliGRAM(s) Oral at bedtime  carvedilol 6.25 milliGRAM(s) Oral every 12 hours  dextrose 40% Gel 15 Gram(s) Oral once  dextrose 5%. 1000 milliLiter(s) (100 mL/Hr) IV Continuous <Continuous>  dextrose 5%. 1000 milliLiter(s) (50 mL/Hr) IV Continuous <Continuous>  dextrose 50% Injectable 25 Gram(s) IV Push once  dextrose 50% Injectable 12.5 Gram(s) IV Push once  dextrose 50% Injectable 25 Gram(s) IV Push once  fenofibrate Tablet 48 milliGRAM(s) Oral daily  glucagon  Injectable 1 milliGRAM(s) IntraMuscular once  heparin   Injectable 5000 Unit(s) SubCutaneous every 12 hours  insulin glargine Injectable (LANTUS) 18 Unit(s) SubCutaneous every morning  insulin lispro (ADMELOG) corrective regimen sliding scale   SubCutaneous three times a day before meals  insulin lispro (ADMELOG) corrective regimen sliding scale   SubCutaneous at bedtime  insulin lispro Injectable (ADMELOG) 7 Unit(s) SubCutaneous three times a day before meals  melatonin 3 milliGRAM(s) Oral at bedtime  NIFEdipine XL 90 milliGRAM(s) Oral daily  pantoprazole    Tablet 40 milliGRAM(s) Oral before breakfast    MEDICATIONS  (PRN):  acetaminophen   Tablet .. 650 milliGRAM(s) Oral every 6 hours PRN Temp greater or equal to 38C (100.4F), Mild Pain (1 - 3)  senna 2 Tablet(s) Oral at bedtime PRN Constipation      Vital Signs Last 24 Hrs  T(C): 36.7 (23 Mar 2021 07:14), Max: 37.1 (22 Mar 2021 20:37)  T(F): 98 (23 Mar 2021 07:14), Max: 98.8 (22 Mar 2021 20:37)  HR: 91 (23 Mar 2021 07:14) (87 - 101)  BP: 133/74 (23 Mar 2021 07:14) (133/74 - 162/85)  BP(mean): --  RR: 15 (23 Mar 2021 07:14) (14 - 15)  SpO2: 100% (23 Mar 2021 07:14) (96% - 100%)  CAPILLARY BLOOD GLUCOSE      POCT Blood Glucose.: 140 mg/dL (23 Mar 2021 07:55)  POCT Blood Glucose.: 217 mg/dL (22 Mar 2021 21:12)  POCT Blood Glucose.: 91 mg/dL (22 Mar 2021 16:43)  POCT Blood Glucose.: 120 mg/dL (22 Mar 2021 12:14)    I&O's Summary      PHYSICAL EXAM:  GENERAL: NAD, thin female  HEAD:  Atraumatic, Normocephalic  CHEST/LUNG: Clear to auscultation bilaterally; No wheeze, nonlabored breathing  HEART: Regular rate and rhythm; No murmurs, rubs, or gallops  ABDOMEN: Soft, Nontender, Nondistended; Bowel sounds present  EXTREMITIES:  2+ Peripheral Pulses, No clubbing, cyanosis, or edema  NEUROLOGY: AAOx3, non-focal  PSYCH: calm, appropriate mood    LABS:                        10.3   11.61 )-----------( 350      ( 22 Mar 2021 05:45 )             31.5     03-22    138  |  103  |  52<H>  ----------------------------<  189<H>  4.3   |  26  |  2.80<H>    Ca    8.2<L>      22 Mar 2021 05:45    TPro  5.9<L>  /  Alb  2.1<L>  /  TBili  0.2  /  DBili  x   /  AST  24  /  ALT  44  /  AlkPhos  58  03-22              RADIOLOGY & ADDITIONAL TESTS:  Results Reviewed:   Imaging Personally Reviewed:  Electrocardiogram Personally Reviewed:    COORDINATION OF CARE:  Care Discussed with Consultants/Other Providers [Y/N]:  Prior or Outpatient Records Reviewed [Y/N]:
Patient is a 70y old  Female who presents with a chief complaint of 01.1 Left Body Involvement (Right Brain) (23 Mar 2021 09:02)    69 y/o woman with pmhx of CAD s/p stent 6 years ago, Dm2, ?CKD, HTN who presents to the ER with 1-2 days of generalized weakness.  Reportedly per her son she had fallen and was slightly confused but with no LOC or head strike. She was found to have slurred speech at some point as well. She denies any headache or vision changes. Last normal per son around 10 PM friday. in the ER pt was given asa, ceftriaxone and 2L NS.   CT head revealed acute cva involving R internal capsule, R corona radiata.   Medically stable for transfer to [facility] on 3/19/2021. Patient seen and examined at bedside, no complaints.  (19 Mar 2021 17:06)      PAST MEDICAL & SURGICAL HISTORY:  Hypertension  DM (diabetes mellitus)  CAD (coronary artery disease)  No significant past surgical history    Allergies  No Known Allergies  Intolerances    TODAY'S SUBJECTIVE & REVIEW OF SYMPTOMS:  Patient seen and evaluated this morning. No acute events overnight or over the weekend. Participating well in therapy. Pain is well controlled. Denies chest pain, SOB, nausea, vomiting, constipation or diarrhea. Slept very well last night.     PHYSICAL EXAM  Vital Signs Last 24 Hrs  T(C): 36.3 (05 Apr 2021 07:43), Max: 36.4 (04 Apr 2021 20:39)  T(F): 97.3 (05 Apr 2021 07:43), Max: 97.5 (04 Apr 2021 20:39)  HR: 86 (05 Apr 2021 07:43) (72 - 87)  BP: 145/72 (05 Apr 2021 07:43) (134/74 - 168/78)  BP(mean): --  RR: 14 (05 Apr 2021 07:43) (14 - 14)  SpO2: 95% (05 Apr 2021 07:43) (95% - 96%)  ----------------------------------------------------------------------------------------  PHYSICAL EXAM  Constitutional - NAD, Comfortable  HEENT - NCAT, EOMI  Neck - Supple, No limited ROM  Chest - Breathing comfortably, No wheezing  Cardiovascular - S1S2   Abdomen - Soft   Extremities - No C/C/E, No calf tenderness   Neurologic Exam -                    Cognitive - Awake, Alert, AAO to self, place, date, year, situation     Communication - Fluent, No dysarthria     Cranial Nerves - slight left facial droop     Motor - No focal deficits                    LEFT    UE - ShAB 4/5, EF 4/5, EE 4/5, WE 4/5,  4/5                    RIGHT UE - ShAB 5/5, EF 5/5, EE 5/5, WE 5/5,  5/5                    LEFT    LE - HF 4/5, KE 4/5, DF 4/5, PF 4/5                    RIGHT LE - HF 5/5, KE 5/5, DF 5/5, PF 5/5        Sensory - Intact to LT     Reflexes - DTR Intact     Coordination - FTN intact on right; slight difficulty on left     OculoVestibular - No saccades, No nystagmus, VOR      Psychiatric - Mood stable, Affect WNL                        RECENT LABS                        9.1    10.41 )-----------( 467      ( 05 Apr 2021 05:30 )             27.6     04-05    141  |  108  |  66<H>  ----------------------------<  123<H>  4.4   |  22  |  2.62<H>    Ca    8.9      05 Apr 2021 05:30    TPro  6.1  /  Alb  2.3<L>  /  TBili  <0.1<L>  /  DBili  x   /  AST  22  /  ALT  28  /  AlkPhos  58  04-05      MEDICATIONS  (STANDING):  aspirin  chewable 81 milliGRAM(s) Oral daily  atorvastatin 80 milliGRAM(s) Oral at bedtime  carvedilol 12.5 milliGRAM(s) Oral every 12 hours  dextrose 40% Gel 15 Gram(s) Oral once  dextrose 5%. 1000 milliLiter(s) (50 mL/Hr) IV Continuous <Continuous>  dextrose 5%. 1000 milliLiter(s) (100 mL/Hr) IV Continuous <Continuous>  dextrose 50% Injectable 25 Gram(s) IV Push once  dextrose 50% Injectable 12.5 Gram(s) IV Push once  dextrose 50% Injectable 25 Gram(s) IV Push once  glucagon  Injectable 1 milliGRAM(s) IntraMuscular once  heparin   Injectable 5000 Unit(s) SubCutaneous every 12 hours  insulin glargine Injectable (LANTUS) 10 Unit(s) SubCutaneous every morning  insulin glargine Injectable (LANTUS) 10 Unit(s) SubCutaneous at bedtime  insulin lispro (ADMELOG) corrective regimen sliding scale   SubCutaneous three times a day before meals  insulin lispro (ADMELOG) corrective regimen sliding scale   SubCutaneous at bedtime  insulin lispro Injectable (ADMELOG) 5 Unit(s) SubCutaneous three times a day before meals  NIFEdipine XL 90 milliGRAM(s) Oral daily  traZODone 25 milliGRAM(s) Oral <User Schedule>    MEDICATIONS  (PRN):  acetaminophen   Tablet .. 650 milliGRAM(s) Oral every 6 hours PRN Temp greater or equal to 38C (100.4F), Mild Pain (1 - 3)  senna 2 Tablet(s) Oral at bedtime PRN Constipation      IMPRESSION AND PLAN:  69 y/o woman with pmhx of CAD s/p stent 6 years ago, Dm2, ?CKD, HTN, found to have R internal capsule, R corona radiata. Now with left hemiparesis, dysphasia.    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 GN serologies previously NEG. Eosinophilia raises concern for ATIN. 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  FU with Endocrinology upon discharge    #Pain control  - Tylenol PRN    #Insomnia  - increase melatonin from 3mg to 6mg and then 9 mg. Didn't help sleep. Plan to changed melatonin to 3 mg and start trazodone 25 mg (3/26)    #GI/Bowel Mgmt   - Continent c/w Senna    #Bladder management  - Continent,     #DVT  - Heparin  - SCDs, TEDs     #Skin:  -No active issues at this time    FEN   - Diet - Dysphagia 2 Mechanical Soft-Thin Liquids; Consistent Carb/DASH    - Dysphagia  SLP - evaluation and treatment    Precautions / PROPHYLAXIS:   - Falls, Cardiac  - ortho: Weight bearing status: WBAT   - Lungs: Aspiration, Incentive Spirometer   - Pressure injury/Skin: Turn Q2hrs while in bed, OOB to Chair, PT/OT/SLP    Dispo: IDT Round 3/23/21  - Lives in private home with , 4 TRESSA and 10 steps inside  - Supervision for eating, grooming, Min A for lower body dressing and toilet transfers  - Goal for Mod I for self care and supervision for shower transfers  - SLP - reduced awareness deficits, mild deficits comprehension  - Tentative DC date 4/6/21 Home with Home Care    Followups:  Libman, Richard B (MD)  Neurology; Vascular Neurology  [address], [address]  [place] [address]  Phone: [phone]  Fax: [phone]  Follow Up Time:     Lillian Koch)  [facility]  [address] [place] [address]  Phone: [phone]  Fax: [phone]      
Patient is a 70y old  Female who presents with a chief complaint of 01.1 Left Body Involvement (Right Brain) (23 Mar 2021 11:46)      SUBJECTIVE / OVERNIGHT EVENTS:  Pt seen and examined at bedside. No acute events overnight.  Pt denies cp, palpitations, sob, abd pain, N/V, fever, chills.    ROS:  All other review of systems negative    Allergies    No Known Allergies    Intolerances        MEDICATIONS  (STANDING):  aspirin  chewable 81 milliGRAM(s) Oral daily  atorvastatin 80 milliGRAM(s) Oral at bedtime  carvedilol 6.25 milliGRAM(s) Oral every 12 hours  dextrose 40% Gel 15 Gram(s) Oral once  dextrose 5%. 1000 milliLiter(s) (50 mL/Hr) IV Continuous <Continuous>  dextrose 5%. 1000 milliLiter(s) (100 mL/Hr) IV Continuous <Continuous>  dextrose 50% Injectable 25 Gram(s) IV Push once  dextrose 50% Injectable 12.5 Gram(s) IV Push once  dextrose 50% Injectable 25 Gram(s) IV Push once  fenofibrate Tablet 48 milliGRAM(s) Oral daily  glucagon  Injectable 1 milliGRAM(s) IntraMuscular once  heparin   Injectable 5000 Unit(s) SubCutaneous every 12 hours  insulin glargine Injectable (LANTUS) 18 Unit(s) SubCutaneous every morning  insulin lispro (ADMELOG) corrective regimen sliding scale   SubCutaneous three times a day before meals  insulin lispro (ADMELOG) corrective regimen sliding scale   SubCutaneous at bedtime  insulin lispro Injectable (ADMELOG) 7 Unit(s) SubCutaneous before breakfast  insulin lispro Injectable (ADMELOG) 5 Unit(s) SubCutaneous before lunch  insulin lispro Injectable (ADMELOG) 7 Unit(s) SubCutaneous before dinner  NIFEdipine XL 90 milliGRAM(s) Oral daily  pantoprazole    Tablet 40 milliGRAM(s) Oral before breakfast    MEDICATIONS  (PRN):  acetaminophen   Tablet .. 650 milliGRAM(s) Oral every 6 hours PRN Temp greater or equal to 38C (100.4F), Mild Pain (1 - 3)  melatonin 9 milliGRAM(s) Oral at bedtime PRN Insomnia  senna 2 Tablet(s) Oral at bedtime PRN Constipation      Vital Signs Last 24 Hrs  T(C): 36.9 (24 Mar 2021 08:30), Max: 36.9 (23 Mar 2021 20:01)  T(F): 98.5 (24 Mar 2021 08:30), Max: 98.5 (24 Mar 2021 08:30)  HR: 87 (24 Mar 2021 08:30) (87 - 92)  BP: 153/84 (24 Mar 2021 08:30) (141/71 - 172/91)  BP(mean): --  RR: 16 (24 Mar 2021 08:30) (16 - 16)  SpO2: 100% (24 Mar 2021 08:30) (99% - 100%)  CAPILLARY BLOOD GLUCOSE      POCT Blood Glucose.: 147 mg/dL (24 Mar 2021 07:39)  POCT Blood Glucose.: 192 mg/dL (23 Mar 2021 22:14)  POCT Blood Glucose.: 82 mg/dL (23 Mar 2021 17:04)  POCT Blood Glucose.: 159 mg/dL (23 Mar 2021 11:33)    I&O's Summary      PHYSICAL EXAM:  GENERAL: NAD, thin female  HEAD:  Atraumatic, Normocephalic  CHEST/LUNG: Clear to auscultation bilaterally; No wheeze, nonlabored breathing  HEART: Regular rate and rhythm; No murmurs, rubs, or gallops  ABDOMEN: Soft, Nontender, Nondistended; Bowel sounds present  EXTREMITIES:  2+ Peripheral Pulses, No clubbing, cyanosis, or edema  NEUROLOGY: AAOx3, non-focal  PSYCH: calm, appropriate mood    LABS:                    RADIOLOGY & ADDITIONAL TESTS:  Results Reviewed:   Imaging Personally Reviewed:  Electrocardiogram Personally Reviewed:    COORDINATION OF CARE:  Care Discussed with Consultants/Other Providers [Y/N]:  Prior or Outpatient Records Reviewed [Y/N]:
Patient is a 70y old  Female who presents with a chief complaint of 01.1 Left Body Involvement (Right Brain) (29 Mar 2021 21:03)      Patient seen and examined at bedside. No overnight events.    ALLERGIES:  No Known Allergies    MEDICATIONS  (STANDING):  aspirin  chewable 81 milliGRAM(s) Oral daily  atorvastatin 80 milliGRAM(s) Oral at bedtime  carvedilol 6.25 milliGRAM(s) Oral every 12 hours  dextrose 40% Gel 15 Gram(s) Oral once  dextrose 5%. 1000 milliLiter(s) (50 mL/Hr) IV Continuous <Continuous>  dextrose 5%. 1000 milliLiter(s) (100 mL/Hr) IV Continuous <Continuous>  dextrose 50% Injectable 25 Gram(s) IV Push once  dextrose 50% Injectable 12.5 Gram(s) IV Push once  dextrose 50% Injectable 25 Gram(s) IV Push once  glucagon  Injectable 1 milliGRAM(s) IntraMuscular once  heparin   Injectable 5000 Unit(s) SubCutaneous every 12 hours  insulin glargine Injectable (LANTUS) 10 Unit(s) SubCutaneous every morning  insulin glargine Injectable (LANTUS) 10 Unit(s) SubCutaneous at bedtime  insulin lispro (ADMELOG) corrective regimen sliding scale   SubCutaneous three times a day before meals  insulin lispro (ADMELOG) corrective regimen sliding scale   SubCutaneous at bedtime  insulin lispro Injectable (ADMELOG) 7 Unit(s) SubCutaneous before breakfast  insulin lispro Injectable (ADMELOG) 5 Unit(s) SubCutaneous before lunch  insulin lispro Injectable (ADMELOG) 7 Unit(s) SubCutaneous before dinner  NIFEdipine XL 90 milliGRAM(s) Oral daily  sodium chloride 0.9%. 1000 milliLiter(s) (75 mL/Hr) IV Continuous <Continuous>  traZODone 25 milliGRAM(s) Oral <User Schedule>    MEDICATIONS  (PRN):  acetaminophen   Tablet .. 650 milliGRAM(s) Oral every 6 hours PRN Temp greater or equal to 38C (100.4F), Mild Pain (1 - 3)  senna 2 Tablet(s) Oral at bedtime PRN Constipation    Vital Signs Last 24 Hrs  T(F): 97.8 (30 Mar 2021 08:55), Max: 98.7 (29 Mar 2021 20:19)  HR: 88 (30 Mar 2021 08:55) (77 - 88)  BP: 126/69 (30 Mar 2021 08:55) (126/69 - 161/87)  RR: 14 (30 Mar 2021 08:55) (14 - 16)  SpO2: 100% (30 Mar 2021 08:55) (94% - 100%)  I&O's Summary    29 Mar 2021 07:01  -  30 Mar 2021 07:00  --------------------------------------------------------  IN: 1725 mL / OUT: 0 mL / NET: 1725 mL          PHYSICAL EXAM:  General: NAD, A/O x 3  ENT: MMM, no scleral icterus  Neck: Supple, No JVD  Lungs: Clear to auscultation bilaterally, no wheezes, rales, rhonchi  Cardio: RRR, S1/S2, No murmurs  Abdomen: Soft, Nontender, Nondistended; Bowel sounds present  Extremities: No calf tenderness, No pitting edema    LABS:                        9.3    10.58 )-----------( 399      ( 29 Mar 2021 05:00 )             28.2       03-29    139  |  107  |  72  ----------------------------<  259  4.2   |  23  |  3.47    Ca    8.4      29 Mar 2021 05:00    TPro  5.9  /  Alb  2.1  /  TBili  0.1  /  DBili  x   /  AST  16  /  ALT  25  /  AlkPhos  56  03-29     eGFR if Non African American: 13 mL/min/1.73M2 (03-29-21 @ 05:00)  eGFR if African American: 15 mL/min/1.73M2 (03-29-21 @ 05:00)             03-14 Chol 263 mg/dL LDL -- HDL 34 mg/dL Trig 396 mg/dL              POCT Blood Glucose.: 163 mg/dL (30 Mar 2021 07:47)  POCT Blood Glucose.: 91 mg/dL (29 Mar 2021 21:29)  POCT Blood Glucose.: 115 mg/dL (29 Mar 2021 16:59)  POCT Blood Glucose.: 84 mg/dL (29 Mar 2021 12:04)            RADIOLOGY & ADDITIONAL TESTS:    Care Discussed with Consultants/Other Providers: Dr. Daly (physiatry)  
Patient is a 70y old  Female who presents with a chief complaint of Left Body Involvement (Right Brain) (26 Mar 2021 19:13)    No overnight events noted.  Patient seen and examined at bedside.  No new/acute symptoms reported.    ALLERGIES:  No Known Allergies    MEDICATIONS  (STANDING):  aspirin  chewable 81 milliGRAM(s) Oral daily  atorvastatin 80 milliGRAM(s) Oral at bedtime  carvedilol 6.25 milliGRAM(s) Oral every 12 hours  dextrose 40% Gel 15 Gram(s) Oral once  dextrose 5%. 1000 milliLiter(s) (50 mL/Hr) IV Continuous <Continuous>  dextrose 5%. 1000 milliLiter(s) (100 mL/Hr) IV Continuous <Continuous>  dextrose 50% Injectable 12.5 Gram(s) IV Push once  dextrose 50% Injectable 25 Gram(s) IV Push once  dextrose 50% Injectable 25 Gram(s) IV Push once  fenofibrate Tablet 48 milliGRAM(s) Oral daily  glucagon  Injectable 1 milliGRAM(s) IntraMuscular once  heparin   Injectable 5000 Unit(s) SubCutaneous every 12 hours  insulin glargine Injectable (LANTUS) 18 Unit(s) SubCutaneous every morning  insulin glargine Injectable (LANTUS) 3 Unit(s) SubCutaneous at bedtime  insulin lispro (ADMELOG) corrective regimen sliding scale   SubCutaneous three times a day before meals  insulin lispro (ADMELOG) corrective regimen sliding scale   SubCutaneous at bedtime  insulin lispro Injectable (ADMELOG) 7 Unit(s) SubCutaneous before breakfast  insulin lispro Injectable (ADMELOG) 5 Unit(s) SubCutaneous before lunch  insulin lispro Injectable (ADMELOG) 7 Unit(s) SubCutaneous before dinner  melatonin 3 milliGRAM(s) Oral at bedtime  NIFEdipine XL 90 milliGRAM(s) Oral daily  pantoprazole    Tablet 40 milliGRAM(s) Oral before breakfast  sodium chloride 0.45%. 1000 milliLiter(s) (75 mL/Hr) IV Continuous <Continuous>  traZODone 25 milliGRAM(s) Oral at bedtime    MEDICATIONS  (PRN):  acetaminophen   Tablet .. 650 milliGRAM(s) Oral every 6 hours PRN Temp greater or equal to 38C (100.4F), Mild Pain (1 - 3)  senna 2 Tablet(s) Oral at bedtime PRN Constipation    Vital Signs Last 24 Hrs  T(F): 98.6 (27 Mar 2021 07:30), Max: 98.9 (26 Mar 2021 20:35)  HR: 87 (27 Mar 2021 07:30) (87 - 96)  BP: 163/84 (27 Mar 2021 07:30) (155/89 - 163/84)  RR: 16 (27 Mar 2021 07:30) (14 - 16)  SpO2: 100% (27 Mar 2021 07:30) (96% - 100%)    PHYSICAL EXAM:  GENERAL: NAD, thin female  HEAD:  Atraumatic, Normocephalic  CHEST/LUNG: Clear to auscultation bilaterally; No wheeze, nonlabored breathing  HEART: Regular rate and rhythm; No murmurs, rubs, or gallops  ABDOMEN: Soft, Nontender, Nondistended; Bowel sounds present  EXTREMITIES:  2+ Peripheral Pulses, No clubbing, cyanosis, or edema  NEUROLOGY: AAOx3, non-focal  PSYCH: calm, appropriate mood    LABS:                        10.9   11.10 )-----------( 410      ( 25 Mar 2021 07:37 )             34.0           139  |  106  |  60  ----------------------------<  260  4.1   |  24  |  2.67    Ca    8.3      27 Mar 2021 07:33    TPro  5.5  /  Alb  2.6  /  TBili  0.2  /  DBili  x   /  AST  24  /  ALT  33  /  AlkPhos  63  03-     eGFR if Non African American: 18 mL/min/1.73M2 (21 @ 07:33)  eGFR if African American: 20 mL/min/1.73M2 (21 @ 07:33)    03- Chol 263 mg/dL LDL -- HDL 34 mg/dL Trig 396 mg/dL    POCT Blood Glucose.: 172 mg/dL (27 Mar 2021 11:52)  POCT Blood Glucose.: 274 mg/dL (27 Mar 2021 07:45)  POCT Blood Glucose.: 96 mg/dL (26 Mar 2021 21:24)  POCT Blood Glucose.: 155 mg/dL (26 Mar 2021 17:02)      Urinalysis Basic - ( 25 Mar 2021 20:50 )    Color: Yellow / Appearance: Clear / S.010 / pH: x  Gluc: x / Ketone: Negative  / Bili: Negative / Urobili: Negative   Blood: x / Protein: 500 mg/dL / Nitrite: Negative   Leuk Esterase: Negative / RBC: 0-4 /HPF / WBC 0-2 /HPF   Sq Epi: x / Non Sq Epi: Neg.-Few / Bacteria: Negative /HPF    
Medicine Progress Note    Patient is a 70y old  Female who presents with a chief complaint of 01.1 Left Body Involvement (Right Brain) (31 Mar 2021 14:14)      SUBJECTIVE / OVERNIGHT EVENTS:  seen and examined  Chart reviewed  No overnight events  Limb weakness improving with therapy  BM+  No pain      ADDITIONAL REVIEW OF SYSTEMS:  no fever/chills/CP/sob/palpitation/dizziness/ abd pain/nausea/vomiting/diarrhea/constipation/headaches. all other ROS neg    MEDICATIONS  (STANDING):  aspirin  chewable 81 milliGRAM(s) Oral daily  atorvastatin 80 milliGRAM(s) Oral at bedtime  carvedilol 12.5 milliGRAM(s) Oral every 12 hours  dextrose 40% Gel 15 Gram(s) Oral once  dextrose 5%. 1000 milliLiter(s) (50 mL/Hr) IV Continuous <Continuous>  dextrose 5%. 1000 milliLiter(s) (100 mL/Hr) IV Continuous <Continuous>  dextrose 50% Injectable 25 Gram(s) IV Push once  dextrose 50% Injectable 12.5 Gram(s) IV Push once  dextrose 50% Injectable 25 Gram(s) IV Push once  glucagon  Injectable 1 milliGRAM(s) IntraMuscular once  heparin   Injectable 5000 Unit(s) SubCutaneous every 12 hours  insulin glargine Injectable (LANTUS) 10 Unit(s) SubCutaneous every morning  insulin glargine Injectable (LANTUS) 10 Unit(s) SubCutaneous at bedtime  insulin lispro (ADMELOG) corrective regimen sliding scale   SubCutaneous three times a day before meals  insulin lispro (ADMELOG) corrective regimen sliding scale   SubCutaneous at bedtime  insulin lispro Injectable (ADMELOG) 7 Unit(s) SubCutaneous before breakfast  insulin lispro Injectable (ADMELOG) 5 Unit(s) SubCutaneous before lunch  insulin lispro Injectable (ADMELOG) 7 Unit(s) SubCutaneous before dinner  NIFEdipine XL 90 milliGRAM(s) Oral daily  traZODone 25 milliGRAM(s) Oral <User Schedule>    MEDICATIONS  (PRN):  acetaminophen   Tablet .. 650 milliGRAM(s) Oral every 6 hours PRN Temp greater or equal to 38C (100.4F), Mild Pain (1 - 3)  senna 2 Tablet(s) Oral at bedtime PRN Constipation      PHYSICAL EXAM:    Vital Signs Last 24 Hrs  T(C): 36.9 (02 Apr 2021 22:44), Max: 36.9 (02 Apr 2021 22:44)  T(F): 98.4 (02 Apr 2021 22:44), Max: 98.4 (02 Apr 2021 22:44)  HR: 89 (03 Apr 2021 05:38) (89 - 92)  BP: 179/91 (03 Apr 2021 05:38) (163/78 - 179/91)  BP(mean): --  RR: 16 (02 Apr 2021 22:44) (16 - 16)  SpO2: 99% (02 Apr 2021 22:44) (99% - 99%)    CAPILLARY BLOOD GLUCOSE      POCT Blood Glucose.: 157 mg/dL (02 Apr 2021 22:41)  POCT Blood Glucose.: 97 mg/dL (02 Apr 2021 17:08)  POCT Blood Glucose.: 155 mg/dL (02 Apr 2021 12:03)  POCT Blood Glucose.: 133 mg/dL (02 Apr 2021 08:00)        GENERAL: Not in distress. Alert    HEENT:  MMM  NECK: Supple.    CARDIOVASCULAR: RRR S1, S2. No murmur/rubs/gallop  LUNGS: BLAE+, no rales, no wheezing, no rhonchi.    ABDOMEN: ND. Soft,  NT, no guarding / rebound / rigidity.   EXTREMITIES: no edema. no leg or calf TP.  SKIN: warm and dry  NEUROLOGIC: AAO*3. left sided weakness  PSYCHIATRIC: Calm.  No agitation.    LABS:             04-03    142  |  110<H>  |  75<H>  ----------------------------<  86  4.1   |  23  |  2.81<H>    Ca    8.9      03 Apr 2021 05:30              COVID-19 PCR: NotDetec (30 Mar 2021 22:00)  COVID-19 PCR: NotDetec (19 Mar 2021 11:43)  COVID-19 PCR: NotDetec (14 Mar 2021 00:37)      RADIOLOGY & ADDITIONAL TESTS:  Imaging from Last 24 Hours:    Electrocardiogram/QTc Interval:    COORDINATION OF CARE:  Care Discussed with Consultants/Other Providers:

## 2021-04-06 NOTE — PROGRESS NOTE ADULT - ASSESSMENT
70F with PMH CAD s/p stent (6 years ago), Type 2 DM, CKD Stage 4, HTN admitted to [facility] after acute CVA    #Acute CVA  -Continue comprehensive rehab program  -Continue ASA 81mg daily and lipitor 80mg q hs  - fall precautions    #DM Type II  -HA1C 13.9 (3/15)  - on Lantus 10 units qAM, Lantus 10 units qHS, 7 units pre-meal breakfast and dinner, 5 units pre-meal at lunch,   - BS low in evening. Changed admelog to 5 unit TIDAC. 4/3  - Target BS in hospital setting 100-180 to avoid hypoglycemia  - low dose insulin sliding scale  -Given uncontrolled A1C, indication for subq insulin on discharge  -Hypoglycemia protocol, accu-cheks    #BROCK/ATN on CKD IV  -Fluctuating Cr with baseline Cr of 2-2.1  -Avoid nephrotoxic agents  -monitor BMP  -Nephrology recommendations appreciated. Renal f/u OP    #CAD s/p stent  #HTN: uncontrolled  -Continue ASA, statin, fenofibrate  -Continue Nifedipine 90mg daily  - increased Carvedilol 6.25mg BID to 12.5 BID on 4/1  -Monitor vitals  - DASH/TLC  - monitor BP and adjust dose    #DVT ppx - heparin

## 2021-04-06 NOTE — DISCHARGE NOTE NURSING/CASE MANAGEMENT/SOCIAL WORK - PATIENT PORTAL LINK FT
You can access the FollowMyHealth Patient Portal offered by [facility] by registering at the following website: [email or web address]. By joining [employer]’s FollowMyHealth portal, you will also be able to view your health information using other applications (apps) compatible with our system.

## 2021-04-07 PROBLEM — Z00.00 ENCOUNTER FOR PREVENTIVE HEALTH EXAMINATION: Status: ACTIVE | Noted: 2021-04-07

## 2021-04-08 RX ORDER — INSULIN GLARGINE 100 [IU]/ML
10 INJECTION, SOLUTION SUBCUTANEOUS
Qty: 1 | Refills: 0
Start: 2021-04-08 | End: 2021-05-07

## 2021-04-13 ENCOUNTER — APPOINTMENT (OUTPATIENT)
Dept: NEUROLOGY | Facility: CLINIC | Age: 71
End: 2021-04-13
Payer: COMMERCIAL

## 2021-04-13 ENCOUNTER — TRANSCRIPTION ENCOUNTER (OUTPATIENT)
Age: 71
End: 2021-04-13

## 2021-04-13 VITALS
DIASTOLIC BLOOD PRESSURE: 63 MMHG | SYSTOLIC BLOOD PRESSURE: 116 MMHG | HEIGHT: 60 IN | BODY MASS INDEX: 23.56 KG/M2 | WEIGHT: 120 LBS | HEART RATE: 94 BPM

## 2021-04-13 VITALS — TEMPERATURE: 97.2 F

## 2021-04-13 DIAGNOSIS — I63.9 CEREBRAL INFARCTION, UNSPECIFIED: ICD-10-CM

## 2021-04-13 PROCEDURE — 99215 OFFICE O/P EST HI 40 MIN: CPT

## 2021-04-13 PROCEDURE — 99072 ADDL SUPL MATRL&STAF TM PHE: CPT

## 2021-04-13 RX ORDER — ATORVASTATIN CALCIUM 80 MG/1
80 TABLET, FILM COATED ORAL
Refills: 0 | Status: ACTIVE | COMMUNITY

## 2021-04-13 RX ORDER — NIFEDIPINE 90 MG/1
90 TABLET, EXTENDED RELEASE ORAL
Refills: 0 | Status: ACTIVE | COMMUNITY

## 2021-04-13 RX ORDER — INSULIN GLARGINE 100 [IU]/ML
INJECTION, SOLUTION SUBCUTANEOUS
Refills: 0 | Status: ACTIVE | COMMUNITY

## 2021-04-13 RX ORDER — CLOPIDOGREL BISULFATE 75 MG/1
75 TABLET, FILM COATED ORAL DAILY
Qty: 30 | Refills: 2 | Status: ACTIVE | COMMUNITY
Start: 2021-04-13

## 2021-04-13 RX ORDER — CARVEDILOL 12.5 MG/1
12.5 TABLET, FILM COATED ORAL
Refills: 0 | Status: ACTIVE | COMMUNITY

## 2021-04-13 RX ORDER — ASPIRIN 81 MG/1
81 TABLET, COATED ORAL
Refills: 0 | Status: ACTIVE | COMMUNITY

## 2021-05-10 PROCEDURE — 92507 TX SP LANG VOICE COMM INDIV: CPT

## 2021-05-10 PROCEDURE — 84156 ASSAY OF PROTEIN URINE: CPT

## 2021-05-10 PROCEDURE — 86334 IMMUNOFIX E-PHORESIS SERUM: CPT

## 2021-05-10 PROCEDURE — 85025 COMPLETE CBC W/AUTO DIFF WBC: CPT

## 2021-05-10 PROCEDURE — 81001 URINALYSIS AUTO W/SCOPE: CPT

## 2021-05-10 PROCEDURE — 82962 GLUCOSE BLOOD TEST: CPT

## 2021-05-10 PROCEDURE — 84165 PROTEIN E-PHORESIS SERUM: CPT

## 2021-05-10 PROCEDURE — 82570 ASSAY OF URINE CREATININE: CPT

## 2021-05-10 PROCEDURE — 92523 SPEECH SOUND LANG COMPREHEN: CPT

## 2021-05-10 PROCEDURE — 86038 ANTINUCLEAR ANTIBODIES: CPT

## 2021-05-10 PROCEDURE — 92610 EVALUATE SWALLOWING FUNCTION: CPT

## 2021-05-10 PROCEDURE — 87205 SMEAR GRAM STAIN: CPT

## 2021-05-10 PROCEDURE — 97535 SELF CARE MNGMENT TRAINING: CPT

## 2021-05-10 PROCEDURE — 97163 PT EVAL HIGH COMPLEX 45 MIN: CPT

## 2021-05-10 PROCEDURE — 84155 ASSAY OF PROTEIN SERUM: CPT

## 2021-05-10 PROCEDURE — 80048 BASIC METABOLIC PNL TOTAL CA: CPT

## 2021-05-10 PROCEDURE — 97110 THERAPEUTIC EXERCISES: CPT

## 2021-05-10 PROCEDURE — 82550 ASSAY OF CK (CPK): CPT

## 2021-05-10 PROCEDURE — 36415 COLL VENOUS BLD VENIPUNCTURE: CPT

## 2021-05-10 PROCEDURE — 83516 IMMUNOASSAY NONANTIBODY: CPT

## 2021-05-10 PROCEDURE — 97112 NEUROMUSCULAR REEDUCATION: CPT

## 2021-05-10 PROCEDURE — U0005: CPT

## 2021-05-10 PROCEDURE — 84540 ASSAY OF URINE/UREA-N: CPT

## 2021-05-10 PROCEDURE — 86160 COMPLEMENT ANTIGEN: CPT

## 2021-05-10 PROCEDURE — 80053 COMPREHEN METABOLIC PANEL: CPT

## 2021-05-10 PROCEDURE — 86036 ANCA SCREEN EACH ANTIBODY: CPT

## 2021-05-10 PROCEDURE — 97116 GAIT TRAINING THERAPY: CPT

## 2021-05-10 PROCEDURE — 97530 THERAPEUTIC ACTIVITIES: CPT

## 2021-05-10 PROCEDURE — 84300 ASSAY OF URINE SODIUM: CPT

## 2021-05-10 PROCEDURE — U0003: CPT

## 2021-05-11 ENCOUNTER — APPOINTMENT (OUTPATIENT)
Dept: NEUROLOGY | Facility: CLINIC | Age: 71
End: 2021-05-11

## 2021-06-03 NOTE — PHYSICAL THERAPY INITIAL EVALUATION ADULT - ADDITIONAL COMMENTS
Group Therapy Documentation    PATIENT'S NAME: Monty Cox  MRN:   [id number]  :   1964  ACCT. NUMBER: [id number]  DATE OF SERVICE: 21  START TIME:  5:30 PM  END TIME:  7:30 PM  FACILITATOR(S): Hermelindo Mann LADC  TOPIC: BEH Group Therapy  Number of patients attending the group:  5  Group Length:  2 Hours    Group Therapy Type: Addiction    Summary of Group / Topics Discussed:    Recovery skills required to increase resiliency and recovery capital. Group topic to include: Importance of acceptance and vulnerability in recovery.    Telemedicine Visit: The patient's condition can be safely assessed and treated via synchronous audio and visual telemedicine encounter.      Reason for Telemedicine Visit: Services only offered telehealth    Originating Site (Patient Location): Patient's home    Distant Site (Provider Location): Provider Remote Setting    Consent:  The patient/guardian has verbally consented to: the potential risks and benefits of telemedicine (video visit) versus in person care; bill my insurance or make self-payment for services provided; and responsibility for payment of non-covered services.     Mode of Communication:  Video Conference via [employer]    As the provider I attest to compliance with applicable laws and regulations related to telemedicine.       Group Attendance:  Attended group session    Patient's response to the group topic/interactions:  cooperative with task    Patient appeared to be Engaged.        Client specific details:  Client reported no change in sobriety date.  Client shared a personal strength of of patience. Clt shared that he was grateful for  The weather we're having. Client identified recovery action he would take over the weekend of journaling.  This relates to clt Dim 4 and Dim 5 Tx plan goals. .Clt shared that his main Protective factor has been Healthy thinking, the ability to avoid ruminating on past regret and mistakes.   +right sided gaze   +left trunk lean in sitting

## 2021-08-03 ENCOUNTER — APPOINTMENT (OUTPATIENT)
Dept: NEUROLOGY | Facility: CLINIC | Age: 71
End: 2021-08-03

## 2022-08-18 NOTE — PATIENT PROFILE ADULT - NSPRESCRUSEDDRG_GEN_A_NUR
No patient comfortable today on NC  continue med mgmt per pulm   pt and families goal is pulm rehab if possible   full code

## 2022-10-17 ENCOUNTER — OUTPATIENT (OUTPATIENT)
Dept: OUTPATIENT SERVICES | Facility: HOSPITAL | Age: 72
LOS: 1 days | End: 2022-10-17
Payer: COMMERCIAL

## 2022-10-17 DIAGNOSIS — I25.10 ATHEROSCLEROTIC HEART DISEASE OF NATIVE CORONARY ARTERY WITHOUT ANGINA PECTORIS: ICD-10-CM

## 2022-10-17 PROCEDURE — A9502: CPT

## 2022-10-17 PROCEDURE — 78452 HT MUSCLE IMAGE SPECT MULT: CPT | Mod: 26,MH

## 2022-10-17 PROCEDURE — 78452 HT MUSCLE IMAGE SPECT MULT: CPT | Mod: MH

## 2022-10-17 PROCEDURE — 93017 CV STRESS TEST TRACING ONLY: CPT

## 2022-10-17 PROCEDURE — 93016 CV STRESS TEST SUPVJ ONLY: CPT

## 2022-10-17 PROCEDURE — 93018 CV STRESS TEST I&R ONLY: CPT

## 2024-03-06 NOTE — ED PROVIDER NOTE - CADM POA URETHRAL CATHETER
Called patient and left  with Central Scheduling phone number to schedule her mammogram. Also left our phone number for any comments, questions, or concerns.   
No

## 2025-03-03 ENCOUNTER — INPATIENT (INPATIENT)
Facility: HOSPITAL | Age: 75
LOS: 0 days | Discharge: HOME | End: 2025-03-04
Attending: INTERNAL MEDICINE | Admitting: INTERNAL MEDICINE
Payer: MEDICARE

## 2025-03-03 VITALS
RESPIRATION RATE: 16 BRPM | HEART RATE: 73 BPM | DIASTOLIC BLOOD PRESSURE: 51 MMHG | WEIGHT: 100.09 LBS | SYSTOLIC BLOOD PRESSURE: 165 MMHG | HEIGHT: 60 IN | TEMPERATURE: 98 F

## 2025-03-03 DIAGNOSIS — Z98.49 CATARACT EXTRACTION STATUS, UNSPECIFIED EYE: Chronic | ICD-10-CM

## 2025-03-03 DIAGNOSIS — N18.6 END STAGE RENAL DISEASE: ICD-10-CM

## 2025-03-03 DIAGNOSIS — I25.10 ATHEROSCLEROTIC HEART DISEASE OF NATIVE CORONARY ARTERY WITHOUT ANGINA PECTORIS: ICD-10-CM

## 2025-03-03 DIAGNOSIS — I77.0 ARTERIOVENOUS FISTULA, ACQUIRED: Chronic | ICD-10-CM

## 2025-03-03 LAB
ANION GAP SERPL CALC-SCNC: 17 MMOL/L — HIGH (ref 7–14)
BUN SERPL-MCNC: 74 MG/DL — HIGH (ref 7–23)
CALCIUM SERPL-MCNC: 9.4 MG/DL — SIGNIFICANT CHANGE UP (ref 8.4–10.5)
CHLORIDE SERPL-SCNC: 99 MMOL/L — SIGNIFICANT CHANGE UP (ref 98–107)
CO2 SERPL-SCNC: 21 MMOL/L — LOW (ref 22–31)
CREAT SERPL-MCNC: 5.1 MG/DL — HIGH (ref 0.5–1.3)
EGFR: 8 ML/MIN/1.73M2 — LOW
EGFR: 8 ML/MIN/1.73M2 — LOW
GLUCOSE SERPL-MCNC: 152 MG/DL — HIGH (ref 70–99)
HCT VFR BLD CALC: 41.2 % — SIGNIFICANT CHANGE UP (ref 34.5–45)
HGB BLD-MCNC: 13.3 G/DL — SIGNIFICANT CHANGE UP (ref 11.5–15.5)
MCHC RBC-ENTMCNC: 28.5 PG — SIGNIFICANT CHANGE UP (ref 27–34)
MCHC RBC-ENTMCNC: 32.3 G/DL — SIGNIFICANT CHANGE UP (ref 32–36)
MCV RBC AUTO: 88.4 FL — SIGNIFICANT CHANGE UP (ref 80–100)
NRBC # BLD AUTO: 0 K/UL — SIGNIFICANT CHANGE UP (ref 0–0)
NRBC # FLD: 0 K/UL — SIGNIFICANT CHANGE UP (ref 0–0)
NRBC BLD AUTO-RTO: 0 /100 WBCS — SIGNIFICANT CHANGE UP (ref 0–0)
PLATELET # BLD AUTO: 248 K/UL — SIGNIFICANT CHANGE UP (ref 150–400)
POTASSIUM SERPL-MCNC: 4.5 MMOL/L — SIGNIFICANT CHANGE UP (ref 3.5–5.3)
POTASSIUM SERPL-SCNC: 4.5 MMOL/L — SIGNIFICANT CHANGE UP (ref 3.5–5.3)
RBC # BLD: 4.66 M/UL — SIGNIFICANT CHANGE UP (ref 3.8–5.2)
RBC # FLD: 14.9 % — HIGH (ref 10.3–14.5)
SODIUM SERPL-SCNC: 137 MMOL/L — SIGNIFICANT CHANGE UP (ref 135–145)
WBC # BLD: 7.84 K/UL — SIGNIFICANT CHANGE UP (ref 3.8–10.5)
WBC # FLD AUTO: 7.84 K/UL — SIGNIFICANT CHANGE UP (ref 3.8–10.5)

## 2025-03-03 PROCEDURE — 93458 L HRT ARTERY/VENTRICLE ANGIO: CPT | Mod: 26,59

## 2025-03-03 PROCEDURE — 92928 PRQ TCAT PLMT NTRAC ST 1 LES: CPT | Mod: RC

## 2025-03-03 PROCEDURE — 93010 ELECTROCARDIOGRAM REPORT: CPT | Mod: 76

## 2025-03-03 PROCEDURE — 92972 PERQ TRLUML CORONRY LITHOTRP: CPT

## 2025-03-03 PROCEDURE — 92978 ENDOLUMINL IVUS OCT C 1ST: CPT | Mod: 26,LD

## 2025-03-03 PROCEDURE — 99152 MOD SED SAME PHYS/QHP 5/>YRS: CPT

## 2025-03-03 RX ORDER — AMLODIPINE BESYLATE 10 MG/1
1 TABLET ORAL
Refills: 0 | DISCHARGE

## 2025-03-03 RX ORDER — SODIUM CHLORIDE 9 G/1000ML
1000 INJECTION, SOLUTION INTRAVENOUS
Refills: 0 | Status: DISCONTINUED | OUTPATIENT
Start: 2025-03-03 | End: 2025-03-04

## 2025-03-03 RX ORDER — ASPIRIN 325 MG
81 TABLET ORAL DAILY
Refills: 0 | Status: DISCONTINUED | OUTPATIENT
Start: 2025-03-04 | End: 2025-03-04

## 2025-03-03 RX ORDER — DEXTROSE 50 % IN WATER 50 %
25 SYRINGE (ML) INTRAVENOUS ONCE
Refills: 0 | Status: DISCONTINUED | OUTPATIENT
Start: 2025-03-03 | End: 2025-03-04

## 2025-03-03 RX ORDER — INSULIN LISPRO 100 U/ML
4 INJECTION, SOLUTION INTRAVENOUS; SUBCUTANEOUS
Refills: 0 | DISCHARGE

## 2025-03-03 RX ORDER — AMLODIPINE BESYLATE 10 MG/1
10 TABLET ORAL DAILY
Refills: 0 | Status: DISCONTINUED | OUTPATIENT
Start: 2025-03-03 | End: 2025-03-04

## 2025-03-03 RX ORDER — CALCITRIOL 0.5 UG/1
0.5 CAPSULE, GELATIN COATED ORAL DAILY
Refills: 0 | Status: DISCONTINUED | OUTPATIENT
Start: 2025-03-03 | End: 2025-03-04

## 2025-03-03 RX ORDER — INSULIN LISPRO 100 U/ML
INJECTION, SOLUTION INTRAVENOUS; SUBCUTANEOUS
Refills: 0 | Status: DISCONTINUED | OUTPATIENT
Start: 2025-03-03 | End: 2025-03-04

## 2025-03-03 RX ORDER — CARVEDILOL 3.12 MG/1
25 TABLET, FILM COATED ORAL EVERY 12 HOURS
Refills: 0 | Status: DISCONTINUED | OUTPATIENT
Start: 2025-03-03 | End: 2025-03-04

## 2025-03-03 RX ORDER — INSULIN GLARGINE-YFGN 100 [IU]/ML
10 INJECTION, SOLUTION SUBCUTANEOUS
Refills: 0 | DISCHARGE

## 2025-03-03 RX ORDER — SEVELAMER HYDROCHLORIDE 800 MG/1
800 TABLET ORAL THREE TIMES A DAY
Refills: 0 | Status: DISCONTINUED | OUTPATIENT
Start: 2025-03-03 | End: 2025-03-04

## 2025-03-03 RX ORDER — DEXTROSE 50 % IN WATER 50 %
15 SYRINGE (ML) INTRAVENOUS ONCE
Refills: 0 | Status: DISCONTINUED | OUTPATIENT
Start: 2025-03-03 | End: 2025-03-04

## 2025-03-03 RX ORDER — SEVELAMER HYDROCHLORIDE 800 MG/1
1 TABLET ORAL
Refills: 0 | DISCHARGE

## 2025-03-03 RX ORDER — LOSARTAN POTASSIUM 100 MG/1
25 TABLET, FILM COATED ORAL DAILY
Refills: 0 | Status: DISCONTINUED | OUTPATIENT
Start: 2025-03-03 | End: 2025-03-04

## 2025-03-03 RX ORDER — LOSARTAN POTASSIUM 100 MG/1
1 TABLET, FILM COATED ORAL
Refills: 0 | DISCHARGE

## 2025-03-03 RX ORDER — DEXTROSE 50 % IN WATER 50 %
12.5 SYRINGE (ML) INTRAVENOUS ONCE
Refills: 0 | Status: DISCONTINUED | OUTPATIENT
Start: 2025-03-03 | End: 2025-03-04

## 2025-03-03 RX ORDER — INSULIN LISPRO 100 U/ML
2 INJECTION, SOLUTION INTRAVENOUS; SUBCUTANEOUS
Refills: 0 | Status: DISCONTINUED | OUTPATIENT
Start: 2025-03-03 | End: 2025-03-04

## 2025-03-03 RX ORDER — INSULIN GLARGINE-YFGN 100 [IU]/ML
8 INJECTION, SOLUTION SUBCUTANEOUS AT BEDTIME
Refills: 0 | Status: DISCONTINUED | OUTPATIENT
Start: 2025-03-03 | End: 2025-03-04

## 2025-03-03 RX ORDER — ATORVASTATIN CALCIUM 80 MG/1
80 TABLET, FILM COATED ORAL AT BEDTIME
Refills: 0 | Status: DISCONTINUED | OUTPATIENT
Start: 2025-03-03 | End: 2025-03-04

## 2025-03-03 RX ORDER — CLOPIDOGREL BISULFATE 75 MG/1
75 TABLET, FILM COATED ORAL DAILY
Refills: 0 | Status: DISCONTINUED | OUTPATIENT
Start: 2025-03-04 | End: 2025-03-04

## 2025-03-03 RX ORDER — CARVEDILOL 3.12 MG/1
1 TABLET, FILM COATED ORAL
Refills: 0 | DISCHARGE

## 2025-03-03 RX ORDER — ISOSORBIDE MONONITRATE 60 MG/1
1 TABLET, EXTENDED RELEASE ORAL
Refills: 0 | DISCHARGE

## 2025-03-03 RX ORDER — ISOSORBIDE MONONITRATE 60 MG/1
30 TABLET, EXTENDED RELEASE ORAL DAILY
Refills: 0 | Status: DISCONTINUED | OUTPATIENT
Start: 2025-03-03 | End: 2025-03-04

## 2025-03-03 RX ORDER — CALCITRIOL 0.5 UG/1
0.5 CAPSULE, GELATIN COATED ORAL
Refills: 0 | DISCHARGE

## 2025-03-03 RX ORDER — GLUCAGON 3 MG/1
1 POWDER NASAL ONCE
Refills: 0 | Status: DISCONTINUED | OUTPATIENT
Start: 2025-03-03 | End: 2025-03-04

## 2025-03-03 RX ADMIN — INSULIN LISPRO 1: 100 INJECTION, SOLUTION INTRAVENOUS; SUBCUTANEOUS at 18:42

## 2025-03-03 RX ADMIN — INSULIN LISPRO 2 UNIT(S): 100 INJECTION, SOLUTION INTRAVENOUS; SUBCUTANEOUS at 18:41

## 2025-03-03 RX ADMIN — CARVEDILOL 25 MILLIGRAM(S): 3.12 TABLET, FILM COATED ORAL at 18:41

## 2025-03-03 RX ADMIN — Medication 3 MILLILITER(S): at 21:57

## 2025-03-03 RX ADMIN — SEVELAMER HYDROCHLORIDE 800 MILLIGRAM(S): 800 TABLET ORAL at 21:54

## 2025-03-03 RX ADMIN — INSULIN GLARGINE-YFGN 8 UNIT(S): 100 INJECTION, SOLUTION SUBCUTANEOUS at 22:43

## 2025-03-03 RX ADMIN — ATORVASTATIN CALCIUM 80 MILLIGRAM(S): 80 TABLET, FILM COATED ORAL at 21:54

## 2025-03-03 NOTE — CHART NOTE - NSCHARTNOTEFT_GEN_A_CORE
R femoral artery sheath pulled under sterile technique. Manual pressure applied for 30 min. Hemostasis achieved. No hematoma or tenderness at the site.  Sterile dressing applied. The patient tolerated the sheath pull well. No complications. The patient was instructed to keep R/L LE straight for 2 hrs post sheath pulled.  Will continue monitoring.

## 2025-03-03 NOTE — H&P CARDIOLOGY - NSICDXPASTMEDICALHX_GEN_ALL_CORE_FT
PAST MEDICAL HISTORY:  CAD (coronary artery disease)     DM (diabetes mellitus)     Hypertension      PAST MEDICAL HISTORY:  Anemia     CAD (coronary artery disease)     CVA (cerebrovascular accident)     DM (diabetes mellitus)     ESRD on hemodialysis     GI bleeding     HLD (hyperlipidemia)     Hypertension     NSTEMI (non-ST elevation myocardial infarction)     Stented coronary artery

## 2025-03-03 NOTE — CONSULT NOTE ADULT - SUBJECTIVE AND OBJECTIVE BOX
[facility] NEPHROLOGY PRACTICE   MD CELINA MARTINEZ MD MARIA SANTIAGO, NP        TEL:  OFFICE: [phone]  From 5pm-7am answering service [phone]    --- INITIAL RENAL CONSULT NOTE ---date of service 03-03-25 @ 17:20    HPI:  74 y.o. female with PMH of CAD, NSTEMI, PCI in 2006, CVA (4/2021), HTN, HLD, Diabetes Mellitus 2, ESRD (on HD T-Th-Sat), Anemia, h/o GI Bleeding (requiring blood transfusion) - presents today for elective cardiac catheterization. Nephrology consulted for dialysis needs.          Allergies:  pork (Pruritus)  No Known Drug Allergies      PAST MEDICAL & SURGICAL HISTORY:  CAD (coronary artery disease)      DM (diabetes mellitus)      Hypertension      NSTEMI (non-ST elevation myocardial infarction)      Stented coronary artery      CVA (cerebrovascular accident)      HLD (hyperlipidemia)      ESRD on hemodialysis      Anemia      GI bleeding      AV fistula      S/P cataract surgery          Home Medications Reviewed    Hospital Medications:   MEDICATIONS  (STANDING):  amLODIPine   Tablet 10 milliGRAM(s) Oral daily  atorvastatin 80 milliGRAM(s) Oral at bedtime  calcitriol   Capsule 0.5 MICROGram(s) Oral daily  carvedilol 25 milliGRAM(s) Oral every 12 hours  dextrose 5%. 1000 milliLiter(s) (50 mL/Hr) IV Continuous <Continuous>  dextrose 5%. 1000 milliLiter(s) (100 mL/Hr) IV Continuous <Continuous>  dextrose 50% Injectable 25 Gram(s) IV Push once  dextrose 50% Injectable 12.5 Gram(s) IV Push once  dextrose 50% Injectable 25 Gram(s) IV Push once  glucagon  Injectable 1 milliGRAM(s) IntraMuscular once  insulin glargine Injectable (LANTUS) 8 Unit(s) SubCutaneous at bedtime  insulin lispro (ADMELOG) corrective regimen sliding scale   SubCutaneous three times a day before meals  insulin lispro Injectable (ADMELOG) 2 Unit(s) SubCutaneous three times a day before meals  isosorbide   mononitrate ER Tablet (IMDUR) 30 milliGRAM(s) Oral daily  losartan 25 milliGRAM(s) Oral daily  pantoprazole    Tablet 40 milliGRAM(s) Oral before breakfast  sevelamer carbonate 800 milliGRAM(s) Oral three times a day  sodium chloride 0.9% lock flush 3 milliLiter(s) IV Push every 8 hours      SOCIAL HISTORY:  Denies ETOh, Smoking    FAMILY HISTORY:  No pertinent family history in first degree relatives        REVIEW OF SYSTEMS:  CONSTITUTIONAL: No weakness, fevers or chills  EYES/ENT: No visual changes;  No vertigo or throat pain   NECK: No pain or stiffness  RESPIRATORY: No cough, wheezing, hemoptysis; No shortness of breath  CARDIOVASCULAR: as per hpi   GASTROINTESTINAL: No abdominal or epigastric pain. No nausea, vomiting, or hematemesis; No diarrhea or constipation. No melena or hematochezia.  GENITOURINARY: No dysuria, frequency, foamy urine, urinary urgency, incontinence or hematuria  NEUROLOGICAL: No numbness or weakness  SKIN: No itching, burning, rashes, or lesions   VASCULAR: No bilateral lower extremity edema.   All other review of systems is negative unless indicated above.    VITALS:  T(F): 97.8 (03-03-25 @ 12:00), Max: 98.1 (03-03-25 @ 09:10)  HR: 75 (03-03-25 @ 16:00)  BP: 134/66 (03-03-25 @ 16:00)  RR: 17 (03-03-25 @ 16:00)  SpO2: 99% (03-03-25 @ 16:00)  Wt(kg): --    Height (cm): 152.4 (03-03 @ 09:10)  Weight (kg): 45.4 (03-03 @ 09:10)  BMI (kg/m2): 19.5 (03-03 @ 09:10)  BSA (m2): 1.39 (03-03 @ 09:10)    PHYSICAL EXAM:  General: NAD  HEENT: anicteric sclera, oropharynx clear, MMM  Neck: No JVD  Respiratory: CTAB, no wheezes, rales or rhonchi  Cardiovascular: S1, S2, RRR  Gastrointestinal: BS+, soft, NT/ND  Extremities: No cyanosis or clubbing. No peripheral edema  Neurological: A/O x 3, no focal deficits  Psychiatric: Normal mood, normal affect  : No CVA tenderness. No foster.   Skin: No rashes  Vascular Access: LUE AVF    LABS:  03-03    137  |  99  |  74[H]  ----------------------------<  152[H]  4.5   |  21[L]  |  5.10[H]    Ca    9.4      03 Mar 2025 09:16      Creatinine Trend: 5.10 <--                        13.3   7.84  )-----------( 248      ( 03 Mar 2025 09:16 )             41.2     Urine Studies:  Urinalysis Basic - ( 03 Mar 2025 09:16 )    Color:  / Appearance:  / SG:  / pH:   Gluc: 152 mg/dL / Ketone:   / Bili:  / Urobili:    Blood:  / Protein:  / Nitrite:    Leuk Esterase:  / RBC:  / WBC    Sq Epi:  / Non Sq Epi:  / Bacteria:           RADIOLOGY & ADDITIONAL STUDIES:                
[facility] DIVISION OF KIDNEY DISEASES AND HYPERTENSION -- [phone]  -- INITIAL CONSULT NOTE  --------------------------------------------------------------------------------  HPI: 74-year-old female with PMH of HTN, HLD, DM2, CVA, CAD s/p PCI, anemia, GIB, and ESRD on HD who presented to [facility] for elective cardiac catheterization due to abnormal ECHO outpatient. Nephrology consulted for ESRD/HD management.    Patient receives HD TIW (TTS schedule) via LUE AVF at [facility] under the care of Dr. Jane. Patient has been on HD for ~2 years. Cause of ESRD is due to DM2. Last HD was on 3/1/25 and tolerated treatment well. Only tolerates up to 2L of UF due to cramping and intermittent hypotension. Patient does not recall dry weight. Still makes a lot of urine.     Patient seen and evaluated in cath lab recovery room. Currently feels well. Denies HA, fevers/chills, CP, SOB, abdominal pain, or LE swelling.     PAST HISTORY  --------------------------------------------------------------------------------  PAST MEDICAL & SURGICAL HISTORY:  CAD (coronary artery disease)  DM (diabetes mellitus)  Hypertension  NSTEMI (non-ST elevation myocardial infarction)  Stented coronary artery  CVA (cerebrovascular accident)  HLD (hyperlipidemia)  ESRD on hemodialysis  Anemia  GI bleeding  AV fistula  S/P cataract surgery    FAMILY HISTORY:  No pertinent family history in first degree relatives    PAST SOCIAL HISTORY:  No history of smoking    ALLERGIES & MEDICATIONS  --------------------------------------------------------------------------------  Allergies  pork (Pruritus)  No Known Drug Allergies    Intolerances    Standing Inpatient MedicationsamLODIPine   Tablet 10 milliGRAM(s) Oral daily  atorvastatin 80 milliGRAM(s) Oral at bedtime  calcitriol   Capsule 0.5 MICROGram(s) Oral daily  carvedilol 25 milliGRAM(s) Oral every 12 hours  dextrose 5%. 1000 milliLiter(s) IV Continuous <Continuous>  dextrose 5%. 1000 milliLiter(s) IV Continuous <Continuous>  dextrose 50% Injectable 25 Gram(s) IV Push once  dextrose 50% Injectable 12.5 Gram(s) IV Push once  dextrose 50% Injectable 25 Gram(s) IV Push once  glucagon  Injectable 1 milliGRAM(s) IntraMuscular once  insulin glargine Injectable (LANTUS) 8 Unit(s) SubCutaneous at bedtime  insulin lispro (ADMELOG) corrective regimen sliding scale   SubCutaneous three times a day before meals  insulin lispro Injectable (ADMELOG) 2 Unit(s) SubCutaneous three times a day before meals  isosorbide   mononitrate ER Tablet (IMDUR) 30 milliGRAM(s) Oral daily  losartan 25 milliGRAM(s) Oral daily  pantoprazole    Tablet 40 milliGRAM(s) Oral before breakfast  sevelamer carbonate 800 milliGRAM(s) Oral three times a day  sodium chloride 0.9% lock flush 3 milliLiter(s) IV Push every 8 hours    PRN Inpatient Medicationsdextrose Oral Gel 15 Gram(s) Oral once PRN    REVIEW OF SYSTEMS  --------------------------------------------------------------------------------  Gen: No fevers/chills  Head/Eyes/Ears: No HA  Respiratory: No dyspnea, cough  CV: No chest pain  GI: No abdominal pain, diarrhea  : No dysuria, hematuria  MSK: No edema    All other systems were reviewed and are negative, except as noted.    VITALS/PHYSICAL EXAM  --------------------------------------------------------------------------------  T(C): 36.6 (03-03-25 @ 12:00), Max: 36.7 (03-03-25 @ 09:10)  HR: 74 (03-03-25 @ 15:00) (72 - 79)  BP: 152/56 (03-03-25 @ 15:00) (139/66 - 165/61)  RR: 17 (03-03-25 @ 15:00) (16 - 18)  SpO2: 98% (03-03-25 @ 15:00) (97% - 100%)  Wt(kg): --  Height (cm): 152.4 (03-03-25 @ 09:10)  Weight (kg): 45.4 (03-03-25 @ 09:10)  BMI (kg/m2): 19.5 (03-03-25 @ 09:10)  BSA (m2): 1.39 (03-03-25 @ 09:10)    Physical Exam:  Gen: NAD  HEENT: MMM  Pulm: CTA B/L  CV: S1S2  Abd: Soft, +BS   Ext: No B/L LE edema  Neuro: Awake  Skin: Warm and dry  Vascular access: LUE AVF: skin intact, thrill felt    LABS/STUDIES  --------------------------------------------------------------------------------              13.3   7.84  >-----------<  248      [03-03-25 @ 09:16]              41.2     137  |  99  |  74  ----------------------------<  152      [03-03-25 @ 09:16]  4.5   |  21  |  5.10        Ca     9.4     [03-03-25 @ 09:16]    Creatinine Trend:  SCr 5.10 [03-03 @ 09:16]

## 2025-03-03 NOTE — CHART NOTE - NSCHARTNOTEFT_GEN_A_CORE
AJAY RODRIGUEZ 74yis s/p cardiac cath via right femoral access.  Site is stable with no hematoma, active bleed or swelling.  Dressing is clean/dry/intact.  DP pulse is palpable.  Patient denies pain, numbness, tingling, CP, SOB. VSS. Will continue to monitor.     T(C): 36.9 (03-03-25 @ 20:51), Max: 37.1 (03-03-25 @ 18:00)  HR: 79 (03-03-25 @ 20:51) (72 - 80)  BP: 143/45 (03-03-25 @ 20:51) (134/66 - 169/59)  RR: 18 (03-03-25 @ 20:51) (16 - 18)  SpO2: 100% (03-03-25 @ 20:51) (97% - 100%)

## 2025-03-03 NOTE — ASU PATIENT PROFILE, ADULT - FALL HARM RISK - UNIVERSAL INTERVENTIONS
Bed in lowest position, wheels locked, appropriate side rails in place/Call bell, personal items and telephone in reach/Instruct patient to call for assistance before getting out of bed or chair/Non-slip footwear when patient is out of bed/[place] to call system/Physically safe environment - no spills, clutter or unnecessary equipment/Purposeful Proactive Rounding/Room/bathroom lighting operational, light cord in reach

## 2025-03-03 NOTE — H&P CARDIOLOGY - COMMENTS
Pre-sedation evaluation    Dentures: upper and lower  Last PO intake: 3/2/25 PM    Level of consciousness: 1  Obstructive sleep apnea: No  Aspiration risk: No  Mallampati score: 2  ASA Classification: 2  Prior Sedative or Anesthesia Experience: No complications  Informed consent by responsible adult: Yes  Responsible adult escort: Yes  Based on today's assessment, anesthesia consult requested: No

## 2025-03-03 NOTE — H&P CARDIOLOGY - NSICDXPASTSURGICALHX_GEN_ALL_CORE_FT
PAST SURGICAL HISTORY:  No significant past surgical history      PAST SURGICAL HISTORY:  AV fistula     S/P cataract surgery

## 2025-03-03 NOTE — PATIENT PROFILE ADULT - FALL HARM RISK - HARM RISK INTERVENTIONS

## 2025-03-03 NOTE — H&P CARDIOLOGY - HISTORY OF PRESENT ILLNESS
74 y.o.  female presents today for elective Cardiac catheterization 74 y.o. female with PMH of CAD, NSTEMI, PCI in 2006, CVA (4/2021), HTN, HLD, Diabetes Mellitus 2, ESRD (on HD T-Th-Sat), Anemia, h/o GI Bleeding (requiring blood transfusion) - presents today for elective cardiac catheterization. The patient denies chest pain, SOB, palpitations, presyncope, syncope,  headache, visual disturbances, CVA, PE, DVT, CRISTO, abdominal pain, N/V/D/C, hematochezia, melena, dysuria, hematuria, fever, chills. The patient was evaluated by a cardiologist, was found ot have abnormal echo. Due to patient's symptoms and abnormal non invasive findings, the patient  was recommended to have cardiac catheterization. The patient denies any complaints at present.     referring physician, Dr. Mayen  stress test- not available  Echo  9/2023 - EF 57%, grade II diastolic dysfunction, mildly dilated LA, mild to moderate MR, small pericardial effusion.   Echo 11/2024 - EF 30%, LV wall thickness increased, hypokinetic basal inferoseptal, basal inferolateral, mid anteroseptal, mid inferior, mid anterolateral and apical anterior LV.

## 2025-03-03 NOTE — CONSULT NOTE ADULT - PROBLEM SELECTOR RECOMMENDATION 9
Patient with ESRD on HD TIW (TTS schedule) via LUE AVF now s/p elective cardiac cath on 3/3/25. Last outpatient HD was on 3/1 and tolerated treatment well. Patient clinically stable. Labs reviewed. Hgb within target range. Check serum phos. Plan for maintenance HD tomorrow. HD consent obtained and placed in the chart. Monitor BP and labs.    Discussed with attending on call.   If you have any questions, please feel free to contact me.  Kedar Gr MD  Nephrology Fellow  [email or web address] / [employer] Teams (Preferred)  (Please check the on-call schedule to reach the appropriate Nephrology Fellow)

## 2025-03-03 NOTE — CONSULT NOTE ADULT - ASSESSMENT
Patient with ESRD on HD. 
74 y.o. female with PMH of CAD, NSTEMI, PCI in 2006, CVA (4/2021), HTN, HLD, Diabetes Mellitus 2, ESRD (on HD T-Th-Sat), Anemia, h/o GI Bleeding (requiring blood transfusion) - presents today for elective cardiac catheterization. Nephrology consulted for dialysis needs.    A/P  ESRD  Center: [facility]  Follows Dr. Jane  Has LUE AVF  TTS schedule  Last hd 3/1  Consent obtained, witnessed, in chart   HD tomorrow  Renal diet  Monitor bmp     HTN   BP suboptimal  Resume home meds  UF w/ HD  Monitor bp    Anemia  Hb above goal  BARRY w/ HD when hb <11  Monitor hb     CKD-MBD  Check pth  Monitor Ca, PO4 daily

## 2025-03-04 ENCOUNTER — TRANSCRIPTION ENCOUNTER (OUTPATIENT)
Age: 75
End: 2025-03-04

## 2025-03-04 VITALS
RESPIRATION RATE: 17 BRPM | OXYGEN SATURATION: 97 % | TEMPERATURE: 98 F | SYSTOLIC BLOOD PRESSURE: 121 MMHG | DIASTOLIC BLOOD PRESSURE: 54 MMHG | HEART RATE: 79 BPM

## 2025-03-04 LAB — DIALYSIS INSTRUMENT RESULT - HEPATITIS B SURFACE ANTIGEN: NEGATIVE — SIGNIFICANT CHANGE UP

## 2025-03-04 PROCEDURE — 99232 SBSQ HOSP IP/OBS MODERATE 35: CPT

## 2025-03-04 RX ORDER — CLOPIDOGREL BISULFATE 75 MG/1
1 TABLET, FILM COATED ORAL
Qty: 90 | Refills: 0
Start: 2025-03-04 | End: 2025-06-01

## 2025-03-04 RX ORDER — CLOPIDOGREL BISULFATE 75 MG/1
1 TABLET, FILM COATED ORAL
Refills: 0 | DISCHARGE

## 2025-03-04 RX ADMIN — SEVELAMER HYDROCHLORIDE 800 MILLIGRAM(S): 800 TABLET ORAL at 05:46

## 2025-03-04 RX ADMIN — Medication 3 MILLILITER(S): at 14:22

## 2025-03-04 RX ADMIN — Medication 81 MILLIGRAM(S): at 11:38

## 2025-03-04 RX ADMIN — INSULIN LISPRO 2: 100 INJECTION, SOLUTION INTRAVENOUS; SUBCUTANEOUS at 12:08

## 2025-03-04 RX ADMIN — ISOSORBIDE MONONITRATE 30 MILLIGRAM(S): 60 TABLET, EXTENDED RELEASE ORAL at 13:55

## 2025-03-04 RX ADMIN — LOSARTAN POTASSIUM 25 MILLIGRAM(S): 100 TABLET, FILM COATED ORAL at 11:38

## 2025-03-04 RX ADMIN — CALCITRIOL 0.5 MICROGRAM(S): 0.5 CAPSULE, GELATIN COATED ORAL at 13:55

## 2025-03-04 RX ADMIN — AMLODIPINE BESYLATE 10 MILLIGRAM(S): 10 TABLET ORAL at 11:38

## 2025-03-04 RX ADMIN — INSULIN LISPRO 2 UNIT(S): 100 INJECTION, SOLUTION INTRAVENOUS; SUBCUTANEOUS at 10:30

## 2025-03-04 RX ADMIN — INSULIN LISPRO 2: 100 INJECTION, SOLUTION INTRAVENOUS; SUBCUTANEOUS at 17:45

## 2025-03-04 RX ADMIN — Medication 3 MILLILITER(S): at 05:46

## 2025-03-04 RX ADMIN — SEVELAMER HYDROCHLORIDE 800 MILLIGRAM(S): 800 TABLET ORAL at 13:55

## 2025-03-04 RX ADMIN — Medication 40 MILLIGRAM(S): at 05:47

## 2025-03-04 RX ADMIN — CARVEDILOL 25 MILLIGRAM(S): 3.12 TABLET, FILM COATED ORAL at 11:38

## 2025-03-04 RX ADMIN — INSULIN LISPRO 2 UNIT(S): 100 INJECTION, SOLUTION INTRAVENOUS; SUBCUTANEOUS at 17:45

## 2025-03-04 RX ADMIN — INSULIN LISPRO 2 UNIT(S): 100 INJECTION, SOLUTION INTRAVENOUS; SUBCUTANEOUS at 12:09

## 2025-03-04 RX ADMIN — CLOPIDOGREL BISULFATE 75 MILLIGRAM(S): 75 TABLET, FILM COATED ORAL at 11:38

## 2025-03-04 NOTE — DISCHARGE NOTE PROVIDER - CARE PROVIDER_API CALL
Rubén Mayen  Cardiovascular Disease  [address] [place] [address]  Phone: [phone]  Fax: [phone]  Follow Up Time: 1 week    Anita Whatley  Internal Medicine  [address] [place] [address]  Phone: [phone]  Fax: [phone]  Follow Up Time: 1 week

## 2025-03-04 NOTE — DISCHARGE NOTE PROVIDER - NSDCMRMEDTOKEN_GEN_ALL_CORE_FT
amLODIPine 10 mg oral tablet: 1 tab(s) orally once a day  Aspirin Low Dose 81 mg oral tablet, chewable: 1 tab(s) orally once a day  Cardiac rehab: Cardiac rehab  2 to 3 times per week for 12 weeks  s/p coronary stent  carvedilol 25 mg oral tablet: 1 tab(s) orally 2 times a day  clopidogrel 75 mg oral tablet: 1 tab(s) orally once a day  HumaLOG KwikPen 100 units/mL injectable solution: 4 unit(s) injectable 3 times a day (with meals)  isosorbide mononitrate 30 mg oral tablet, extended release: 1 tab(s) orally once a day  Lantus Solostar Pen 100 units/mL subcutaneous solution: 10 unit(s) subcutaneous once a day  Lipitor 80 mg oral tablet: 1 tab(s) orally once a day (at bedtime)  losartan 25 mg oral tablet: 1 tab(s) orally once a day  Protonix 20 mg oral delayed release tablet: 1 tab(s) orally once a day  Rocaltrol 1 mcg/mL oral liquid: 0.5 milliliter(s) orally once a day  sevelamer carbonate 800 mg oral tablet: 1 tab(s) orally 3 times a day

## 2025-03-04 NOTE — DISCHARGE NOTE PROVIDER - NSDCCPCAREPLAN_GEN_ALL_CORE_FT
PRINCIPAL DISCHARGE DIAGNOSIS  Diagnosis: CAD (coronary artery disease)  Assessment and Plan of Treatment: Please continue your statin medication to control cholesterol levels, as well as, Aspirin 81mg and PLAVIX 75mg as prescribed in order to optimize your heart health.   Follow-up with your primary provider/cardiologist as outpatient for ongoing care. If you have persistent chest pain, shortness of breath, heart palpitations, or dizziness you should return to the emergency room.      SECONDARY DISCHARGE DIAGNOSES  Diagnosis: ESRD on hemodialysis  Assessment and Plan of Treatment: Please continue to follow your dialysis schedule and refer to your primary provider/nephrologist for further care and monitoring of kidney function and electrolytes. Continue a renal restricted diet (Avoiding foods high in potassium and phosphorus), your prescribed medications, and supplementations as directed.

## 2025-03-04 NOTE — CHART NOTE - NSCHARTNOTEFT_GEN_A_CORE
S/p cath. Right femoral site without bleeding or hematoma. Dressing is clean, dry and intact. 2+ peripheral pulses in procedural lower extremity. Continue to monitor.     Js Radford NP-BC  Medicine [facility]  In-house pager #[phone]

## 2025-03-04 NOTE — DISCHARGE NOTE PROVIDER - CARE PROVIDERS DIRECT ADDRESSES
[email or web address]

## 2025-03-04 NOTE — DISCHARGE NOTE PROVIDER - PROVIDER TOKENS
PROVIDER:[TOKEN:[[id number]],FOLLOWUP:[1 week]],PROVIDER:[TOKEN:[[id number]],FOLLOWUP:[1 week]]

## 2025-03-04 NOTE — PROGRESS NOTE ADULT - ASSESSMENT
74 y.o. female with PMH of CAD, NSTEMI, PCI in 2006, CVA (4/2021), HTN, HLD, Diabetes Mellitus 2, ESRD (on HD T-Th-Sat), Anemia, h/o GI Bleeding (requiring blood transfusion) - presents today for elective cardiac catheterization. Nephrology consulted for dialysis needs.    A/P  ESRD  Center: [facility]  Follows Dr. Jane  Has LUE AVF  TTS schedule  Last hd 3/1  Consent obtained, witnessed, in chart   s/p HD today uf 1.6L  Renal diet  Monitor bmp     HTN   BP suboptimal  can uptitrate losartan to 50 mg daily  UF w/ HD  Monitor bp    Anemia  Hb above goal  BARRY w/ HD when hb <11  Monitor hb     CKD-MBD  Check pth  Monitor Ca, PO4 daily

## 2025-03-04 NOTE — DISCHARGE NOTE NURSING/CASE MANAGEMENT/SOCIAL WORK - FINANCIAL ASSISTANCE
[facility] provides services at a reduced cost to those who are determined to be eligible through [facility]’s financial assistance program. Information regarding [facility]’s financial assistance program can be found by going to [email or web address] or by calling [phone].

## 2025-03-04 NOTE — DISCHARGE NOTE PROVIDER - HOSPITAL COURSE
74 y.o. female with PMH of CAD, NSTEMI, PCI in 2006, CVA (4/2021), HTN, HLD, Diabetes Mellitus 2, ESRD (on HD T-Th-Sat), Anemia, h/o GI Bleeding (requiring blood transfusion) - presents today for elective cardiac catheterization. The patient denies chest pain, SOB, palpitations, presyncope, syncope,  headache, visual disturbances, CVA, PE, DVT, CRISTO, abdominal pain, N/V/D/C, hematochezia, melena, dysuria, hematuria, fever, chills. The patient was evaluated by a cardiologist, was found ot have abnormal echo. Due to patient's symptoms and abnormal non invasive findings, the patient  was recommended to have cardiac catheterization.    3/3 Cardiac cath - LAD 90%=> stent x1, OM severe=> medical management, RCA patent stent; RFA access    - RFA access instructions  - your procedure was performed through the  [facility]  For the next 5 days:  - Limit climbing stairs.  - Do not soak in a bathtub or pool.  - Avoid strenuous activities (pushing, pulling, straining).  - Do not lift anything more than 10 pounds  - continue lipitor, Imdur, Losartan and Coreg  - continue insulin and sliding scale as at home unless otherwise indicated   - follow up with outpatient Cardiologist Dr Mayen in 1-2 weeks  - Cardiac Rehabilitation Referral given to patient s/p PCI    - Education on benefits of cardiac rehab provided to patient: Yes  - Referral and prescription given for cardiac rehab: Yes  - Patient given a list of locations and instructed to contact their insurance company to review list of participating providers: Yes  - Patient instructed to bring cardiac rehab prescription with them to cardiology follow up appointment for assistance with enrollment: Yes  - Patient discharged with copies detailing cardiovascular history, medications, testing/treatments: Yes

## 2025-03-04 NOTE — PROGRESS NOTE ADULT - SUBJECTIVE AND OBJECTIVE BOX
[facility] NEPHROLOGY PRACTICE   MD CELINA MARTINEZ MD MARIA SANTIAGO, NP    TEL:  OFFICE: [phone]  From 5pm-7am Answering Service [phone]    -- RENAL FOLLOW UP NOTE ---Date of Service 03-04-25 @ 13:37    Patient is a 74y old  Female who presents with a chief complaint of abnormal echo       Patient seen and examined at bedside. No chest pain/sob    VITALS:  T(F): 98.1 (03-04-25 @ 09:19), Max: 98.8 (03-03-25 @ 18:00)  HR: 71 (03-04-25 @ 09:19)  BP: 180/81 (03-04-25 @ 09:19)  RR: 17 (03-04-25 @ 09:19)  SpO2: 99% (03-04-25 @ 05:50)  Wt(kg): --    03-03 @ 07:01  -  03-04 @ 07:00  --------------------------------------------------------  IN: 200 mL / OUT: 300 mL / NET: -100 mL    03-04 @ 07:01  -  03-04 @ 13:37  --------------------------------------------------------  IN: 500 mL / OUT: 2100 mL / NET: -1600 mL          PHYSICAL EXAM:  General: NAD  Neck: No JVD  Respiratory: CTAB, no wheezes, rales or rhonchi  Cardiovascular: S1, S2, RRR  Gastrointestinal: BS+, soft, NT/ND  Extremities: No peripheral edema    Hospital Medications:   MEDICATIONS  (STANDING):  amLODIPine   Tablet 10 milliGRAM(s) Oral daily  aspirin enteric coated 81 milliGRAM(s) Oral daily  atorvastatin 80 milliGRAM(s) Oral at bedtime  calcitriol   Capsule 0.5 MICROGram(s) Oral daily  carvedilol 25 milliGRAM(s) Oral every 12 hours  chlorhexidine 2% Cloths 1 Application(s) Topical <User Schedule>  clopidogrel Tablet 75 milliGRAM(s) Oral daily  dextrose 5%. 1000 milliLiter(s) (50 mL/Hr) IV Continuous <Continuous>  dextrose 5%. 1000 milliLiter(s) (100 mL/Hr) IV Continuous <Continuous>  dextrose 50% Injectable 25 Gram(s) IV Push once  dextrose 50% Injectable 12.5 Gram(s) IV Push once  dextrose 50% Injectable 25 Gram(s) IV Push once  glucagon  Injectable 1 milliGRAM(s) IntraMuscular once  insulin glargine Injectable (LANTUS) 8 Unit(s) SubCutaneous at bedtime  insulin lispro (ADMELOG) corrective regimen sliding scale   SubCutaneous three times a day before meals  insulin lispro Injectable (ADMELOG) 2 Unit(s) SubCutaneous three times a day before meals  isosorbide   mononitrate ER Tablet (IMDUR) 30 milliGRAM(s) Oral daily  losartan 25 milliGRAM(s) Oral daily  pantoprazole    Tablet 40 milliGRAM(s) Oral before breakfast  sevelamer carbonate 800 milliGRAM(s) Oral three times a day  sodium chloride 0.9% lock flush 3 milliLiter(s) IV Push every 8 hours      LABS:  03-03    137  |  99  |  74[H]  ----------------------------<  152[H]  4.5   |  21[L]  |  5.10[H]    Ca    9.4      03 Mar 2025 09:16      Creatinine Trend: 5.10 <--                                13.3   7.84  )-----------( 248      ( 03 Mar 2025 09:16 )             41.2     Urine Studies:  Urinalysis - [03-03-25 @ 09:16]      Color  / Appearance  / SG  / pH       Gluc 152 / Ketone   / Bili  / Urobili        Blood  / Protein  / Leuk Est  / Nitrite       RBC  / WBC  / Hyaline  / Gran  / Sq Epi  / Non Sq Epi  / Bacteria             RADIOLOGY & ADDITIONAL STUDIES:

## 2025-03-04 NOTE — PROGRESS NOTE ADULT - SUBJECTIVE AND OBJECTIVE BOX
Subjective/Objective:   3/3 Cardiac cath - LAD 90%=> stent x1, OM severe=> medical management, RCA patent stent; RFA access    Overnight event:  feeling well, without complaints, had HD session early this morning. admits to ambulating without difficulty     MEDICATIONS    amLODIPine   Tablet 10 milliGRAM(s) Oral daily  aspirin enteric coated 81 milliGRAM(s) Oral daily  carvedilol 25 milliGRAM(s) Oral every 12 hours  clopidogrel Tablet 75 milliGRAM(s) Oral daily  isosorbide   mononitrate ER Tablet (IMDUR) 30 milliGRAM(s) Oral daily  losartan 25 milliGRAM(s) Oral daily    pantoprazole    Tablet 40 milliGRAM(s) Oral before breakfast    atorvastatin 80 milliGRAM(s) Oral at bedtime  dextrose 50% Injectable 25 Gram(s) IV Push once  dextrose 50% Injectable 12.5 Gram(s) IV Push once  dextrose 50% Injectable 25 Gram(s) IV Push once  dextrose Oral Gel 15 Gram(s) Oral once PRN  glucagon  Injectable 1 milliGRAM(s) IntraMuscular once  insulin glargine Injectable (LANTUS) 8 Unit(s) SubCutaneous at bedtime  insulin lispro (ADMELOG) corrective regimen sliding scale   SubCutaneous three times a day before meals  insulin lispro Injectable (ADMELOG) 2 Unit(s) SubCutaneous three times a day before meals    calcitriol   Capsule 0.5 MICROGram(s) Oral daily  chlorhexidine 2% Cloths 1 Application(s) Topical <User Schedule>  dextrose 5%. 1000 milliLiter(s) IV Continuous <Continuous>  dextrose 5%. 1000 milliLiter(s) IV Continuous <Continuous>  sodium chloride 0.9% lock flush 3 milliLiter(s) IV Push every 8 hours            REVIEW OF SYSTEMS:  Complete 10point ROS negative.    ICU Vital Signs Last 24 Hrs  T(C): 36.7 (04 Mar 2025 09:19), Max: 37.1 (03 Mar 2025 18:00)  T(F): 98.1 (04 Mar 2025 09:19), Max: 98.8 (03 Mar 2025 18:00)  HR: 71 (04 Mar 2025 09:19) (71 - 80)  BP: 180/81 (04 Mar 2025 09:19) (134/66 - 180/81)  BP(mean): --  ABP: --  ABP(mean): --  RR: 17 (04 Mar 2025 09:19) (17 - 18)  SpO2: 99% (04 Mar 2025 05:50) (97% - 100%)    O2 Parameters below as of 04 Mar 2025 09:19  Patient On (Oxygen Delivery Method): room air            PHYSICAL EXAMINATION  Appearance: NAD, no distress  HEENT: Moist Mucous Membranes, Anicteric, PERRL, EOMI  Cardiovascular: Regular rate and rhythm, Normal S1 S2, No JVD, No murmurs  Respiratory: Lungs clear to auscultation. No rales, No rhonchi, No wheezing. No tenderness to palpation  Gastrointestinal:  Soft, Non-tender, + BS  Neurologic: Non-focal, A&Ox3  Skin: Warm and dry, No rashes, No ecchymosis, No cyanosis  Musculoskeletal: No clubbing, No cyanosis, No edema  Vascular: Peripheral pulses palpable 2+ bilaterally  Right groin soft, nontender, no hematoma, no bleed, distal pulse intact  Psychiatry: Mood & affect appropriate      	    		      I&O's Summary    03 Mar 2025 07:01  -  04 Mar 2025 07:00  --------------------------------------------------------  IN: 200 mL / OUT: 300 mL / NET: -100 mL    04 Mar 2025 07:01  -  04 Mar 2025 10:17  --------------------------------------------------------  IN: 500 mL / OUT: 2100 mL / NET: -1600 mL    	     LABS:	  LABORATORY VALUES	 	                          13.3   7.84  )-----------( 248      ( 03 Mar 2025 09:16 )             41.2       03-03    137  |  99  |  74[H]  ----------------------------<  152[H]  4.5   |  21[L]  |  5.10[H]    Ca    9.4      03 Mar 2025 09:16        Urinalysis Basic - ( 03 Mar 2025 09:16 )    Color: x / Appearance: x / SG: x / pH: x  Gluc: 152 mg/dL / Ketone: x  / Bili: x / Urobili: x   Blood: x / Protein: x / Nitrite: x   Leuk Esterase: x / RBC: x / WBC x   Sq Epi: x / Non Sq Epi: x / Bacteria: x      CAPILLARY BLOOD GLUCOSE      POCT Blood Glucose.: 106 mg/dL (04 Mar 2025 09:12)      Assessment and Plan:  74 y.o. female with PMH of CAD, NSTEMI, PCI in 2006, CVA (4/2021), HTN, HLD, Diabetes Mellitus 2, ESRD (on HD T-Th-Sat), Anemia, h/o GI Bleeding (requiring blood transfusion) who presented for elective cardiac catheterization with drug eluting stent LAD.     3/3 Cardiac cath - LAD 90%=> stent x1, OM severe=> medical management, RCA patent stent via RFA access  Nephrology consulted for history of ESRD on HD, underwent HD session today   missed AM labs as HD was done early this AM           Subjective/Objective:   3/3 Cardiac cath - LAD 90%=> stent x1, OM severe=> medical management, RCA patent stent; RFA access    Overnight event:  feeling well, without complaints, had HD session early this morning. admits to ambulating without difficulty     MEDICATIONS    amLODIPine   Tablet 10 milliGRAM(s) Oral daily  aspirin enteric coated 81 milliGRAM(s) Oral daily  carvedilol 25 milliGRAM(s) Oral every 12 hours  clopidogrel Tablet 75 milliGRAM(s) Oral daily  isosorbide   mononitrate ER Tablet (IMDUR) 30 milliGRAM(s) Oral daily  losartan 25 milliGRAM(s) Oral daily    pantoprazole    Tablet 40 milliGRAM(s) Oral before breakfast    atorvastatin 80 milliGRAM(s) Oral at bedtime  dextrose 50% Injectable 25 Gram(s) IV Push once  dextrose 50% Injectable 12.5 Gram(s) IV Push once  dextrose 50% Injectable 25 Gram(s) IV Push once  dextrose Oral Gel 15 Gram(s) Oral once PRN  glucagon  Injectable 1 milliGRAM(s) IntraMuscular once  insulin glargine Injectable (LANTUS) 8 Unit(s) SubCutaneous at bedtime  insulin lispro (ADMELOG) corrective regimen sliding scale   SubCutaneous three times a day before meals  insulin lispro Injectable (ADMELOG) 2 Unit(s) SubCutaneous three times a day before meals    calcitriol   Capsule 0.5 MICROGram(s) Oral daily  chlorhexidine 2% Cloths 1 Application(s) Topical <User Schedule>  dextrose 5%. 1000 milliLiter(s) IV Continuous <Continuous>  dextrose 5%. 1000 milliLiter(s) IV Continuous <Continuous>  sodium chloride 0.9% lock flush 3 milliLiter(s) IV Push every 8 hours            REVIEW OF SYSTEMS:  Complete 10point ROS negative.    ICU Vital Signs Last 24 Hrs  T(C): 36.7 (04 Mar 2025 09:19), Max: 37.1 (03 Mar 2025 18:00)  T(F): 98.1 (04 Mar 2025 09:19), Max: 98.8 (03 Mar 2025 18:00)  HR: 71 (04 Mar 2025 09:19) (71 - 80)  BP: 180/81 (04 Mar 2025 09:19) (134/66 - 180/81)  BP(mean): --  ABP: --  ABP(mean): --  RR: 17 (04 Mar 2025 09:19) (17 - 18)  SpO2: 99% (04 Mar 2025 05:50) (97% - 100%)    O2 Parameters below as of 04 Mar 2025 09:19  Patient On (Oxygen Delivery Method): room air            PHYSICAL EXAMINATION  Appearance: NAD, no distress  HEENT: Moist Mucous Membranes, Anicteric, PERRL, EOMI  Cardiovascular: Regular rate and rhythm, Normal S1 S2, No JVD, No murmurs  Respiratory: Lungs clear to auscultation. No rales, No rhonchi, No wheezing. No tenderness to palpation  Gastrointestinal:  Soft, Non-tender, + BS  Neurologic: Non-focal, A&Ox3  Skin: Warm and dry, No rashes, No ecchymosis, No cyanosis  Musculoskeletal: No clubbing, No cyanosis, No edema  Vascular: Peripheral pulses palpable 2+ bilaterally  Right groin soft, nontender, no hematoma, no bleed, distal pulse intact  Psychiatry: Mood & affect appropriate      	    		      I&O's Summary    03 Mar 2025 07:01  -  04 Mar 2025 07:00  --------------------------------------------------------  IN: 200 mL / OUT: 300 mL / NET: -100 mL    04 Mar 2025 07:01  -  04 Mar 2025 10:17  --------------------------------------------------------  IN: 500 mL / OUT: 2100 mL / NET: -1600 mL    	     LABS:	  LABORATORY VALUES	 	                          13.3   7.84  )-----------( 248      ( 03 Mar 2025 09:16 )             41.2       03-03    137  |  99  |  74[H]  ----------------------------<  152[H]  4.5   |  21[L]  |  5.10[H]    Ca    9.4      03 Mar 2025 09:16        Urinalysis Basic - ( 03 Mar 2025 09:16 )    Color: x / Appearance: x / SG: x / pH: x  Gluc: 152 mg/dL / Ketone: x  / Bili: x / Urobili: x   Blood: x / Protein: x / Nitrite: x   Leuk Esterase: x / RBC: x / WBC x   Sq Epi: x / Non Sq Epi: x / Bacteria: x      CAPILLARY BLOOD GLUCOSE      POCT Blood Glucose.: 106 mg/dL (04 Mar 2025 09:12)      Assessment and Plan:  74 y.o. female with PMH of CAD, NSTEMI, PCI in 2006, CVA (4/2021), HTN, HLD, Diabetes Mellitus 2, ESRD (on HD T-Th-Sat), Anemia, h/o GI Bleeding (requiring blood transfusion) who presented for elective cardiac catheterization with drug eluting stent LAD.     3/3 Cardiac cath - LAD 90%=> stent x1, OM severe=> medical management, RCA patent stent via RFA access  Nephrology consulted for history of ESRD on HD, underwent HD session today and appears comfortable and euvolemic; to continue outpatient HD sessions as schedule  missed AM labs as HD was done early this AM  - patient education to continue ASA 81mg po daily indefinitely and Plavix 75mg po daily for a minimum of 1 year,  Do not stop taking them without consulting with your cardiologist  - primary medicine ACP team, upon discharge will need to e-prescribe prescription for Plavix 75mg po daily for a minimum of 1 year, please e-prescribed at least 3 months supply   - RFA access instructions  - your procedure was performed through the  [facility]  For the next 5 days:  - Limit climbing stairs.  - Do not soak in a bathtub or pool.  - Avoid strenuous activities (pushing, pulling, straining).  - Do not lift anything more than 10 pounds  - continue Lipitor, Metoprolol and Diltiazem  - follow up with outpatient Cardiologist Dr Mayen in 1-2 weeks  - Cardiac Rehabilitation Referral given to patient s/p PCI    - Education on benefits of cardiac rehab provided to patient: Yes  - Referral and prescription given for cardiac rehab: Yes  - Patient given a list of locations and instructed to contact their insurance company to review list of participating providers: Yes  - Patient instructed to bring cardiac rehab prescription with them to cardiology follow up appointment for assistance with enrollment: Yes  - Patient discharged with copies detailing cardiovascular history, medications, testing/treatments: Yes    may be discharged to home from Interventional Cardiology standpoint if RFA access site remains stable             Subjective/Objective:   3/3 Cardiac cath - LAD 90%=> stent x1, OM severe=> medical management, RCA patent stent; RFA access    Overnight event:  feeling well, without complaints, had HD session early this morning and is now sitting up in bed eating her breakfast. Admits to ambulating ro bathroom without difficulty. I encouraged her to ambulates more as tolerated with help if needed    MEDICATIONS    amLODIPine   Tablet 10 milliGRAM(s) Oral daily  aspirin enteric coated 81 milliGRAM(s) Oral daily  carvedilol 25 milliGRAM(s) Oral every 12 hours  clopidogrel Tablet 75 milliGRAM(s) Oral daily  isosorbide   mononitrate ER Tablet (IMDUR) 30 milliGRAM(s) Oral daily  losartan 25 milliGRAM(s) Oral daily    pantoprazole    Tablet 40 milliGRAM(s) Oral before breakfast    atorvastatin 80 milliGRAM(s) Oral at bedtime  dextrose 50% Injectable 25 Gram(s) IV Push once  dextrose 50% Injectable 12.5 Gram(s) IV Push once  dextrose 50% Injectable 25 Gram(s) IV Push once  dextrose Oral Gel 15 Gram(s) Oral once PRN  glucagon  Injectable 1 milliGRAM(s) IntraMuscular once  insulin glargine Injectable (LANTUS) 8 Unit(s) SubCutaneous at bedtime  insulin lispro (ADMELOG) corrective regimen sliding scale   SubCutaneous three times a day before meals  insulin lispro Injectable (ADMELOG) 2 Unit(s) SubCutaneous three times a day before meals    calcitriol   Capsule 0.5 MICROGram(s) Oral daily  chlorhexidine 2% Cloths 1 Application(s) Topical <User Schedule>  dextrose 5%. 1000 milliLiter(s) IV Continuous <Continuous>  dextrose 5%. 1000 milliLiter(s) IV Continuous <Continuous>  sodium chloride 0.9% lock flush 3 milliLiter(s) IV Push every 8 hours            REVIEW OF SYSTEMS:  Complete 10point ROS negative.    ICU Vital Signs Last 24 Hrs  T(C): 36.7 (04 Mar 2025 09:19), Max: 37.1 (03 Mar 2025 18:00)  T(F): 98.1 (04 Mar 2025 09:19), Max: 98.8 (03 Mar 2025 18:00)  HR: 71 (04 Mar 2025 09:19) (71 - 80)  BP: 180/81 (04 Mar 2025 09:19) (134/66 - 180/81)  BP(mean): --  ABP: --  ABP(mean): --  RR: 17 (04 Mar 2025 09:19) (17 - 18)  SpO2: 99% (04 Mar 2025 05:50) (97% - 100%)    O2 Parameters below as of 04 Mar 2025 09:19  Patient On (Oxygen Delivery Method): room air            PHYSICAL EXAMINATION  Appearance: NAD, no distress  HEENT: Moist Mucous Membranes, Anicteric, PERRL, EOMI  Cardiovascular: Regular rate and rhythm, Normal S1 S2, No JVD, No murmurs  Respiratory: Lungs clear to auscultation. No rales, No rhonchi, No wheezing. No tenderness to palpation  Gastrointestinal:  Soft, Non-tender, + BS  Neurologic: Non-focal, A&Ox3  Skin: Warm and dry, No rashes, No ecchymosis, No cyanosis  Musculoskeletal: No clubbing, No cyanosis, No edema  Vascular: Peripheral pulses palpable 2+ bilaterally  Right groin soft, slightly tender, no hematoma, no bleed, distal pulse intact  Psychiatry: Mood & affect appropriate      	    		      I&O's Summary    03 Mar 2025 07:01  -  04 Mar 2025 07:00  --------------------------------------------------------  IN: 200 mL / OUT: 300 mL / NET: -100 mL    04 Mar 2025 07:01  -  04 Mar 2025 10:17  --------------------------------------------------------  IN: 500 mL / OUT: 2100 mL / NET: -1600 mL    	     LABS:	  LABORATORY VALUES	 	                          13.3   7.84  )-----------( 248      ( 03 Mar 2025 09:16 )             41.2       03-03    137  |  99  |  74[H]  ----------------------------<  152[H]  4.5   |  21[L]  |  5.10[H]    Ca    9.4      03 Mar 2025 09:16        Urinalysis Basic - ( 03 Mar 2025 09:16 )    Color: x / Appearance: x / SG: x / pH: x  Gluc: 152 mg/dL / Ketone: x  / Bili: x / Urobili: x   Blood: x / Protein: x / Nitrite: x   Leuk Esterase: x / RBC: x / WBC x   Sq Epi: x / Non Sq Epi: x / Bacteria: x      CAPILLARY BLOOD GLUCOSE      POCT Blood Glucose.: 106 mg/dL (04 Mar 2025 09:12)      Assessment and Plan:  74 y.o. female with PMH of CAD, NSTEMI, PCI in 2006, CVA (4/2021), HTN, HLD, Diabetes Mellitus 2, ESRD (on HD T-Th-Sat), Anemia, h/o GI Bleeding (requiring blood transfusion) who presented for elective cardiac catheterization with drug eluting stent LAD.     3/3 Cardiac cath - LAD 90%=> stent x1, OM severe=> medical management, RCA patent stent via RFA access  Nephrology consulted for history of ESRD on HD, underwent HD session today and appears comfortable and euvolemic; to continue outpatient HD sessions as schedule  missed AM labs as HD was done early this AM  - patient education to continue ASA 81mg po daily indefinitely and Plavix 75mg po daily for a minimum of 1 year,  Do not stop taking them without consulting with your cardiologist  - primary medicine ACP team, upon discharge will need to e-prescribe prescription for Plavix 75mg po daily for a minimum of 1 year, please e-prescribed at least 3 months supply   - RFA access instructions  - your procedure was performed through the  [facility]  For the next 5 days:  - Limit climbing stairs.  - Do not soak in a bathtub or pool.  - Avoid strenuous activities (pushing, pulling, straining).  - Do not lift anything more than 10 pounds  - continue lipitor, Imdur, Losartan and Coreg  - continue insulin and sliding scale as at home unless otherwise indicated   - follow up with outpatient Cardiologist Dr Mayen in 1-2 weeks  - Cardiac Rehabilitation Referral given to patient s/p PCI    - Education on benefits of cardiac rehab provided to patient: Yes  - Referral and prescription given for cardiac rehab: Yes  - Patient given a list of locations and instructed to contact their insurance company to review list of participating providers: Yes  - Patient instructed to bring cardiac rehab prescription with them to cardiology follow up appointment for assistance with enrollment: Yes  - Patient discharged with copies detailing cardiovascular history, medications, testing/treatments: Yes    as discussed with Dr THIERNO Zhou may be discharged to home from Interventional Cardiology standpoint if RFA access site remains stable

## 2025-03-04 NOTE — DISCHARGE NOTE NURSING/CASE MANAGEMENT/SOCIAL WORK - PATIENT PORTAL LINK FT
You can access the FollowMyHealth Patient Portal offered by [facility] by registering at the following website: [email or web address]. By joining [email or web address]’s FollowMyHealth portal, you will also be able to view your health information using other applications (apps) compatible with our system.

## 2025-03-05 LAB
HBV CORE AB SER-ACNC: SIGNIFICANT CHANGE UP
HBV SURFACE AB SER-ACNC: 5.9 MIU/ML — LOW
HBV SURFACE AG SER-ACNC: SIGNIFICANT CHANGE UP
HCV AB S/CO SERPL IA: 0.21 S/CO — SIGNIFICANT CHANGE UP (ref 0–0.79)
HCV AB SERPL-IMP: SIGNIFICANT CHANGE UP
MRSA PCR RESULT.: SIGNIFICANT CHANGE UP
S AUREUS DNA NOSE QL NAA+PROBE: DETECTED